# Patient Record
Sex: FEMALE | Race: WHITE | Employment: FULL TIME | URBAN - METROPOLITAN AREA
[De-identification: names, ages, dates, MRNs, and addresses within clinical notes are randomized per-mention and may not be internally consistent; named-entity substitution may affect disease eponyms.]

---

## 2018-03-29 PROBLEM — K21.9 GERD (GASTROESOPHAGEAL REFLUX DISEASE): Status: ACTIVE | Noted: 2018-03-29

## 2018-03-29 PROBLEM — E78.5 HYPERLIPIDEMIA: Status: ACTIVE | Noted: 2018-03-29

## 2018-03-29 PROBLEM — I10 ESSENTIAL HYPERTENSION: Status: ACTIVE | Noted: 2018-03-29

## 2018-03-29 PROBLEM — M25.552 LEFT HIP PAIN: Status: ACTIVE | Noted: 2018-03-29

## 2018-06-20 ENCOUNTER — HOSPITAL ENCOUNTER (OUTPATIENT)
Dept: MAMMOGRAPHY | Age: 58
Discharge: HOME | End: 2018-06-20
Attending: OBSTETRICS & GYNECOLOGY
Payer: COMMERCIAL

## 2018-06-20 DIAGNOSIS — M94.9 DISORDER OF BONE AND CARTILAGE: ICD-10-CM

## 2018-06-20 DIAGNOSIS — M89.9 DISORDER OF BONE AND CARTILAGE: ICD-10-CM

## 2018-06-20 DIAGNOSIS — Z12.31 ENCOUNTER FOR SCREENING MAMMOGRAM FOR BREAST CANCER: ICD-10-CM

## 2018-06-20 PROCEDURE — 77080 DXA BONE DENSITY AXIAL: CPT

## 2018-06-20 PROCEDURE — 77067 SCR MAMMO BI INCL CAD: CPT

## 2019-08-28 ENCOUNTER — HOSPITAL ENCOUNTER (OUTPATIENT)
Dept: MAMMOGRAPHY | Age: 59
Discharge: HOME | End: 2019-08-28
Attending: OBSTETRICS & GYNECOLOGY
Payer: COMMERCIAL

## 2019-08-28 DIAGNOSIS — N64.9 BREAST LESION: ICD-10-CM

## 2019-08-28 PROCEDURE — 76642 ULTRASOUND BREAST LIMITED: CPT

## 2019-08-28 PROCEDURE — 77066 DX MAMMO INCL CAD BI: CPT

## 2020-01-28 ENCOUNTER — HOSPITAL ENCOUNTER (OUTPATIENT)
Dept: LAB | Age: 60
Discharge: HOME | End: 2020-01-28

## 2020-01-28 PROCEDURE — 88312 SPECIAL STAINS GROUP 1: CPT

## 2020-01-28 PROCEDURE — 88305 TISSUE EXAM BY PATHOLOGIST: CPT

## 2020-10-06 ENCOUNTER — HOSPITAL ENCOUNTER (OUTPATIENT)
Dept: LAB | Age: 60
Discharge: HOME | End: 2020-10-06
Payer: COMMERCIAL

## 2020-10-06 ENCOUNTER — PATIENT OUTREACH (OUTPATIENT)
Dept: CASE MANAGEMENT | Age: 60
End: 2020-10-06

## 2020-10-06 DIAGNOSIS — I82.401 DEEP VEIN THROMBOSIS (DVT) OF RIGHT LOWER EXTREMITY, UNSPECIFIED CHRONICITY, UNSPECIFIED VEIN (HCC): ICD-10-CM

## 2020-10-06 DIAGNOSIS — C90.00 MULTIPLE MYELOMA NOT HAVING ACHIEVED REMISSION (HCC): ICD-10-CM

## 2020-10-06 LAB
25(OH)D3 SERPL-MCNC: 25 NG/ML (ref 30–100)
ALBUMIN SERPL-MCNC: 3.5 G/DL (ref 3.2–4.6)
ALBUMIN/GLOB SERPL: 1.1 {RATIO} (ref 1.2–3.5)
ALP SERPL-CCNC: 63 U/L (ref 50–136)
ALT SERPL-CCNC: 48 U/L (ref 12–65)
ANION GAP SERPL CALC-SCNC: 4 MMOL/L (ref 7–16)
AST SERPL-CCNC: 21 U/L (ref 15–37)
BASOPHILS # BLD: 0 K/UL (ref 0–0.2)
BASOPHILS NFR BLD: 0 % (ref 0–2)
BILIRUB SERPL-MCNC: 0.2 MG/DL (ref 0.2–1.1)
BUN SERPL-MCNC: 15 MG/DL (ref 8–23)
CALCIUM SERPL-MCNC: 8.8 MG/DL (ref 8.3–10.4)
CHLORIDE SERPL-SCNC: 108 MMOL/L (ref 98–107)
CO2 SERPL-SCNC: 29 MMOL/L (ref 21–32)
CREAT SERPL-MCNC: 0.9 MG/DL (ref 0.6–1)
DIFFERENTIAL METHOD BLD: ABNORMAL
EOSINOPHIL # BLD: 0.1 K/UL (ref 0–0.8)
EOSINOPHIL NFR BLD: 1 % (ref 0.5–7.8)
ERYTHROCYTE [DISTWIDTH] IN BLOOD BY AUTOMATED COUNT: 15.5 % (ref 11.9–14.6)
GLOBULIN SER CALC-MCNC: 3.2 G/DL (ref 2.3–3.5)
GLUCOSE SERPL-MCNC: 98 MG/DL (ref 65–100)
HCT VFR BLD AUTO: 39.4 % (ref 35.8–46.3)
HGB BLD-MCNC: 12.5 G/DL (ref 11.7–15.4)
IMM GRANULOCYTES # BLD AUTO: 0.2 K/UL (ref 0–0.5)
IMM GRANULOCYTES NFR BLD AUTO: 3 % (ref 0–5)
LYMPHOCYTES # BLD: 1.8 K/UL (ref 0.5–4.6)
LYMPHOCYTES NFR BLD: 22 % (ref 13–44)
MCH RBC QN AUTO: 30 PG (ref 26.1–32.9)
MCHC RBC AUTO-ENTMCNC: 31.7 G/DL (ref 31.4–35)
MCV RBC AUTO: 94.7 FL (ref 79.6–97.8)
MONOCYTES # BLD: 0.8 K/UL (ref 0.1–1.3)
MONOCYTES NFR BLD: 10 % (ref 4–12)
NEUTS SEG # BLD: 5.2 K/UL (ref 1.7–8.2)
NEUTS SEG NFR BLD: 64 % (ref 43–78)
NRBC # BLD: 0 K/UL (ref 0–0.2)
PLATELET # BLD AUTO: 217 K/UL (ref 150–450)
PMV BLD AUTO: 11.4 FL (ref 9.4–12.3)
POTASSIUM SERPL-SCNC: 3.7 MMOL/L (ref 3.5–5.1)
PROT SERPL-MCNC: 6.7 G/DL (ref 6.3–8.2)
RBC # BLD AUTO: 4.16 M/UL (ref 4.05–5.25)
SODIUM SERPL-SCNC: 141 MMOL/L (ref 136–145)
VIT B12 SERPL-MCNC: 453 PG/ML (ref 193–986)
WBC # BLD AUTO: 8.2 K/UL (ref 4.3–11.1)

## 2020-10-06 PROCEDURE — 86334 IMMUNOFIX E-PHORESIS SERUM: CPT

## 2020-10-06 PROCEDURE — 86147 CARDIOLIPIN ANTIBODY EA IG: CPT

## 2020-10-06 PROCEDURE — 85025 COMPLETE CBC W/AUTO DIFF WBC: CPT

## 2020-10-06 PROCEDURE — 82306 VITAMIN D 25 HYDROXY: CPT

## 2020-10-06 PROCEDURE — 82607 VITAMIN B-12: CPT

## 2020-10-06 PROCEDURE — 80053 COMPREHEN METABOLIC PANEL: CPT

## 2020-10-06 PROCEDURE — 36415 COLL VENOUS BLD VENIPUNCTURE: CPT

## 2020-10-06 PROCEDURE — 81241 F5 GENE: CPT

## 2020-10-06 PROCEDURE — 82232 ASSAY OF BETA-2 PROTEIN: CPT

## 2020-10-06 PROCEDURE — 85300 ANTITHROMBIN III ACTIVITY: CPT

## 2020-10-06 PROCEDURE — 81240 F2 GENE: CPT

## 2020-10-06 PROCEDURE — 81291 MTHFR GENE: CPT

## 2020-10-06 PROCEDURE — 84165 PROTEIN E-PHORESIS SERUM: CPT

## 2020-10-06 PROCEDURE — 83090 ASSAY OF HOMOCYSTEINE: CPT

## 2020-10-06 PROCEDURE — 85306 CLOT INHIBIT PROT S FREE: CPT

## 2020-10-06 PROCEDURE — 85303 CLOT INHIBIT PROT C ACTIVITY: CPT

## 2020-10-06 PROCEDURE — 83883 ASSAY NEPHELOMETRY NOT SPEC: CPT

## 2020-10-06 NOTE — PROGRESS NOTES
10/6/2020- Patient seen in the office today. Transplant process reviewed with patient. Patient will return to the office in November with  to discuss further. Scheduling pre-studies and planning for December transplant r/t insurance deductible has been met. Patient down to lab for blood work, we will review in November. Navigation provided educational booklet on transplants and contact information. Patient instructed to contact navigator with any questions or concerns. From: Tommy Michel [[email or web address]] <[email or web address]>  Sent: Tuesday, April 7, 2020 11:58 AM  To: Tanna Freedman [email or web address]>  Subject: [EXTERNAL] Re: Dr. Severo Perry     Ok got it. Thanks Tanna!!!!    Sent from my iPad

## 2020-10-07 LAB
AT III PPP CHRO-ACNC: 169 % (ref 75–135)
HCYS SERPL-SCNC: 10.3 UMOL/L (ref 0–17.2)
PROT S ACT/NOR PPP: 127 % (ref 63–140)

## 2020-10-08 LAB
B2 MICROGLOB SERPL-MCNC: 1.7 MG/L (ref 0.8–2.34)
CARDIOLIPIN IGA SER IA-ACNC: <9 APL U/ML (ref 0–11)
CARDIOLIPIN IGG SER IA-ACNC: <9 GPL U/ML (ref 0–14)
CARDIOLIPIN IGM SER IA-ACNC: <9 MPL U/ML (ref 0–12)
KAPPA LC FREE SER-MCNC: 7.05 MG/L (ref 3.3–19.4)
KAPPA LC FREE/LAMBDA FREE SER: 0.22 {RATIO} (ref 0.26–1.65)
LAMBDA LC FREE SERPL-MCNC: 31.52 MG/L (ref 5.71–26.3)
PROT C ACT/NOR PPP CHRO: 160 %

## 2020-10-12 LAB
ALBUMIN SERPL ELPH-MCNC: 3.76 G/DL (ref 3.2–5.6)
ALBUMIN/GLOB SERPL: 1.5 {RATIO} (ref 1.2–3.5)
ALPHA1 GLOB SERPL ELPH-MCNC: 0.26 G/DL (ref 0.1–0.4)
ALPHA2 GLOB SERPL ELPH-MCNC: 0.73 G/DL (ref 0.4–1.2)
B-GLOBULIN SERPL QL ELPH: 1.12 G/DL (ref 0.6–1.3)
F2 GENE MUT ANL BLD/T: NORMAL
F5 GENE MUT ANL BLD/T: NORMAL
GAMMA GLOB MFR SERPL ELPH: 0.44 G/DL (ref 0.5–1.6)
IGA SERPL-MCNC: 48 MG/DL (ref 85–499)
IGG SERPL-MCNC: 495 MG/DL (ref 610–1616)
IGM SERPL-MCNC: 23 MG/DL (ref 35–242)
M PROTEIN SERPL ELPH-MCNC: ABNORMAL G/DL
PROT PATTERN SERPL ELPH-IMP: ABNORMAL
PROT PATTERN SPEC IFE-IMP: ABNORMAL
PROT SERPL-MCNC: 6.3 G/DL (ref 6.3–8.2)

## 2020-10-14 LAB — MTHFR GENE MUT ANL BLD/T: NORMAL

## 2020-11-03 ENCOUNTER — PATIENT OUTREACH (OUTPATIENT)
Dept: CASE MANAGEMENT | Age: 60
End: 2020-11-03

## 2020-11-03 ENCOUNTER — HOSPITAL ENCOUNTER (OUTPATIENT)
Dept: LAB | Age: 60
Discharge: HOME | End: 2020-11-03
Payer: COMMERCIAL

## 2020-11-03 DIAGNOSIS — C90.00 MULTIPLE MYELOMA NOT HAVING ACHIEVED REMISSION (HCC): ICD-10-CM

## 2020-11-03 LAB
ALBUMIN SERPL-MCNC: 3.4 G/DL (ref 3.2–4.6)
ALBUMIN/GLOB SERPL: 1.1 {RATIO} (ref 1.2–3.5)
ALP SERPL-CCNC: 65 U/L (ref 50–136)
ALT SERPL-CCNC: 48 U/L (ref 12–65)
ANION GAP SERPL CALC-SCNC: 3 MMOL/L (ref 7–16)
AST SERPL-CCNC: 14 U/L (ref 15–37)
BASOPHILS # BLD: 0 K/UL (ref 0–0.2)
BASOPHILS NFR BLD: 1 % (ref 0–2)
BILIRUB SERPL-MCNC: 0.4 MG/DL (ref 0.2–1.1)
BUN SERPL-MCNC: 13 MG/DL (ref 8–23)
CALCIUM SERPL-MCNC: 8.6 MG/DL (ref 8.3–10.4)
CHLORIDE SERPL-SCNC: 109 MMOL/L (ref 98–107)
CO2 SERPL-SCNC: 29 MMOL/L (ref 21–32)
CREAT SERPL-MCNC: 0.8 MG/DL (ref 0.6–1)
DIFFERENTIAL METHOD BLD: ABNORMAL
EOSINOPHIL # BLD: 0.1 K/UL (ref 0–0.8)
EOSINOPHIL NFR BLD: 2 % (ref 0.5–7.8)
ERYTHROCYTE [DISTWIDTH] IN BLOOD BY AUTOMATED COUNT: 15.8 % (ref 11.9–14.6)
GLOBULIN SER CALC-MCNC: 3.1 G/DL (ref 2.3–3.5)
GLUCOSE SERPL-MCNC: 86 MG/DL (ref 65–100)
HCT VFR BLD AUTO: 40.1 % (ref 35.8–46.3)
HGB BLD-MCNC: 12.7 G/DL (ref 11.7–15.4)
IMM GRANULOCYTES # BLD AUTO: 0 K/UL (ref 0–0.5)
IMM GRANULOCYTES NFR BLD AUTO: 1 % (ref 0–5)
LYMPHOCYTES # BLD: 1.4 K/UL (ref 0.5–4.6)
LYMPHOCYTES NFR BLD: 27 % (ref 13–44)
MAGNESIUM SERPL-MCNC: 2.2 MG/DL (ref 1.8–2.4)
MCH RBC QN AUTO: 30 PG (ref 26.1–32.9)
MCHC RBC AUTO-ENTMCNC: 31.7 G/DL (ref 31.4–35)
MCV RBC AUTO: 94.6 FL (ref 79.6–97.8)
MONOCYTES # BLD: 0.7 K/UL (ref 0.1–1.3)
MONOCYTES NFR BLD: 14 % (ref 4–12)
NEUTS SEG # BLD: 3 K/UL (ref 1.7–8.2)
NEUTS SEG NFR BLD: 56 % (ref 43–78)
NRBC # BLD: 0 K/UL (ref 0–0.2)
PLATELET # BLD AUTO: 247 K/UL (ref 150–450)
PMV BLD AUTO: 11.1 FL (ref 9.4–12.3)
POTASSIUM SERPL-SCNC: 4.2 MMOL/L (ref 3.5–5.1)
PROT SERPL-MCNC: 6.5 G/DL (ref 6.3–8.2)
RBC # BLD AUTO: 4.24 M/UL (ref 4.05–5.25)
SODIUM SERPL-SCNC: 141 MMOL/L (ref 136–145)
WBC # BLD AUTO: 5.3 K/UL (ref 4.3–11.1)

## 2020-11-03 PROCEDURE — 36415 COLL VENOUS BLD VENIPUNCTURE: CPT

## 2020-11-03 PROCEDURE — 85025 COMPLETE CBC W/AUTO DIFF WBC: CPT

## 2020-11-03 PROCEDURE — 80053 COMPREHEN METABOLIC PANEL: CPT

## 2020-11-03 PROCEDURE — 86334 IMMUNOFIX E-PHORESIS SERUM: CPT

## 2020-11-03 PROCEDURE — 83735 ASSAY OF MAGNESIUM: CPT

## 2020-11-03 PROCEDURE — 84165 PROTEIN E-PHORESIS SERUM: CPT

## 2020-11-03 PROCEDURE — 83883 ASSAY NEPHELOMETRY NOT SPEC: CPT

## 2020-11-03 NOTE — PROGRESS NOTES
11/13/2020- Patient seen in the office today. She is feeling well. Will finish treatment in November of R/V/D 4 cycles at Dr. Tamela Lopez, plans to transplant in December. Procedure reviewed with patient and . All questions answered at this time. Pt will call with any other questions. We will be monitoring results of prestudies. No other needs at this time.

## 2020-11-04 LAB
KAPPA LC FREE SER-MCNC: 6.7 MG/L (ref 3.3–19.4)
KAPPA LC FREE/LAMBDA FREE SER: 0.14 {RATIO} (ref 0.26–1.65)
LAMBDA LC FREE SERPL-MCNC: 49.18 MG/L (ref 5.71–26.3)

## 2020-11-05 LAB
ALBUMIN SERPL ELPH-MCNC: 3.55 G/DL (ref 3.2–5.6)
ALBUMIN/GLOB SERPL: 1.3 {RATIO}
ALPHA1 GLOB SERPL ELPH-MCNC: 0.21 G/DL (ref 0.1–0.4)
ALPHA2 GLOB SERPL ELPH-MCNC: 0.77 G/DL (ref 0.4–1.2)
B-GLOBULIN SERPL QL ELPH: 1.14 G/DL (ref 0.6–1.3)
GAMMA GLOB MFR SERPL ELPH: 0.54 G/DL (ref 0.5–1.6)
IGA SERPL-MCNC: 30 MG/DL (ref 85–499)
IGG SERPL-MCNC: 501 MG/DL (ref 610–1616)
IGM SERPL-MCNC: 16 MG/DL (ref 35–242)
M PROTEIN SERPL ELPH-MCNC: ABNORMAL G/DL
PROT PATTERN SERPL ELPH-IMP: ABNORMAL
PROT PATTERN SPEC IFE-IMP: ABNORMAL
PROT SERPL-MCNC: 6.2 G/DL (ref 6.3–8.2)

## 2020-11-12 NOTE — PROGRESS NOTES
Left 2 messages regarding a Xarelto hold with ordering provider Dr. Jere Campuzano in [place]. Awaiting clearance to hold Xarelto for chest port placement on Tuesday 11/17/20. Patient is aware.

## 2020-11-17 ENCOUNTER — HOSPITAL ENCOUNTER (OUTPATIENT)
Dept: INTERVENTIONAL RADIOLOGY/VASCULAR | Age: 60
Discharge: HOME | End: 2020-11-17
Attending: INTERNAL MEDICINE
Payer: COMMERCIAL

## 2020-11-17 VITALS
BODY MASS INDEX: 25.52 KG/M2 | WEIGHT: 130 LBS | DIASTOLIC BLOOD PRESSURE: 60 MMHG | HEIGHT: 60 IN | OXYGEN SATURATION: 96 % | HEART RATE: 80 BPM | SYSTOLIC BLOOD PRESSURE: 126 MMHG | TEMPERATURE: 97.7 F | RESPIRATION RATE: 16 BRPM

## 2020-11-17 DIAGNOSIS — C90.00 MULTIPLE MYELOMA NOT HAVING ACHIEVED REMISSION (HCC): ICD-10-CM

## 2020-11-17 PROCEDURE — C1788 PORT, INDWELLING, IMP: HCPCS

## 2020-11-17 PROCEDURE — 74011000250 HC RX REV CODE- 250: Performed by: RADIOLOGY

## 2020-11-17 PROCEDURE — 74011250636 HC RX REV CODE- 250/636: Performed by: RADIOLOGY

## 2020-11-17 PROCEDURE — C1894 INTRO/SHEATH, NON-LASER: HCPCS

## 2020-11-17 PROCEDURE — 77030003028 HC SUT VCRL J&J -A

## 2020-11-17 PROCEDURE — 77030010507 HC ADH SKN DERMBND J&J -B

## 2020-11-17 PROCEDURE — 36561 INSERT TUNNELED CV CATH: CPT

## 2020-11-17 RX ORDER — MIDAZOLAM HYDROCHLORIDE 1 MG/ML
.5-2 INJECTION, SOLUTION INTRAMUSCULAR; INTRAVENOUS
Status: DISCONTINUED | OUTPATIENT
Start: 2020-11-17 | End: 2020-11-21 | Stop reason: HOSPADM

## 2020-11-17 RX ORDER — DIPHENHYDRAMINE HYDROCHLORIDE 50 MG/ML
50 INJECTION, SOLUTION INTRAMUSCULAR; INTRAVENOUS
Status: DISCONTINUED | OUTPATIENT
Start: 2020-11-17 | End: 2020-11-21 | Stop reason: HOSPADM

## 2020-11-17 RX ORDER — LIDOCAINE HYDROCHLORIDE 20 MG/ML
2-10 INJECTION, SOLUTION INFILTRATION; PERINEURAL ONCE
Status: COMPLETED | OUTPATIENT
Start: 2020-11-17 | End: 2020-11-17

## 2020-11-17 RX ORDER — HEPARIN SODIUM (PORCINE) LOCK FLUSH IV SOLN 100 UNIT/ML 100 UNIT/ML
500 SOLUTION INTRAVENOUS AS NEEDED
Status: DISCONTINUED | OUTPATIENT
Start: 2020-11-17 | End: 2020-11-21 | Stop reason: HOSPADM

## 2020-11-17 RX ORDER — CLINDAMYCIN PHOSPHATE 900 MG/50ML
900 INJECTION INTRAVENOUS ONCE
Status: COMPLETED | OUTPATIENT
Start: 2020-11-17 | End: 2020-11-17

## 2020-11-17 RX ORDER — CEFAZOLIN SODIUM/WATER 2 G/20 ML
2 SYRINGE (ML) INTRAVENOUS ONCE
Status: DISCONTINUED | OUTPATIENT
Start: 2020-11-17 | End: 2020-11-17

## 2020-11-17 RX ORDER — FENTANYL CITRATE 50 UG/ML
25-50 INJECTION, SOLUTION INTRAMUSCULAR; INTRAVENOUS
Status: DISCONTINUED | OUTPATIENT
Start: 2020-11-17 | End: 2020-11-21 | Stop reason: HOSPADM

## 2020-11-17 RX ORDER — LIDOCAINE HYDROCHLORIDE AND EPINEPHRINE 10; 10 MG/ML; UG/ML
2-100 INJECTION, SOLUTION INFILTRATION; PERINEURAL ONCE
Status: COMPLETED | OUTPATIENT
Start: 2020-11-17 | End: 2020-11-17

## 2020-11-17 RX ADMIN — MIDAZOLAM 0.5 MG: 1 INJECTION INTRAMUSCULAR; INTRAVENOUS at 08:53

## 2020-11-17 RX ADMIN — LIDOCAINE HYDROCHLORIDE,EPINEPHRINE BITARTRATE 200 MG: 10; .01 INJECTION, SOLUTION INFILTRATION; PERINEURAL at 09:00

## 2020-11-17 RX ADMIN — MIDAZOLAM 1 MG: 1 INJECTION INTRAMUSCULAR; INTRAVENOUS at 08:34

## 2020-11-17 RX ADMIN — MIDAZOLAM 0.5 MG: 1 INJECTION INTRAMUSCULAR; INTRAVENOUS at 08:48

## 2020-11-17 RX ADMIN — FENTANYL CITRATE 50 MCG: 50 INJECTION, SOLUTION INTRAMUSCULAR; INTRAVENOUS at 08:34

## 2020-11-17 RX ADMIN — FENTANYL CITRATE 50 MCG: 50 INJECTION, SOLUTION INTRAMUSCULAR; INTRAVENOUS at 08:38

## 2020-11-17 RX ADMIN — DIPHENHYDRAMINE HYDROCHLORIDE 50 MG: 50 INJECTION, SOLUTION INTRAMUSCULAR; INTRAVENOUS at 08:32

## 2020-11-17 RX ADMIN — LIDOCAINE HYDROCHLORIDE 200 MG: 20 INJECTION, SOLUTION INFILTRATION; PERINEURAL at 08:40

## 2020-11-17 RX ADMIN — HEPARIN SODIUM (PORCINE) LOCK FLUSH IV SOLN 100 UNIT/ML 500 UNITS: 100 SOLUTION at 08:49

## 2020-11-17 RX ADMIN — MIDAZOLAM 1 MG: 1 INJECTION INTRAMUSCULAR; INTRAVENOUS at 08:38

## 2020-11-17 RX ADMIN — FENTANYL CITRATE 25 MCG: 50 INJECTION, SOLUTION INTRAMUSCULAR; INTRAVENOUS at 08:53

## 2020-11-17 RX ADMIN — FENTANYL CITRATE 25 MCG: 50 INJECTION, SOLUTION INTRAMUSCULAR; INTRAVENOUS at 08:48

## 2020-11-17 RX ADMIN — CLINDAMYCIN PHOSPHATE 900 MG: 900 INJECTION, SOLUTION INTRAVENOUS at 08:32

## 2020-11-17 NOTE — PROGRESS NOTES
Recovery period without difficulty. Pt alert and oriented and denies pain. Dressing is clean, dry, and intact. Reviewed discharge instructions with patient and spouse, both verbalized understanding. Pt escorted to lobby discharge area via wheelchair. Vital signs and Birdie score completed.

## 2020-11-17 NOTE — DISCHARGE INSTRUCTIONS
[address]  Department of Interventional Radiology  [facility]  [phone] Office  [phone] Fax  Implanted Port Discharge Instructions      General Instructions:   A port is like an implanted IV. They are usually ordered for patients who will be getting chemotherapy, but can also be used as an IV for long term antibiotics, large amounts of fluids, and/or blood products. Your blood can be drawn from your port for labs also. Those patients who do not have good veins find the ports convenient as they can get the IV they need with one stick. The port can be used long term, and the care is easy. The device is under the skin, and once the skin heals, care is minimal. All that is required is the nurse who accesses the port will need to flush it with heparinized saline after each use. Ports are usually placed in the chest wall, usually on the right side. But they can be place in the arms and in the abdomen. Home Care Instructions: If your port is in your arm, do not allow blood pressure or other IVs to be place in that arm. Do not allow bra straps or any clothing to rub the skin over the port. Do not bathe or swim until the skin has healed and if the port is accessed. Once it is healed, and when the port is not accessed, it is okay to bathe and swim. Restrict yourself to light activity for the first 5 days after getting the port put in, after that, resume normal activity slowly. You may resume your normal diet and medications. Follow-Up Instructions: Please see your oncologist, or whatever physician ordered the port as he/she has requested of you. Call If: You should call your Physician and/or the Radiology Nurse if you notice redness, pus, swelling, or pain from the area of your incision. Call if you should develop a fever. The nurses who access your port will know to call your doctor if the port does not seem to be working properly. You need to tell the nurses who use the port if you should have any pain or swelling at the site during an infusion. To Reach Us: If you have any questions about your procedure, please call the Interventional Radiology department at [phone]. After business hours (5pm) and weekends, call the answering service at [phone] and ask for the Radiologist on call to be paged. Si tiene Preguntas acerca del procedimiento, por favor llame al departamento de Radiología Intervencional al [phone]. Después de horas de oficina (5 pm) y los fines de [place], llamar al Sandra Herrera al [phone] y pregunte por el Radiologo de [facility]. Interventional Radiology General Nurse Discharge    After general anesthesia or intravenous sedation, for 24 hours or while taking prescription Narcotics:  · Limit your activities  · Do not drive and operate hazardous machinery  · Do not make important personal or business decisions  · Do  not drink alcoholic beverages  · If you have not urinated within 8 hours after discharge, please contact your surgeon on call. * Please give a list of your current medications to your Primary Care Provider. * Please update this list whenever your medications are discontinued, doses are     changed, or new medications (including over-the-counter products) are added. * Please carry medication information at all times in case of emergency situations. These are general instructions for a healthy lifestyle:    No smoking/ No tobacco products/ Avoid exposure to second hand smoke  Surgeon General's Warning:  Quitting smoking now greatly reduces serious risk to your health.     Obesity, smoking, and sedentary lifestyle greatly increases your risk for illness  A healthy diet, regular physical exercise & weight monitoring are important for maintaining a healthy lifestyle    You may be retaining fluid if you have a history of heart failure or if you experience any of the following symptoms:  Weight gain of 3 pounds or more overnight or 5 pounds in a week, increased swelling in our hands or feet or shortness of breath while lying flat in bed. Please call your doctor as soon as you notice any of these symptoms; do not wait until your next office visit. Recognize signs and symptoms of STROKE:  F-face looks uneven    A-arms unable to move or move unevenly    S-speech slurred or non-existent    T-time-call 911 as soon as signs and symptoms begin-DO NOT go       Back to bed or wait to see if you get better-TIME IS BRAIN.       Patient Signature:  Date: 11/17/2020  Discharging Nurse: Silva Lanza RN

## 2020-11-17 NOTE — PROCEDURES
Department of Interventional Radiology  [phone]        Interventional Radiology Brief Procedure Note    Patient: Patrick Leon MRN: [id number]  SSN: [id number]    YOB: 1960  Age: 56 y.o.   Sex: female      Date of Procedure: 11/17/2020    Pre-Procedure Diagnosis: MM    Post-Procedure Diagnosis: SAME    Procedure(s): Venous Chest Port Placement    Brief Description of Procedure: as above    Performed By: Jose Quiñones MD     Assistants: None    Anesthesia:Moderate Sedation    Estimated Blood Loss: Less than 10ml    Specimens:  None    Implants:  Chest Port Placement    Findings: patent right IJ    Complications: None    Recommendations: ok to use     Follow Up: as needed    Signed By: Jose Quiñones MD     November 17, 2020

## 2020-11-17 NOTE — H&P
Department of Interventional Radiology  [phone]    History and Physical    Patient:  Letitia Olsen MRN:  [id number]  SSN:  [id number]    YOB: 1960  Age:  56 y.o. Sex:  female      Primary Care Provider:  Daria Leiva MD  Referring Physician:  Bishop Moscoso MD    Subjective:     Chief Complaint: port    History of the Present Illness: The patient is a 56 y.o. female with multiple myeloma who presents for port placement. NPO. Past Medical History:   Diagnosis Date    Essential hypertension 3/29/2018    GERD (gastroesophageal reflux disease) 3/29/2018    Hyperlipemia     Hyperlipidemia 3/29/2018    Hypertension     Left hip pain 3/29/2018     Past Surgical History:   Procedure Laterality Date    HX OTHER SURGICAL      open chest surgey    HX TUBAL LIGATION          Review of Systems:    Pertinent items are noted in the History of Present Illness. Prior to Admission medications    Medication Sig Start Date End Date Taking? Authorizing Provider   ergocalciferol (ERGOCALCIFEROL) 1,250 mcg (50,000 unit) capsule Take 1 Cap by mouth every seven (7) days. 10/14/20  Yes Bishop Moscoso MD   acyclovir (ZOVIRAX) 800 mg tablet Take 800 mg by mouth daily. 7/30/20  Yes Provider, Historical   lenalidomide 15 mg cap Take 1 daily for 14days for 14 day cycle 9/22/20  Yes Provider, Historical   simvastatin (ZOCOR) 20 mg tablet Take 1 Tab by mouth nightly. Indications: high cholesterol and high triglycerides 9/30/20  Yes Vincent Fishman NP   lisinopriL (PRINIVIL, ZESTRIL) 10 mg tablet Take 1 Tab by mouth daily. Indications: high blood pressure 9/30/20  Yes Lisa Aponte Asa F, NP   diclofenac (VOLTAREN XR) 100 mg ER tablet TAKE 1 TABLET BY MOUTH ONCE DAILY 7/11/19  Yes Provider, Historical   esomeprazole (NEXIUM) 20 mg capsule Take 1 Cap by mouth daily.  7/19/19  Yes Daria Leiva MD   dexAMETHasone (DECADRON) 4 mg tablet Take 5 tablets on days of tx 7/21/20   Provider, Historical gabapentin (NEURONTIN) 300 mg capsule TAKE 1 CAPSULE BY MOUTH THREE TIMES DAILY 8/18/20   Provider, Historical   rivaroxaban (XARELTO) 20 mg tab tablet Take 20 mg by mouth daily. 8/24/20   Provider, Historical   traMADoL (ULTRAM) 50 mg tablet TAKE 1 TABLET BY MOUTH TWICE DAILY AS NEEDED FOR PAIN 9/26/20   Provider, Historical   conjugated estrogens (PREMARIN) 0.625 mg/gram vaginal cream Insert 0.5 g into vagina five (5) days a week. MAY NEED TO USE 2MONTHS BEFORE GOOD RESULTS 8/22/19   Nelsy Sanchez MD   azithromycin [facility]) 250 mg tablet Take two tablets today then one tablet daily 8/8/19 June MD Luz Elena   fluticasone propionate (FLONASE) 50 mcg/actuation nasal spray 2 Sprays by Both Nostrils route daily.  8/8/19 June MD Luz Elena        Allergies   Allergen Reactions    Erythromycin Nausea Only    Penicillins Rash       Family History   Problem Relation Age of Onset    Breast Cancer Neg Hx     Colon Cancer Neg Hx     Ovarian Cancer Neg Hx      Social History     Tobacco Use    Smoking status: Never Smoker    Smokeless tobacco: Never Used   Substance Use Topics    Alcohol use: No        Objective:       Physical Examination:    Vitals:    11/17/20 0800   BP: (!) 162/77   Pulse: 68   Resp: 18   Temp: 97.7 °F (36.5 °C)   SpO2: 99%       Pain Assessment  Pain Intensity 1: 0 (11/17/20 0800)               HEART: regular rate and rhythm  LUNG: clear to auscultation bilaterally  ABDOMEN: normal findings: soft, non-tender  EXTREMITIES: normal strength, tone, and muscle mass    Laboratory:     Lab Results   Component Value Date/Time    Sodium 141 11/03/2020 12:34 PM    Sodium 141 10/06/2020 04:08 PM    Potassium 4.2 11/03/2020 12:34 PM    Potassium 3.7 10/06/2020 04:08 PM    Chloride 109 (H) 11/03/2020 12:34 PM    Chloride 108 (H) 10/06/2020 04:08 PM    CO2 29 11/03/2020 12:34 PM    CO2 29 10/06/2020 04:08 PM    Anion gap 3 (L) 11/03/2020 12:34 PM    Anion gap 4 (L) 10/06/2020 04:08 PM Glucose 86 11/03/2020 12:34 PM    Glucose 98 10/06/2020 04:08 PM    BUN 13 11/03/2020 12:34 PM    BUN 15 10/06/2020 04:08 PM    Creatinine 0.80 11/03/2020 12:34 PM    Creatinine 0.90 10/06/2020 04:08 PM    GFR est AA >60 11/03/2020 12:34 PM    GFR est AA >60 10/06/2020 04:08 PM    GFR est non-AA >60 11/03/2020 12:34 PM    GFR est non-AA >60 10/06/2020 04:08 PM    Calcium 8.6 11/03/2020 12:34 PM    Calcium 8.8 10/06/2020 04:08 PM    Magnesium 2.2 11/03/2020 12:34 PM    Magnesium 1.9 03/28/2017 08:33 AM    Albumin 3.4 11/03/2020 12:34 PM    Albumin 3.5 10/06/2020 04:08 PM    Protein, total 6.5 11/03/2020 12:34 PM    Protein, total 6.2 (L) 11/03/2020 12:34 PM    Globulin 3.1 11/03/2020 12:34 PM    Globulin 3.2 10/06/2020 04:08 PM    A-G Ratio 1.1 (L) 11/03/2020 12:34 PM    A-G Ratio 1.3 11/03/2020 12:34 PM    ALT (SGPT) 48 11/03/2020 12:34 PM    ALT (SGPT) 48 10/06/2020 04:08 PM     Lab Results   Component Value Date/Time    WBC 5.3 11/03/2020 12:34 PM    WBC 8.2 10/06/2020 04:08 PM    HGB 12.7 11/03/2020 12:34 PM    HGB 12.5 10/06/2020 04:08 PM    HCT 40.1 11/03/2020 12:34 PM    HCT 39.4 10/06/2020 04:08 PM    PLATELET 895 91/72/1472 12:34 PM    PLATELET 673 50/07/9059 04:08 PM     No results found for: APTT, PTP, INR, INREXT    Assessment:     Multiple myeloma    Hospital Problems  Date Reviewed: 8/8/2019    None          Plan:     Planned Procedure:  Port placement    Risks, benefits, and alternatives reviewed with patient and she agrees to proceed with the procedure.       Signed By: Shannon Louise PA-C     November 17, 2020

## 2020-11-17 NOTE — PROGRESS NOTES
IR Nurse Pre-Procedure Checklist Part 2          Consent signed: Yes    H&P complete:  Yes    Antibiotics: Yes    Airway/Mallampati Done: Yes    Shaved:  No    Pregnancy Form:No    Patient Position: Yes    MD Side: Yes     Biopsy Worksheet: No    Specimen Medium: No

## 2020-11-17 NOTE — PROGRESS NOTES
The patient was counseled at length about the risks of carlin Covid-19 during their perioperative period and any recovery window from their procedure. The patient was made aware that carlin Covid-19  may worsen their prognosis for recovering from their procedure and lend to a higher morbidity and/or mortality risk. All material risks, benefits, and reasonable alternatives including postponing the procedure were discussed. The patient does  wish to proceed with the procedure at this time.

## 2020-11-20 ENCOUNTER — PATIENT OUTREACH (OUTPATIENT)
Dept: CASE MANAGEMENT | Age: 60
End: 2020-11-20

## 2020-11-20 ENCOUNTER — HOSPITAL ENCOUNTER (OUTPATIENT)
Dept: LAB | Age: 60
Discharge: HOME | End: 2020-11-20
Payer: COMMERCIAL

## 2020-11-20 ENCOUNTER — HOSPITAL ENCOUNTER (OUTPATIENT)
Dept: INFUSION THERAPY | Age: 60
Discharge: HOME | End: 2020-11-20
Payer: COMMERCIAL

## 2020-11-20 ENCOUNTER — HOSPITAL ENCOUNTER (OUTPATIENT)
Dept: CT IMAGING | Age: 60
Discharge: HOME | End: 2020-11-20
Attending: INTERNAL MEDICINE
Payer: COMMERCIAL

## 2020-11-20 VITALS
HEIGHT: 60 IN | WEIGHT: 130 LBS | DIASTOLIC BLOOD PRESSURE: 67 MMHG | HEART RATE: 68 BPM | SYSTOLIC BLOOD PRESSURE: 118 MMHG | OXYGEN SATURATION: 92 % | BODY MASS INDEX: 25.52 KG/M2 | TEMPERATURE: 98.6 F | RESPIRATION RATE: 16 BRPM

## 2020-11-20 VITALS
HEART RATE: 78 BPM | DIASTOLIC BLOOD PRESSURE: 67 MMHG | RESPIRATION RATE: 18 BRPM | SYSTOLIC BLOOD PRESSURE: 124 MMHG | OXYGEN SATURATION: 98 % | TEMPERATURE: 97.9 F | BODY MASS INDEX: 25.74 KG/M2 | WEIGHT: 131.8 LBS

## 2020-11-20 DIAGNOSIS — C90.00 MULTIPLE MYELOMA NOT HAVING ACHIEVED REMISSION (HCC): ICD-10-CM

## 2020-11-20 LAB
BASOPHILS # BLD: 0 K/UL (ref 0–0.2)
BASOPHILS NFR BLD: 0 % (ref 0–2)
BONE MARROW PREP & W,BMA: NORMAL
DIFFERENTIAL METHOD BLD: ABNORMAL
EOSINOPHIL # BLD: 0.1 K/UL (ref 0–0.8)
EOSINOPHIL NFR BLD: 2 % (ref 0.5–7.8)
ERYTHROCYTE [DISTWIDTH] IN BLOOD BY AUTOMATED COUNT: 15.4 % (ref 11.9–14.6)
HBV SURFACE AB SERPL IA-ACNC: <3.1 MIU/ML
HCT VFR BLD AUTO: 37.5 % (ref 35.8–46.3)
HGB BLD-MCNC: 12 G/DL (ref 11.7–15.4)
IMM GRANULOCYTES # BLD AUTO: 0 K/UL (ref 0–0.5)
IMM GRANULOCYTES NFR BLD AUTO: 0 % (ref 0–5)
LYMPHOCYTES # BLD: 1 K/UL (ref 0.5–4.6)
LYMPHOCYTES NFR BLD: 24 % (ref 13–44)
MCH RBC QN AUTO: 30.5 PG (ref 26.1–32.9)
MCHC RBC AUTO-ENTMCNC: 32 G/DL (ref 31.4–35)
MCV RBC AUTO: 95.4 FL (ref 79.6–97.8)
MONOCYTES # BLD: 0.4 K/UL (ref 0.1–1.3)
MONOCYTES NFR BLD: 10 % (ref 4–12)
NEUTS SEG # BLD: 2.7 K/UL (ref 1.7–8.2)
NEUTS SEG NFR BLD: 63 % (ref 43–78)
NRBC # BLD: 0 K/UL (ref 0–0.2)
PLATELET # BLD AUTO: 185 K/UL (ref 150–450)
PMV BLD AUTO: 10.3 FL (ref 9.4–12.3)
RBC # BLD AUTO: 3.93 M/UL (ref 4.05–5.2)
WBC # BLD AUTO: 4.2 K/UL (ref 4.3–11.1)

## 2020-11-20 PROCEDURE — 99152 MOD SED SAME PHYS/QHP 5/>YRS: CPT

## 2020-11-20 PROCEDURE — 74011250636 HC RX REV CODE- 250/636: Performed by: RADIOLOGY

## 2020-11-20 PROCEDURE — 86694 HERPES SIMPLEX NES ANTBDY: CPT

## 2020-11-20 PROCEDURE — 88184 FLOWCYTOMETRY/ TC 1 MARKER: CPT

## 2020-11-20 PROCEDURE — 86664 EPSTEIN-BARR NUCLEAR ANTIGEN: CPT

## 2020-11-20 PROCEDURE — 85025 COMPLETE CBC W/AUTO DIFF WBC: CPT

## 2020-11-20 PROCEDURE — 88311 DECALCIFY TISSUE: CPT

## 2020-11-20 PROCEDURE — 36415 COLL VENOUS BLD VENIPUNCTURE: CPT

## 2020-11-20 PROCEDURE — 77012 CT SCAN FOR NEEDLE BIOPSY: CPT

## 2020-11-20 PROCEDURE — 99212 OFFICE O/P EST SF 10 MIN: CPT

## 2020-11-20 PROCEDURE — 86787 VARICELLA-ZOSTER ANTIBODY: CPT

## 2020-11-20 PROCEDURE — 88305 TISSUE EXAM BY PATHOLOGIST: CPT

## 2020-11-20 PROCEDURE — 86790 VIRUS ANTIBODY NOS: CPT

## 2020-11-20 PROCEDURE — 88313 SPECIAL STAINS GROUP 2: CPT

## 2020-11-20 PROCEDURE — 86706 HEP B SURFACE ANTIBODY: CPT

## 2020-11-20 PROCEDURE — 74011000250 HC RX REV CODE- 250: Performed by: RADIOLOGY

## 2020-11-20 PROCEDURE — 86592 SYPHILIS TEST NON-TREP QUAL: CPT

## 2020-11-20 PROCEDURE — 88185 FLOWCYTOMETRY/TC ADD-ON: CPT

## 2020-11-20 RX ORDER — FENTANYL CITRATE 50 UG/ML
25-100 INJECTION, SOLUTION INTRAMUSCULAR; INTRAVENOUS
Status: DISCONTINUED | OUTPATIENT
Start: 2020-11-20 | End: 2020-11-20 | Stop reason: ALTCHOICE

## 2020-11-20 RX ORDER — SODIUM CHLORIDE 9 MG/ML
25 INJECTION, SOLUTION INTRAVENOUS ONCE
Status: COMPLETED | OUTPATIENT
Start: 2020-11-20 | End: 2020-11-20

## 2020-11-20 RX ORDER — MIDAZOLAM HYDROCHLORIDE 1 MG/ML
.25-2 INJECTION, SOLUTION INTRAMUSCULAR; INTRAVENOUS
Status: DISCONTINUED | OUTPATIENT
Start: 2020-11-20 | End: 2020-11-20 | Stop reason: ALTCHOICE

## 2020-11-20 RX ORDER — LIDOCAINE HYDROCHLORIDE 20 MG/ML
20-200 INJECTION, SOLUTION INFILTRATION; PERINEURAL
Status: DISCONTINUED | OUTPATIENT
Start: 2020-11-20 | End: 2020-11-20 | Stop reason: ALTCHOICE

## 2020-11-20 RX ADMIN — FENTANYL CITRATE 25 MCG: 50 INJECTION, SOLUTION INTRAMUSCULAR; INTRAVENOUS at 10:58

## 2020-11-20 RX ADMIN — LIDOCAINE HYDROCHLORIDE 200 MG: 20 INJECTION, SOLUTION INFILTRATION; PERINEURAL at 10:57

## 2020-11-20 RX ADMIN — FENTANYL CITRATE 25 MCG: 50 INJECTION, SOLUTION INTRAMUSCULAR; INTRAVENOUS at 10:53

## 2020-11-20 RX ADMIN — MIDAZOLAM 1 MG: 1 INJECTION INTRAMUSCULAR; INTRAVENOUS at 10:45

## 2020-11-20 RX ADMIN — SODIUM CHLORIDE 25 ML/HR: 900 INJECTION, SOLUTION INTRAVENOUS at 10:45

## 2020-11-20 RX ADMIN — FENTANYL CITRATE 50 MCG: 50 INJECTION, SOLUTION INTRAMUSCULAR; INTRAVENOUS at 10:45

## 2020-11-20 RX ADMIN — MIDAZOLAM 0.5 MG: 1 INJECTION INTRAMUSCULAR; INTRAVENOUS at 10:53

## 2020-11-20 RX ADMIN — MIDAZOLAM 0.5 MG: 1 INJECTION INTRAMUSCULAR; INTRAVENOUS at 10:58

## 2020-11-20 NOTE — PROGRESS NOTES
Pt arrived ambulating accompanied by . Apheresis consult completed. 20 minutes spent on education. Verbal and written information was given on process of stem cell collection, including potential side effects from process as well as medicines such as Mozobil and Granix. Instructed pt to take po Claritin for pain prevention. Handouts given on Claritin for pain, [facility], and hand hygiene. Donor questionnaire was completed Any questions answered yes beyond reading material:no (no/yes). Physician was notified of any yes responses n/a. All questions were answered, pt verbalized understanding.

## 2020-11-20 NOTE — PROCEDURES
Department of Interventional Radiology  [phone]        Interventional Radiology Brief Procedure Note    Patient: Humera Renner MRN: [id number]  SSN: [id number]    YOB: 1960  Age: 56 y.o.   Sex: female      Date of Procedure: 11/20/2020    Pre-Procedure Diagnosis: MM    Post-Procedure Diagnosis: SAME    Procedure(s): Image Guided Biopsy    Brief Description of Procedure: CT guided bone marrow bx    Performed By: Shady Christian MD     Assistants: None    Anesthesia:Moderate Sedation    Estimated Blood Loss: Less than 10ml    Specimens:  Sent to lab    Implants:  None    Findings: Right iliac bone amenable to bx    Complications: None    Signed By: Shady Christian MD     November 20, 2020

## 2020-11-20 NOTE — PROGRESS NOTES
TRANSFER - OUT REPORT:    Verbal report given to Keerthi Be RN (name) on Baltazar Coup  being transferred to IR recovery (unit) for routine progression of care       Report consisted of patients Situation, Background, Assessment and   Recommendations(SBAR). Information from the following report(s) Procedure Summary and MAR was reviewed with the receiving nurse. Opportunity for questions and clarification was provided. Conscious Sedation:   100 Mcg of Fentanyl administered  2 Mg of Versed administered    Pt tolerated procedure well.      Visit Vitals  /64 (BP 1 Location: Right arm, BP Patient Position: At rest)   Pulse 72   Temp 98.6 °F (37 °C)   Resp 14   Ht 5' (1.524 m)   Wt 59 kg (130 lb)   SpO2 96%   Breastfeeding No   BMI 25.39 kg/m²     Past Medical History:   Diagnosis Date    DVT (deep venous thrombosis) (HCC)     Essential hypertension 3/29/2018    GERD (gastroesophageal reflux disease) 3/29/2018    Hyperlipemia     Hyperlipidemia 3/29/2018    Hypertension     Left hip pain 3/29/2018    Multiple myeloma ([facility] [address] 06/2020     Peripheral IV 11/20/20 Left Hand (Active)   Site Assessment Clean, dry, & intact 11/20/20 1005   Phlebitis Assessment 0 11/20/20 1005   Infiltration Assessment 0 11/20/20 1005   Dressing Status Clean, dry, & intact 11/20/20 1005   Hub Color/Line Status Blue 11/20/20 1005              Venous Access Device power port 11/17/20 (Active)

## 2020-11-20 NOTE — DISCHARGE INSTRUCTIONS
[address]  Department of Interventional Radiology  [facility]  [phone] Office  [phone] Fax    BIOPSY DISCHARGE INSTRUCTIONS    General Instructions:     A biopsy is the removal of a small piece of tissue for microscopic examination or testing. Healthy tissue can be obtained for the purpose of tissue-type matching for transplants. Unhealthy tissues are more commonly biopsied to diagnose disease. Lung Biopsy:     A needle lung biopsy is performed when there is a mass discovered in the lung area. The most serious risk is infection, bleeding, and pneumothorax (a collapsed lung). Signs of pneumothorax include shortness of breath, rapid heart rate, and blueness of the skin. If any of these occur, call 911. Liver Biopsy: This test helps detect cancer, infections, and the cause of an enlargement of the liver or elevated liver enzymes. It also helps to diagnose a number of liver diseases. The pain associated with the procedure may be felt in the shoulder. The risks include internal bleeding, pneumothorax, and injury to the surrounding organs. Renal Biopsy: This procedure is sometimes done to evaluate a transplanted kidney. It is also used to evaluate unexplained decrease in kidney function, blood, or protein in the urine. You may see bright red blood in the urine the first 24 hours after the test. If the bleeding lasts longer, you need to call your doctor. There is a risk of infection and bleeding into the muscle, which may cause soreness. Spinal Biopsy: This test is sometimes done in conjunction with other procedures. Your back will be sore, as we are taking a small sample of bone, which is slightly more difficult to sample than tissue. General Biopsy:     A mass can grow in any area of the body, and we are taking a specimen as ordered by your doctor. The risks are the same.  They include bleeding, pain, and infection. Home Care Instructions: You may resume your regular diet and medication regimen. Do not drink alcohol, drive, or make any important legal decisions in the next 24 hours. Do not lift anything heavier than a gallon of milk until the soreness goes away. You may use over the counter acetaminophen or ibuprofen for the soreness. You may apply an ice pack to the affected area for 20-30 minutes at time for the first 24 hours. After that, you may apply a heat pack. Call If: You should call your Physician and/or the Radiology Nurse if you have any questions or concerns about the biopsy site. Call if you should have increased pain, fever, redness, drainage, or bleeding more than a small spot on the bandage. Follow-Up Instructions: Please see your ordering doctor as he/she has requested. To Reach Us: If you have any questions about your procedure, please call the Interventional Radiology department at [phone]. After business hours (5pm) and weekends, call the answering service at [phone] and ask for the Radiologist on call to be paged. Si tiene Preguntas acerca del procedimiento, por favor llame al departamento de Radiología Intervencional al [phone]. Después de horas de oficina (5 pm) y los fines de [place], llamar al Mika Herrera al [phone] y pregunte por el Radiologo de [facility]. Interventional Radiology General Nurse Discharge    After general anesthesia or intravenous sedation, for 24 hours or while taking prescription Narcotics:  · Limit your activities  · Do not drive and operate hazardous machinery  · Do not make important personal or business decisions  · Do  not drink alcoholic beverages  · If you have not urinated within 8 hours after discharge, please contact your surgeon on call. * Please give a list of your current medications to your Primary Care Provider.   * Please update this list whenever your medications are discontinued, doses are changed, or new medications (including over-the-counter products) are added. * Please carry medication information at all times in case of emergency situations. These are general instructions for a healthy lifestyle:    No smoking/ No tobacco products/ Avoid exposure to second hand smoke  Surgeon General's Warning:  Quitting smoking now greatly reduces serious risk to your health. Obesity, smoking, and sedentary lifestyle greatly increases your risk for illness  A healthy diet, regular physical exercise & weight monitoring are important for maintaining a healthy lifestyle    You may be retaining fluid if you have a history of heart failure or if you experience any of the following symptoms:  Weight gain of 3 pounds or more overnight or 5 pounds in a week, increased swelling in our hands or feet or shortness of breath while lying flat in bed. Please call your doctor as soon as you notice any of these symptoms; do not wait until your next office visit. Recognize signs and symptoms of STROKE:  F-face looks uneven    A-arms unable to move or move unevenly    S-speech slurred or non-existent    T-time-call 911 as soon as signs and symptoms begin-DO NOT go       Back to bed or wait to see if you get better-TIME IS BRAIN.       Patient Signature:  Date: 11/20/2020  Discharging Nurse: Micah Lee RN

## 2020-11-20 NOTE — H&P
Department of Interventional Radiology  [phone]    History and Physical    Patient:  Cliff Hughes MRN:  [id number]  SSN:  [id number]    YOB: 1960  Age:  56 y.o. Sex:  female      Primary Care Provider:  Audra Vásquez MD  Referring Physician:  Chelsy Morataya MD    Subjective:     Chief Complaint: Request for bone marrow biopsy    History of the Present Illness: The patient is a 56 y.o. female with hx of MM who presents for bone marrow biopsy. Recently had port placed with IR and has no complaints. She is NPO. Past Medical History:   Diagnosis Date    DVT (deep venous thrombosis) (HCC)     Essential hypertension 3/29/2018    GERD (gastroesophageal reflux disease) 3/29/2018    Hyperlipemia     Hyperlipidemia 3/29/2018    Hypertension     Left hip pain 3/29/2018    Multiple myeloma ([facility] [address] 06/2020     Past Surgical History:   Procedure Laterality Date    HX OTHER SURGICAL      open chest surgey    HX TUBAL LIGATION      IR INSERT TUNL CVC W PORT OVER 5 YEARS  11/17/2020        Review of Systems:    Pertinent items are noted in the History of Present Illness. Prior to Admission medications    Medication Sig Start Date End Date Taking? Authorizing Provider   ergocalciferol (ERGOCALCIFEROL) 1,250 mcg (50,000 unit) capsule Take 1 Cap by mouth every seven (7) days. 10/14/20  Yes Chelsy Morataya MD   acyclovir (ZOVIRAX) 800 mg tablet Take 800 mg by mouth daily. 7/30/20  Yes Provider, Historical   gabapentin (NEURONTIN) 300 mg capsule TAKE 1 CAPSULE BY MOUTH THREE TIMES DAILY 8/18/20  Yes Provider, Historical   lenalidomide 15 mg cap Take 1 daily for 14days for 14 day cycle 9/22/20  Yes Provider, Historical   traMADoL (ULTRAM) 50 mg tablet TAKE 1 TABLET BY MOUTH TWICE DAILY AS NEEDED FOR PAIN 9/26/20  Yes Provider, Historical   simvastatin (ZOCOR) 20 mg tablet Take 1 Tab by mouth nightly.  Indications: high cholesterol and high triglycerides 9/30/20  Yes Erin Dickson NP   lisinopriL (PRINIVIL, ZESTRIL) 10 mg tablet Take 1 Tab by mouth daily. Indications: high blood pressure 9/30/20  Yes Taylor San NP   conjugated estrogens (PREMARIN) 0.625 mg/gram vaginal cream Insert 0.5 g into vagina five (5) days a week. MAY NEED TO USE 2MONTHS BEFORE GOOD RESULTS 8/22/19  Yes Yon Lam MD   azithromycin (ZITHROMAX) 250 mg tablet Take two tablets today then one tablet daily 8/8/19  Yes Ginger Rutledge MD   fluticasone propionate (FLONASE) 50 mcg/actuation nasal spray 2 Sprays by Both Nostrils route daily. 8/8/19  Yes Ginger Rutledge MD   diclofenac (VOLTAREN XR) 100 mg ER tablet TAKE 1 TABLET BY MOUTH ONCE DAILY 7/11/19  Yes Provider, Historical   esomeprazole (NEXIUM) 20 mg capsule Take 1 Cap by mouth daily. 7/19/19  Yes Ginger Rutledge MD   dexAMETHasone (DECADRON) 4 mg tablet Take 5 tablets on days of tx 7/21/20   Provider, Historical   rivaroxaban (XARELTO) 20 mg tab tablet Take 20 mg by mouth daily. 8/24/20   Provider, Historical        Allergies   Allergen Reactions    Erythromycin Nausea Only    Penicillins Rash       Family History   Problem Relation Age of Onset    Breast Cancer Neg Hx     Colon Cancer Neg Hx     Ovarian Cancer Neg Hx      Social History     Tobacco Use    Smoking status: Never Smoker    Smokeless tobacco: Never Used   Substance Use Topics    Alcohol use: No        Objective:       Physical Examination:    Vitals:    11/20/20 1002   BP: (!) 147/68   Pulse: 72   Resp: 16   Temp: 98.6 °F (37 °C)   SpO2: 98%   Weight: 59 kg (130 lb)   Height: 5' (1.524 m)       Pain Assessment  Pain Intensity 1: 0 (11/20/20 1002)               General: WDWN in NAD. AAOx4. Respirations non-labored.     Laboratory:     Lab Results   Component Value Date/Time    Sodium 141 11/03/2020 12:34 PM    Sodium 141 10/06/2020 04:08 PM    Potassium 4.2 11/03/2020 12:34 PM    Potassium 3.7 10/06/2020 04:08 PM    Chloride 109 (H) 11/03/2020 12:34 PM    Chloride 108 (H) 10/06/2020 04:08 PM    CO2 29 11/03/2020 12:34 PM    CO2 29 10/06/2020 04:08 PM    Anion gap 3 (L) 11/03/2020 12:34 PM    Anion gap 4 (L) 10/06/2020 04:08 PM    Glucose 86 11/03/2020 12:34 PM    Glucose 98 10/06/2020 04:08 PM    BUN 13 11/03/2020 12:34 PM    BUN 15 10/06/2020 04:08 PM    Creatinine 0.80 11/03/2020 12:34 PM    Creatinine 0.90 10/06/2020 04:08 PM    GFR est AA >60 11/03/2020 12:34 PM    GFR est AA >60 10/06/2020 04:08 PM    GFR est non-AA >60 11/03/2020 12:34 PM    GFR est non-AA >60 10/06/2020 04:08 PM    Calcium 8.6 11/03/2020 12:34 PM    Calcium 8.8 10/06/2020 04:08 PM    Magnesium 2.2 11/03/2020 12:34 PM    Magnesium 1.9 03/28/2017 08:33 AM    Albumin 3.4 11/03/2020 12:34 PM    Albumin 3.5 10/06/2020 04:08 PM    Protein, total 6.5 11/03/2020 12:34 PM    Protein, total 6.2 (L) 11/03/2020 12:34 PM    Globulin 3.1 11/03/2020 12:34 PM    Globulin 3.2 10/06/2020 04:08 PM    A-G Ratio 1.1 (L) 11/03/2020 12:34 PM    A-G Ratio 1.3 11/03/2020 12:34 PM    ALT (SGPT) 48 11/03/2020 12:34 PM    ALT (SGPT) 48 10/06/2020 04:08 PM     Lab Results   Component Value Date/Time    WBC 5.3 11/03/2020 12:34 PM    WBC 8.2 10/06/2020 04:08 PM    HGB 12.7 11/03/2020 12:34 PM    HGB 12.5 10/06/2020 04:08 PM    HCT 40.1 11/03/2020 12:34 PM    HCT 39.4 10/06/2020 04:08 PM    PLATELET 219 02/24/2851 12:34 PM    PLATELET 376 29/12/3152 04:08 PM     No results found for: APTT, PTP, INR, INREXT    Assessment:     55 yo female with hx of multiple myeloma who presents for bone marrow bx. Hospital Problems  Date Reviewed: 8/8/2019    None          Plan:     Planned Procedure:  Bone marrow bx with moderate sedation. Risks, benefits, and alternatives reviewed with patient and she agrees to proceed with the procedure.       Signed By: Carmen Sanders MD     November 20, 2020

## 2020-11-20 NOTE — PROGRESS NOTES
11/20/20- BMT Education: Navigator met with Genesis Grigsby for BMT education. Patient given BMT education notebook. All sections discussed. Patient encouraged to write down any questions or concerns. Patient is aware of opportunity to meet again for any final questions. Content gone over verbally and given in writing including drug education sheets. Granix side effects, and administration guidelines discussed. Patient aware to report any left side pain during mobilization for the possibility of spleen enlargement or rupture from Granix. Patient aware splenic rupture is extremely rare. Patient stated understanding. The possible use of Mozobil injections was also discussed. Patient aware that mozobil may  started on Monday evening before collection begins. Patient educated on administration of mozobil and the rationale for use during mobilization process. Patient is aware of being NPO stating midnight on 12/6/20 Sunday for  apheresis CVC placement on Monday 12/7/20 and stated understanding. Apheresis consult completed 11/20/20 by Brad James. Patient aware of the possibility of collection being Tuesday through Friday. Mobilzation schedule given and gone over. Patient aware CVC will  be removed after collection. Patient stated understanding. High dose education gone over and written information given on Melphalan. Patient aware of possible side effects from chemo including but not limited oral mucositis, diarrhea, n/v, and hair loss. Patient aware of stem cell reinfusion will occur at least 24 hours after the chemo was completed (the next day). Patient aware to prepare for daily visits at least 3 weeks during high dose and the possibility of a 4th week. Patient aware of the need for a 24 hr caregiver during high dose through recovery. A copy of treatment consents given to patient for review. Patient is aware to review consent and sign prior to high dose therapy being initiated.  Patient aware of the risks involved with BMT including but not limited to infection, bleeding, engraftment failure, and death. Questions were answered and again encouraged patient to read material and write down any questions.  Patient aware of next appointment with Dr. Rosalie Caraballo  for pre-study results and mobilization eval.

## 2020-11-21 LAB
EBV NA IGG SER-ACNC: 57.2 U/ML (ref 0–17.9)
EBV VCA IGG SER-ACNC: 83.6 U/ML (ref 0–17.9)
EBV VCA IGM SER-ACNC: <36 U/ML (ref 0–35.9)
INTERPRETATION, 169995: ABNORMAL
VZV IGG SER IA-ACNC: 1053 INDEX

## 2020-11-23 ENCOUNTER — HOSPITAL ENCOUNTER (OUTPATIENT)
Dept: RESPIRATORY THERAPY | Age: 60
Discharge: HOME | End: 2020-11-23
Attending: INTERNAL MEDICINE
Payer: COMMERCIAL

## 2020-11-23 ENCOUNTER — APPOINTMENT (OUTPATIENT)
Dept: NON INVASIVE DIAGNOSTICS | Age: 60
End: 2020-11-23
Attending: INTERNAL MEDICINE
Payer: COMMERCIAL

## 2020-11-23 ENCOUNTER — HOSPITAL ENCOUNTER (OUTPATIENT)
Dept: NON INVASIVE DIAGNOSTICS | Age: 60
Discharge: HOME | End: 2020-11-23
Attending: INTERNAL MEDICINE | Admitting: INTERNAL MEDICINE
Payer: COMMERCIAL

## 2020-11-23 DIAGNOSIS — C90.00 MULTIPLE MYELOMA NOT HAVING ACHIEVED REMISSION (HCC): ICD-10-CM

## 2020-11-23 LAB
ATRIAL RATE: 59 BPM
CALCULATED P AXIS, ECG09: 54 DEGREES
CALCULATED R AXIS, ECG10: 53 DEGREES
CALCULATED T AXIS, ECG11: 46 DEGREES
DIAGNOSIS, 93000: NORMAL
P-R INTERVAL, ECG05: 166 MS
Q-T INTERVAL, ECG07: 464 MS
QRS DURATION, ECG06: 98 MS
QTC CALCULATION (BEZET), ECG08: 459 MS
VENTRICULAR RATE, ECG03: 59 BPM
VZV IGM SER IA-ACNC: <0.91 INDEX (ref 0–0.9)

## 2020-11-23 PROCEDURE — 93005 ELECTROCARDIOGRAM TRACING: CPT | Performed by: INTERNAL MEDICINE

## 2020-11-23 PROCEDURE — 94729 DIFFUSING CAPACITY: CPT

## 2020-11-23 PROCEDURE — 93010 ELECTROCARDIOGRAM REPORT: CPT | Performed by: INTERNAL MEDICINE

## 2020-11-23 PROCEDURE — 94726 PLETHYSMOGRAPHY LUNG VOLUMES: CPT

## 2020-11-23 PROCEDURE — 94010 BREATHING CAPACITY TEST: CPT

## 2020-11-23 PROCEDURE — 93306 TTE W/DOPPLER COMPLETE: CPT

## 2020-11-24 ENCOUNTER — HOSPITAL ENCOUNTER (OUTPATIENT)
Dept: PET IMAGING | Age: 60
Discharge: HOME | End: 2020-11-24
Payer: COMMERCIAL

## 2020-11-24 ENCOUNTER — APPOINTMENT (OUTPATIENT)
Dept: GENERAL RADIOLOGY | Age: 60
End: 2020-11-24
Attending: INTERNAL MEDICINE
Payer: COMMERCIAL

## 2020-11-24 DIAGNOSIS — C90.00 MULTIPLE MYELOMA NOT HAVING ACHIEVED REMISSION (HCC): ICD-10-CM

## 2020-11-24 LAB — HSV1+2 IGM SER IA-ACNC: <0.91 RATIO (ref 0–0.9)

## 2020-11-24 PROCEDURE — 71046 X-RAY EXAM CHEST 2 VIEWS: CPT

## 2020-11-24 PROCEDURE — A9552 F18 FDG: HCPCS

## 2020-11-24 PROCEDURE — 74011000636 HC RX REV CODE- 636: Performed by: INTERNAL MEDICINE

## 2020-11-24 RX ORDER — SODIUM CHLORIDE 0.9 % (FLUSH) 0.9 %
10 SYRINGE (ML) INJECTION
Status: COMPLETED | OUTPATIENT
Start: 2020-11-24 | End: 2020-11-24

## 2020-11-24 RX ADMIN — DIATRIZOATE MEGLUMINE AND DIATRIZOATE SODIUM 10 ML: 660; 100 LIQUID ORAL; RECTAL at 08:35

## 2020-11-24 RX ADMIN — Medication 10 ML: at 08:35

## 2020-11-24 NOTE — PROCEDURES
[facility]  PROCEDURE NOTE    Name:  Delgado Coppola  MR#:  [id number]  :  1960  ACCOUNT #:  [de-identified]  DATE OF SERVICE:  2020    COMPLETE PULMONARY FUNCTION TESTS    INTERPRETATION:  The flow volume loop is consistent with obstruction. Spirometry reveals mild obstruction. Lung volumes are normal.    The diffusion capacity adjusted for the patient's hemoglobin is mildly reduced.         Abdulkadir Salmeron MD      [id number]  D:  2020 17:17  T:  2020 0:07  JOB #:  [id number]

## 2020-11-25 ENCOUNTER — HOSPITAL ENCOUNTER (OUTPATIENT)
Dept: GENERAL RADIOLOGY | Age: 60
Discharge: HOME | End: 2020-11-25
Attending: INTERNAL MEDICINE
Payer: COMMERCIAL

## 2020-11-25 DIAGNOSIS — C90.00 MULTIPLE MYELOMA NOT HAVING ACHIEVED REMISSION (HCC): ICD-10-CM

## 2020-11-25 PROCEDURE — 77075 RADEX OSSEOUS SURVEY COMPL: CPT

## 2020-11-26 LAB — HSV1 IGG SER QL: NORMAL

## 2020-11-30 LAB
FLOW CYTOMETRY, FBTC1: NORMAL
SPECIMEN SOURCE: NORMAL
STEM CELL PANEL, XSTEMT: NORMAL
TEST ORDERED:: NORMAL

## 2020-12-03 ENCOUNTER — HOSPITAL ENCOUNTER (OUTPATIENT)
Dept: LAB | Age: 60
Discharge: HOME | End: 2020-12-03
Payer: COMMERCIAL

## 2020-12-03 DIAGNOSIS — C90.00 MULTIPLE MYELOMA NOT HAVING ACHIEVED REMISSION (HCC): ICD-10-CM

## 2020-12-03 LAB
ALBUMIN SERPL-MCNC: 3.4 G/DL (ref 3.2–4.6)
ALBUMIN/GLOB SERPL: 1.1 {RATIO} (ref 1.2–3.5)
ALP SERPL-CCNC: 56 U/L (ref 50–136)
ALT SERPL-CCNC: 29 U/L (ref 12–65)
ANION GAP SERPL CALC-SCNC: 7 MMOL/L (ref 7–16)
AST SERPL-CCNC: 19 U/L (ref 15–37)
BASOPHILS # BLD: 0 K/UL (ref 0–0.2)
BASOPHILS NFR BLD: 0 % (ref 0–2)
BILIRUB SERPL-MCNC: 0.3 MG/DL (ref 0.2–1.1)
BUN SERPL-MCNC: 15 MG/DL (ref 8–23)
CALCIUM SERPL-MCNC: 8.9 MG/DL (ref 8.3–10.4)
CHLORIDE SERPL-SCNC: 109 MMOL/L (ref 98–107)
CO2 SERPL-SCNC: 27 MMOL/L (ref 21–32)
CREAT SERPL-MCNC: 0.9 MG/DL (ref 0.6–1)
DIFFERENTIAL METHOD BLD: ABNORMAL
EOSINOPHIL # BLD: 0.1 K/UL (ref 0–0.8)
EOSINOPHIL NFR BLD: 1 % (ref 0.5–7.8)
ERYTHROCYTE [DISTWIDTH] IN BLOOD BY AUTOMATED COUNT: 15.4 % (ref 11.9–14.6)
GLOBULIN SER CALC-MCNC: 3 G/DL (ref 2.3–3.5)
GLUCOSE SERPL-MCNC: 88 MG/DL (ref 65–100)
HCT VFR BLD AUTO: 38.5 % (ref 35.8–46.3)
HGB BLD-MCNC: 12.3 G/DL (ref 11.7–15.4)
IMM GRANULOCYTES # BLD AUTO: 0 K/UL (ref 0–0.5)
IMM GRANULOCYTES NFR BLD AUTO: 0 % (ref 0–5)
LYMPHOCYTES # BLD: 1.1 K/UL (ref 0.5–4.6)
LYMPHOCYTES NFR BLD: 26 % (ref 13–44)
MAGNESIUM SERPL-MCNC: 2.2 MG/DL (ref 1.8–2.4)
MCH RBC QN AUTO: 30.8 PG (ref 26.1–32.9)
MCHC RBC AUTO-ENTMCNC: 31.9 G/DL (ref 31.4–35)
MCV RBC AUTO: 96.5 FL (ref 79.6–97.8)
MONOCYTES # BLD: 0.4 K/UL (ref 0.1–1.3)
MONOCYTES NFR BLD: 8 % (ref 4–12)
NEUTS SEG # BLD: 2.8 K/UL (ref 1.7–8.2)
NEUTS SEG NFR BLD: 65 % (ref 43–78)
NRBC # BLD: 0 K/UL (ref 0–0.2)
PLATELET # BLD AUTO: 273 K/UL (ref 150–450)
PMV BLD AUTO: 9.3 FL (ref 9.4–12.3)
POTASSIUM SERPL-SCNC: 3.9 MMOL/L (ref 3.5–5.1)
PROT SERPL-MCNC: 6.4 G/DL (ref 6.3–8.2)
RBC # BLD AUTO: 3.99 M/UL (ref 4.05–5.25)
SODIUM SERPL-SCNC: 143 MMOL/L (ref 136–145)
WBC # BLD AUTO: 4.4 K/UL (ref 4.3–11.1)

## 2020-12-03 PROCEDURE — 86334 IMMUNOFIX E-PHORESIS SERUM: CPT

## 2020-12-03 PROCEDURE — 80053 COMPREHEN METABOLIC PANEL: CPT

## 2020-12-03 PROCEDURE — 83021 HEMOGLOBIN CHROMOTOGRAPHY: CPT

## 2020-12-03 PROCEDURE — 82232 ASSAY OF BETA-2 PROTEIN: CPT

## 2020-12-03 PROCEDURE — 36415 COLL VENOUS BLD VENIPUNCTURE: CPT

## 2020-12-03 PROCEDURE — 83735 ASSAY OF MAGNESIUM: CPT

## 2020-12-03 PROCEDURE — 85025 COMPLETE CBC W/AUTO DIFF WBC: CPT

## 2020-12-03 PROCEDURE — 83883 ASSAY NEPHELOMETRY NOT SPEC: CPT

## 2020-12-03 PROCEDURE — 82784 ASSAY IGA/IGD/IGG/IGM EACH: CPT

## 2020-12-04 ENCOUNTER — HOSPITAL ENCOUNTER (OUTPATIENT)
Dept: INFUSION THERAPY | Age: 60
Discharge: HOME | End: 2020-12-04
Payer: COMMERCIAL

## 2020-12-04 ENCOUNTER — TELEPHONE (OUTPATIENT)
Dept: ONCOLOGY | Age: 60
End: 2020-12-04

## 2020-12-04 VITALS
OXYGEN SATURATION: 98 % | BODY MASS INDEX: 26.21 KG/M2 | SYSTOLIC BLOOD PRESSURE: 141 MMHG | WEIGHT: 134.2 LBS | TEMPERATURE: 96.8 F | RESPIRATION RATE: 18 BRPM | HEART RATE: 74 BPM | DIASTOLIC BLOOD PRESSURE: 83 MMHG

## 2020-12-04 DIAGNOSIS — C90.00 MULTIPLE MYELOMA NOT HAVING ACHIEVED REMISSION (HCC): Primary | ICD-10-CM

## 2020-12-04 LAB
ALBUMIN SERPL ELPH-MCNC: 3.68 G/DL (ref 3.2–5.6)
ALBUMIN/GLOB SERPL: 1.6 {RATIO} (ref 1.2–3.5)
ALPHA1 GLOB SERPL ELPH-MCNC: 0.23 G/DL (ref 0.1–0.4)
ALPHA2 GLOB SERPL ELPH-MCNC: 0.67 G/DL (ref 0.4–1.2)
B-GLOBULIN SERPL QL ELPH: 0.96 G/DL (ref 0.6–1.3)
B2 MICROGLOB SERPL-MCNC: 1.9 MG/L (ref 0.8–2.34)
DEPRECATED HGB OTHER BLD-IMP: 0 %
GAMMA GLOB MFR SERPL ELPH: 0.45 G/DL (ref 0.5–1.6)
HGB A MFR BLD: 97.8 % (ref 96.4–98.8)
HGB A2 MFR BLD COLUMN CHROM: 2.2 % (ref 1.8–3.2)
HGB C MFR BLD: 0 %
HGB F MFR BLD: 0 % (ref 0–2)
HGB FRACT BLD-IMP: NORMAL
HGB S BLD QL SOLY: NEGATIVE
HGB S MFR BLD: 0 %
IGA SERPL-MCNC: 22 MG/DL (ref 85–499)
IGG SERPL-MCNC: 500 MG/DL (ref 610–1616)
IGM SERPL-MCNC: 16 MG/DL (ref 35–242)
KAPPA LC FREE SER-MCNC: 6.22 MG/L (ref 3.3–19.4)
KAPPA LC FREE/LAMBDA FREE SER: 0.13 {RATIO} (ref 0.26–1.65)
LAMBDA LC FREE SERPL-MCNC: 46.62 MG/L (ref 5.71–26.3)
M PROTEIN SERPL ELPH-MCNC: ABNORMAL G/DL
PROT PATTERN SERPL ELPH-IMP: ABNORMAL
PROT PATTERN SPEC IFE-IMP: ABNORMAL
PROT SERPL-MCNC: 6 G/DL (ref 6.3–8.2)

## 2020-12-04 PROCEDURE — 82306 VITAMIN D 25 HYDROXY: CPT

## 2020-12-04 PROCEDURE — 96372 THER/PROPH/DIAG INJ SC/IM: CPT

## 2020-12-04 PROCEDURE — 74011250636 HC RX REV CODE- 250/636: Performed by: INTERNAL MEDICINE

## 2020-12-04 RX ADMIN — TBO-FILGRASTIM 600 MCG: 300 INJECTION, SOLUTION SUBCUTANEOUS at 08:20

## 2020-12-04 NOTE — PROGRESS NOTES
Mobilization Day 1. Labs needed at next visit : in Support Plan. Next Appointment scheduled 12/5 at 8:00.   WBC/ANC= 4.4/2.8  Apheresis catheter placement scheduled for Monday,12/7/20   New orders received: Vitamin D Drawn  Issues: none

## 2020-12-04 NOTE — TELEPHONE ENCOUNTER
SW attempted to follow up with pt and assess. SW left voicemail offering services PRN with SW contact information. SW received call back from pt and pt did not report any needs at this time. SW encouraged her to call should any needs arise and pt verbalized understanding and appreciation. SW intends to remain available to assist PRN.

## 2020-12-05 ENCOUNTER — HOSPITAL ENCOUNTER (OUTPATIENT)
Dept: INFUSION THERAPY | Age: 60
Discharge: HOME | End: 2020-12-05
Payer: COMMERCIAL

## 2020-12-05 VITALS
SYSTOLIC BLOOD PRESSURE: 121 MMHG | TEMPERATURE: 96.4 F | RESPIRATION RATE: 18 BRPM | WEIGHT: 134 LBS | OXYGEN SATURATION: 98 % | DIASTOLIC BLOOD PRESSURE: 73 MMHG | HEART RATE: 95 BPM | BODY MASS INDEX: 26.17 KG/M2

## 2020-12-05 DIAGNOSIS — C90.00 MULTIPLE MYELOMA NOT HAVING ACHIEVED REMISSION (HCC): Primary | ICD-10-CM

## 2020-12-05 LAB
25(OH)D3+25(OH)D2 SERPL-MCNC: 42.7 NG/ML (ref 30–100)
ANION GAP SERPL CALC-SCNC: 5 MMOL/L (ref 7–16)
BASOPHILS # BLD: 0 K/UL (ref 0–0.2)
BASOPHILS NFR BLD: 0 % (ref 0–2)
BUN SERPL-MCNC: 14 MG/DL (ref 8–23)
CALCIUM SERPL-MCNC: 8.8 MG/DL (ref 8.3–10.4)
CHLORIDE SERPL-SCNC: 111 MMOL/L (ref 98–107)
CO2 SERPL-SCNC: 27 MMOL/L (ref 21–32)
CREAT SERPL-MCNC: 0.8 MG/DL (ref 0.6–1)
DIFFERENTIAL METHOD BLD: ABNORMAL
EOSINOPHIL # BLD: 0.2 K/UL (ref 0–0.8)
EOSINOPHIL NFR BLD: 1 % (ref 0.5–7.8)
ERYTHROCYTE [DISTWIDTH] IN BLOOD BY AUTOMATED COUNT: 15.5 % (ref 11.9–14.6)
GLUCOSE SERPL-MCNC: 70 MG/DL (ref 65–100)
HCT VFR BLD AUTO: 35.8 % (ref 35.8–46.3)
HGB BLD-MCNC: 11.3 G/DL (ref 11.7–15.4)
IMM GRANULOCYTES # BLD AUTO: 0.5 K/UL (ref 0–0.5)
IMM GRANULOCYTES NFR BLD AUTO: 3 % (ref 0–5)
LYMPHOCYTES # BLD: 1.3 K/UL (ref 0.5–4.6)
LYMPHOCYTES NFR BLD: 7 % (ref 13–44)
MAGNESIUM SERPL-MCNC: 2.2 MG/DL (ref 1.8–2.4)
MCH RBC QN AUTO: 30.9 PG (ref 26.1–32.9)
MCHC RBC AUTO-ENTMCNC: 31.6 G/DL (ref 31.4–35)
MCV RBC AUTO: 97.8 FL (ref 79.6–97.8)
MONOCYTES # BLD: 1.3 K/UL (ref 0.1–1.3)
MONOCYTES NFR BLD: 7 % (ref 4–12)
NEUTS SEG # BLD: 15.4 K/UL (ref 1.7–8.2)
NEUTS SEG NFR BLD: 82 % (ref 43–78)
NRBC # BLD: 0 K/UL (ref 0–0.2)
PLATELET # BLD AUTO: 247 K/UL (ref 150–450)
PMV BLD AUTO: 10 FL (ref 9.4–12.3)
POTASSIUM SERPL-SCNC: 3.8 MMOL/L (ref 3.5–5.1)
RBC # BLD AUTO: 3.66 M/UL (ref 4.05–5.25)
SODIUM SERPL-SCNC: 143 MMOL/L (ref 136–145)
WBC # BLD AUTO: 18.7 K/UL (ref 4.3–11.1)

## 2020-12-05 PROCEDURE — 84166 PROTEIN E-PHORESIS/URINE/CSF: CPT

## 2020-12-05 PROCEDURE — 74011250636 HC RX REV CODE- 250/636: Performed by: INTERNAL MEDICINE

## 2020-12-05 PROCEDURE — 96372 THER/PROPH/DIAG INJ SC/IM: CPT

## 2020-12-05 PROCEDURE — 83735 ASSAY OF MAGNESIUM: CPT

## 2020-12-05 PROCEDURE — 85025 COMPLETE CBC W/AUTO DIFF WBC: CPT

## 2020-12-05 PROCEDURE — 80048 BASIC METABOLIC PNL TOTAL CA: CPT

## 2020-12-05 RX ORDER — SODIUM CHLORIDE 0.9 % (FLUSH) 0.9 %
10-40 SYRINGE (ML) INJECTION AS NEEDED
Status: ACTIVE | OUTPATIENT
Start: 2020-12-05 | End: 2020-12-05

## 2020-12-05 RX ADMIN — TBO-FILGRASTIM 600 MCG: 300 INJECTION, SOLUTION SUBCUTANEOUS at 09:00

## 2020-12-05 NOTE — PROGRESS NOTES
Mobilization Day 2. Labs needed at next visit CBC,CMP, MAG, PT/INR, PTT. Next Appointment scheduled 12/6/2020. WBC/ANC= 18.7/15.4. Apheresis catheter placement scheduled for Monday 12/7/2020. New orders received None. Issues: None  Pt discharged ambulatory with spouse.

## 2020-12-06 ENCOUNTER — HOSPITAL ENCOUNTER (OUTPATIENT)
Dept: INFUSION THERAPY | Age: 60
Discharge: HOME | End: 2020-12-06
Payer: COMMERCIAL

## 2020-12-06 VITALS
HEART RATE: 78 BPM | DIASTOLIC BLOOD PRESSURE: 80 MMHG | BODY MASS INDEX: 26.44 KG/M2 | OXYGEN SATURATION: 100 % | WEIGHT: 135.4 LBS | TEMPERATURE: 98.2 F | RESPIRATION RATE: 18 BRPM | SYSTOLIC BLOOD PRESSURE: 124 MMHG

## 2020-12-06 DIAGNOSIS — C90.00 MULTIPLE MYELOMA NOT HAVING ACHIEVED REMISSION (HCC): Primary | ICD-10-CM

## 2020-12-06 LAB
ANION GAP SERPL CALC-SCNC: 5 MMOL/L (ref 7–16)
APTT PPP: 26.8 SEC (ref 24.1–35.1)
BASOPHILS # BLD: 0 K/UL (ref 0–0.2)
BASOPHILS NFR BLD: 0 % (ref 0–2)
BUN SERPL-MCNC: 11 MG/DL (ref 8–23)
CALCIUM SERPL-MCNC: 8.6 MG/DL (ref 8.3–10.4)
CHLORIDE SERPL-SCNC: 112 MMOL/L (ref 98–107)
CO2 SERPL-SCNC: 25 MMOL/L (ref 21–32)
CREAT SERPL-MCNC: 0.7 MG/DL (ref 0.6–1)
DIFFERENTIAL METHOD BLD: ABNORMAL
EOSINOPHIL # BLD: 0.2 K/UL (ref 0–0.8)
EOSINOPHIL NFR BLD: 1 % (ref 0.5–7.8)
ERYTHROCYTE [DISTWIDTH] IN BLOOD BY AUTOMATED COUNT: 15.9 % (ref 11.9–14.6)
GLUCOSE SERPL-MCNC: 135 MG/DL (ref 65–100)
HCT VFR BLD AUTO: 34.3 % (ref 35.8–46.3)
HGB BLD-MCNC: 10.9 G/DL (ref 11.7–15.4)
IMM GRANULOCYTES # BLD AUTO: 3.4 K/UL (ref 0–0.5)
IMM GRANULOCYTES NFR BLD AUTO: 15 % (ref 0–5)
INR PPP: 1
LYMPHOCYTES # BLD: 1.8 K/UL (ref 0.5–4.6)
LYMPHOCYTES NFR BLD: 8 % (ref 13–44)
MAGNESIUM SERPL-MCNC: 2 MG/DL (ref 1.8–2.4)
MCH RBC QN AUTO: 31.2 PG (ref 26.1–32.9)
MCHC RBC AUTO-ENTMCNC: 31.8 G/DL (ref 31.4–35)
MCV RBC AUTO: 98.3 FL (ref 79.6–97.8)
MONOCYTES # BLD: 1.6 K/UL (ref 0.1–1.3)
MONOCYTES NFR BLD: 7 % (ref 4–12)
NEUTS SEG # BLD: 15.6 K/UL (ref 1.7–8.2)
NEUTS SEG NFR BLD: 69 % (ref 43–78)
NRBC # BLD: 0 K/UL (ref 0–0.2)
PLATELET # BLD AUTO: 225 K/UL (ref 150–450)
PLATELET COMMENTS,PCOM: ADEQUATE
PMV BLD AUTO: 9.7 FL (ref 9.4–12.3)
POTASSIUM SERPL-SCNC: 4 MMOL/L (ref 3.5–5.1)
PROTHROMBIN TIME: 13 SEC (ref 12.5–14.7)
RBC # BLD AUTO: 3.49 M/UL (ref 4.05–5.25)
RBC MORPH BLD: ABNORMAL
SODIUM SERPL-SCNC: 142 MMOL/L (ref 136–145)
WBC # BLD AUTO: 22.6 K/UL (ref 4.3–11.1)
WBC MORPH BLD: ABNORMAL

## 2020-12-06 PROCEDURE — 85730 THROMBOPLASTIN TIME PARTIAL: CPT

## 2020-12-06 PROCEDURE — 80048 BASIC METABOLIC PNL TOTAL CA: CPT

## 2020-12-06 PROCEDURE — 85610 PROTHROMBIN TIME: CPT

## 2020-12-06 PROCEDURE — 74011250636 HC RX REV CODE- 250/636: Performed by: INTERNAL MEDICINE

## 2020-12-06 PROCEDURE — 85025 COMPLETE CBC W/AUTO DIFF WBC: CPT

## 2020-12-06 PROCEDURE — 83735 ASSAY OF MAGNESIUM: CPT

## 2020-12-06 PROCEDURE — 96372 THER/PROPH/DIAG INJ SC/IM: CPT

## 2020-12-06 RX ORDER — SODIUM CHLORIDE 0.9 % (FLUSH) 0.9 %
10-40 SYRINGE (ML) INJECTION AS NEEDED
Status: ACTIVE | OUTPATIENT
Start: 2020-12-06 | End: 2020-12-06

## 2020-12-06 RX ADMIN — TBO-FILGRASTIM 600 MCG: 300 INJECTION, SOLUTION SUBCUTANEOUS at 10:18

## 2020-12-06 RX ADMIN — Medication 10 ML: at 09:44

## 2020-12-06 NOTE — PROGRESS NOTES
Mobilization Day 3. Labs needed at next visit CBC, CMP, Mg, Phos, CD34. Next Appointment scheduled tomorrow at 8 am  WBC/ANC= 22.6/15. 6. Apheresis catheter placement scheduled for tomorrow  New orders received- none. Issues-   Pt c/o lower back pain and sternum pain (likely related to Granix injections). Pt instructed to continue daily Claritin and also she may take home PTA pain medication as previously prescribed as needed. Pt aware of tomorrow's appt at 8 am, and verbalized understanding of NPO past midnight for line placement. Discharged ambulatory. No distress noted.

## 2020-12-07 ENCOUNTER — HOSPITAL ENCOUNTER (OUTPATIENT)
Dept: INFUSION THERAPY | Age: 60
Discharge: HOME | End: 2020-12-07
Payer: COMMERCIAL

## 2020-12-07 ENCOUNTER — HOSPITAL ENCOUNTER (OUTPATIENT)
Dept: INTERVENTIONAL RADIOLOGY/VASCULAR | Age: 60
Discharge: HOME | End: 2020-12-07
Attending: INTERNAL MEDICINE
Payer: COMMERCIAL

## 2020-12-07 VITALS
BODY MASS INDEX: 26.33 KG/M2 | RESPIRATION RATE: 18 BRPM | DIASTOLIC BLOOD PRESSURE: 73 MMHG | WEIGHT: 134.8 LBS | HEART RATE: 78 BPM | TEMPERATURE: 96.6 F | SYSTOLIC BLOOD PRESSURE: 113 MMHG | OXYGEN SATURATION: 97 %

## 2020-12-07 VITALS
RESPIRATION RATE: 18 BRPM | SYSTOLIC BLOOD PRESSURE: 159 MMHG | HEART RATE: 99 BPM | TEMPERATURE: 98.1 F | DIASTOLIC BLOOD PRESSURE: 93 MMHG | OXYGEN SATURATION: 99 %

## 2020-12-07 VITALS
HEART RATE: 77 BPM | BODY MASS INDEX: 26.11 KG/M2 | DIASTOLIC BLOOD PRESSURE: 62 MMHG | OXYGEN SATURATION: 98 % | WEIGHT: 133 LBS | TEMPERATURE: 98.4 F | RESPIRATION RATE: 16 BRPM | SYSTOLIC BLOOD PRESSURE: 124 MMHG | HEIGHT: 60 IN

## 2020-12-07 DIAGNOSIS — C90.00 MULTIPLE MYELOMA NOT HAVING ACHIEVED REMISSION (HCC): Primary | ICD-10-CM

## 2020-12-07 DIAGNOSIS — C90.00 MULTIPLE MYELOMA NOT HAVING ACHIEVED REMISSION (HCC): ICD-10-CM

## 2020-12-07 LAB
ALBUMIN SERPL-MCNC: 3.3 G/DL (ref 3.2–4.6)
ALBUMIN/GLOB SERPL: 1.3 {RATIO} (ref 1.2–3.5)
ALP SERPL-CCNC: 83 U/L (ref 50–136)
ALT SERPL-CCNC: 20 U/L (ref 12–65)
ANION GAP SERPL CALC-SCNC: 6 MMOL/L (ref 7–16)
AST SERPL-CCNC: 11 U/L (ref 15–37)
BASOPHILS # BLD: 0.2 K/UL (ref 0–0.2)
BASOPHILS NFR BLD: 1 % (ref 0–2)
BILIRUB SERPL-MCNC: 0.3 MG/DL (ref 0.2–1.1)
BUN SERPL-MCNC: 12 MG/DL (ref 8–23)
CALCIUM SERPL-MCNC: 8.5 MG/DL (ref 8.3–10.4)
CD34,XCD34T: NORMAL
CHLORIDE SERPL-SCNC: 111 MMOL/L (ref 98–107)
CO2 SERPL-SCNC: 26 MMOL/L (ref 21–32)
CREAT SERPL-MCNC: 0.8 MG/DL (ref 0.6–1)
DIFFERENTIAL METHOD BLD: ABNORMAL
EOSINOPHIL # BLD: 0.2 K/UL (ref 0–0.8)
EOSINOPHIL NFR BLD: 1 % (ref 0.5–7.8)
ERYTHROCYTE [DISTWIDTH] IN BLOOD BY AUTOMATED COUNT: 15.9 % (ref 11.9–14.6)
GLOBULIN SER CALC-MCNC: 2.5 G/DL (ref 2.3–3.5)
GLUCOSE SERPL-MCNC: 88 MG/DL (ref 65–100)
HCT VFR BLD AUTO: 34.1 % (ref 35.8–46.3)
HGB BLD-MCNC: 10.8 G/DL (ref 11.7–15.4)
IMM GRANULOCYTES # BLD AUTO: 1.1 K/UL (ref 0–0.5)
IMM GRANULOCYTES NFR BLD AUTO: 5 % (ref 0–5)
LYMPHOCYTES # BLD: 1.7 K/UL (ref 0.5–4.6)
LYMPHOCYTES NFR BLD: 8 % (ref 13–44)
MAGNESIUM SERPL-MCNC: 2.2 MG/DL (ref 1.8–2.4)
MCH RBC QN AUTO: 31.2 PG (ref 26.1–32.9)
MCHC RBC AUTO-ENTMCNC: 31.7 G/DL (ref 31.4–35)
MCV RBC AUTO: 98.6 FL (ref 79.6–97.8)
MONOCYTES # BLD: 2.2 K/UL (ref 0.1–1.3)
MONOCYTES NFR BLD: 10 % (ref 4–12)
NEUTS SEG # BLD: 16.4 K/UL (ref 1.7–8.2)
NEUTS SEG NFR BLD: 75 % (ref 43–78)
NRBC # BLD: 0.03 K/UL (ref 0–0.2)
PHOSPHATE SERPL-MCNC: 2.3 MG/DL (ref 2.3–3.7)
PLATELET # BLD AUTO: 205 K/UL (ref 150–450)
PLATELET COMMENTS,PCOM: ADEQUATE
PMV BLD AUTO: 9.8 FL (ref 9.4–12.3)
POTASSIUM SERPL-SCNC: 3.7 MMOL/L (ref 3.5–5.1)
PROT SERPL-MCNC: 5.8 G/DL (ref 6.3–8.2)
RBC # BLD AUTO: 3.46 M/UL (ref 4.05–5.25)
RBC MORPH BLD: ABNORMAL
SODIUM SERPL-SCNC: 143 MMOL/L (ref 136–145)
WBC # BLD AUTO: 21.8 K/UL (ref 4.3–11.1)
WBC MORPH BLD: ABNORMAL

## 2020-12-07 PROCEDURE — 74011000250 HC RX REV CODE- 250: Performed by: PHYSICIAN ASSISTANT

## 2020-12-07 PROCEDURE — 84100 ASSAY OF PHOSPHORUS: CPT

## 2020-12-07 PROCEDURE — C1752 CATH,HEMODIALYSIS,SHORT-TERM: HCPCS

## 2020-12-07 PROCEDURE — 74011250636 HC RX REV CODE- 250/636: Performed by: INTERNAL MEDICINE

## 2020-12-07 PROCEDURE — 83735 ASSAY OF MAGNESIUM: CPT

## 2020-12-07 PROCEDURE — 77030002916 HC SUT ETHLN J&J -A

## 2020-12-07 PROCEDURE — C1894 INTRO/SHEATH, NON-LASER: HCPCS

## 2020-12-07 PROCEDURE — 77030018719 HC DRSG PTCH ANTIMIC J&J -A

## 2020-12-07 PROCEDURE — 74011250636 HC RX REV CODE- 250/636: Performed by: PHYSICIAN ASSISTANT

## 2020-12-07 PROCEDURE — 36556 INSERT NON-TUNNEL CV CATH: CPT

## 2020-12-07 PROCEDURE — 86367 STEM CELLS TOTAL COUNT: CPT

## 2020-12-07 PROCEDURE — 85025 COMPLETE CBC W/AUTO DIFF WBC: CPT

## 2020-12-07 PROCEDURE — 80053 COMPREHEN METABOLIC PANEL: CPT

## 2020-12-07 PROCEDURE — 96372 THER/PROPH/DIAG INJ SC/IM: CPT

## 2020-12-07 RX ORDER — SODIUM CHLORIDE 0.9 % (FLUSH) 0.9 %
10 SYRINGE (ML) INJECTION AS NEEDED
Status: DISCONTINUED | OUTPATIENT
Start: 2020-12-07 | End: 2020-12-09 | Stop reason: HOSPADM

## 2020-12-07 RX ORDER — LIDOCAINE HYDROCHLORIDE 20 MG/ML
2-20 INJECTION, SOLUTION INFILTRATION; PERINEURAL ONCE
Status: COMPLETED | OUTPATIENT
Start: 2020-12-07 | End: 2020-12-07

## 2020-12-07 RX ORDER — HEPARIN SODIUM 1000 [USP'U]/ML
2000-5000 INJECTION, SOLUTION INTRAVENOUS; SUBCUTANEOUS ONCE
Status: COMPLETED | OUTPATIENT
Start: 2020-12-07 | End: 2020-12-07

## 2020-12-07 RX ADMIN — HEPARIN SODIUM 2600 UNITS: 1000 INJECTION INTRAVENOUS; SUBCUTANEOUS at 10:13

## 2020-12-07 RX ADMIN — PLERIXAFOR 14 MG: 24 SOLUTION SUBCUTANEOUS at 17:58

## 2020-12-07 RX ADMIN — Medication 10 ML: at 08:40

## 2020-12-07 RX ADMIN — LIDOCAINE HYDROCHLORIDE 150 MG: 20 INJECTION, SOLUTION INFILTRATION; PERINEURAL at 10:08

## 2020-12-07 RX ADMIN — TBO-FILGRASTIM 600 MCG: 300 INJECTION, SOLUTION SUBCUTANEOUS at 08:33

## 2020-12-07 RX ADMIN — Medication 10 ML: at 08:15

## 2020-12-07 NOTE — PROGRESS NOTES
Pt arrived ambulatory, mozobil given, monitored for 30 min with no reaction, discharged home, pt aware of appointment at 12 tomorrow

## 2020-12-07 NOTE — PROCEDURES
Department of Interventional Radiology  [phone]        Interventional Radiology Brief Procedure Note    Patient: Letitia Olsen MRN: [id number]  SSN: [id number]    YOB: 1960  Age: 56 y.o. Sex: female      Date of Procedure: 12/7/2020    Pre-Procedure Diagnosis: multiple myeloma    Post-Procedure Diagnosis: SAME    Procedure(s): Temporary Central Venous Catheter    Brief Description of Procedure: US, fluoro guided left IJ temporary apheresis catheter placed.      Performed By: Lauryn Graves PA-C     Assistants: None    Anesthesia:Lidocaine    Estimated Blood Loss: Less than 10ml    Specimens:  None    Implants:  Temporary Apheresis Catheter    Findings: catheter tip in right atrium     Complications: None    Recommendations: ok to use catheter     Follow Up: prn    Signed By: Lauryn Graves PA-C     December 7, 2020

## 2020-12-07 NOTE — DISCHARGE INSTRUCTIONS
[address]  Department of Interventional Radiology  [facility]  [phone] Office  [phone] Fax  Implanted Port Discharge Instructions      General Instructions:   A port is like an implanted IV. They are usually ordered for patients who will be getting chemotherapy, but can also be used as an IV for long term antibiotics, large amounts of fluids, and/or blood products. Your blood can be drawn from your port for labs also. Those patients who do not have good veins find the ports convenient as they can get the IV they need with one stick. The port can be used long term, and the care is easy. The device is under the skin, and once the skin heals, care is minimal. All that is required is the nurse who accesses the port will need to flush it with heparinized saline after each use. Ports are usually placed in the chest wall, usually on the right side. But they can be place in the arms and in the abdomen. Home Care Instructions: If your port is in your arm, do not allow blood pressure or other IVs to be place in that arm. Do not allow bra straps or any clothing to rub the skin over the port. Do not bathe or swim until the skin has healed and if the port is accessed. Once it is healed, and when the port is not accessed, it is okay to bathe and swim. Restrict yourself to light activity for the first 5 days after getting the port put in, after that, resume normal activity slowly. You may resume your normal diet and medications. Follow-Up Instructions: Please see your oncologist, or whatever physician ordered the port as he/she has requested of you. Call If: You should call your Physician and/or the Radiology Nurse if you notice redness, pus, swelling, or pain from the area of your incision. Call if you should develop a fever. The nurses who access your port will know to call your doctor if the port does not seem to be working properly. You need to tell the nurses who use the port if you should have any pain or swelling at the site during an infusion. To Reach Us: If you have any questions about your procedure, please call the Interventional Radiology department at [phone]. After business hours (5pm) and weekends, call the answering service at [phone] and ask for the Radiologist on call to be paged. Interventional Radiology General Nurse Discharge    After general anesthesia or intravenous sedation, for 24 hours or while taking prescription Narcotics:  · Limit your activities  · Do not drive and operate hazardous machinery  · Do not make important personal or business decisions  · Do  not drink alcoholic beverages  · If you have not urinated within 8 hours after discharge, please contact your surgeon on call. * Please give a list of your current medications to your Primary Care Provider. * Please update this list whenever your medications are discontinued, doses are     changed, or new medications (including over-the-counter products) are added. * Please carry medication information at all times in case of emergency situations. These are general instructions for a healthy lifestyle:    No smoking/ No tobacco products/ Avoid exposure to second hand smoke  Surgeon General's Warning:  Quitting smoking now greatly reduces serious risk to your health. Obesity, smoking, and sedentary lifestyle greatly increases your risk for illness  A healthy diet, regular physical exercise & weight monitoring are important for maintaining a healthy lifestyle    You may be retaining fluid if you have a history of heart failure or if you experience any of the following symptoms:  Weight gain of 3 pounds or more overnight or 5 pounds in a week, increased swelling in our hands or feet or shortness of breath while lying flat in bed.   Please call your doctor as soon as you notice any of these symptoms; do not wait until your next office visit. Recognize signs and symptoms of STROKE:  F-face looks uneven    A-arms unable to move or move unevenly    S-speech slurred or non-existent    T-time-call 911 as soon as signs and symptoms begin-DO NOT go       Back to bed or wait to see if you get better-TIME IS BRAIN.       Patient Signature:  Date: 12/7/2020  Discharging Nurse: Fernando Wilkes

## 2020-12-08 ENCOUNTER — HOSPITAL ENCOUNTER (OUTPATIENT)
Dept: INFUSION THERAPY | Age: 60
Discharge: HOME | End: 2020-12-08
Payer: COMMERCIAL

## 2020-12-08 VITALS
HEART RATE: 95 BPM | OXYGEN SATURATION: 99 % | TEMPERATURE: 98.4 F | WEIGHT: 134.6 LBS | SYSTOLIC BLOOD PRESSURE: 121 MMHG | BODY MASS INDEX: 26.29 KG/M2 | RESPIRATION RATE: 16 BRPM | DIASTOLIC BLOOD PRESSURE: 74 MMHG

## 2020-12-08 DIAGNOSIS — C90.00 MULTIPLE MYELOMA NOT HAVING ACHIEVED REMISSION (HCC): Primary | ICD-10-CM

## 2020-12-08 LAB
ABO + RH BLD: NORMAL
ALBUMIN UR ELPH-MCNC: 1.6 MG/DL
ALBUMIN/GLOB SERPL: 0.4 {RATIO}
ALPHA1 GLOB 24H UR ELPH-MCNC: 0.5 MG/DL
ALPHA2 GLOB SERPL ELPH-MCNC: 1 MG/DL
ANION GAP SERPL CALC-SCNC: 8 MMOL/L (ref 7–16)
B-GLOBULIN UR QL ELPH: 1.9 MG/DL
BASOPHILS # BLD: 0 K/UL (ref 0–0.2)
BASOPHILS # BLD: 0 K/UL (ref 0–0.2)
BASOPHILS NFR BLD: 0 % (ref 0–2)
BASOPHILS NFR BLD: 0 % (ref 0–2)
BUN SERPL-MCNC: 7 MG/DL (ref 8–23)
CALCIUM SERPL-MCNC: 8.9 MG/DL (ref 8.3–10.4)
CD34,XCD34T: NORMAL
CHLORIDE SERPL-SCNC: 113 MMOL/L (ref 98–107)
CO2 SERPL-SCNC: 23 MMOL/L (ref 21–32)
COLLECT DURATION TIME UR: 24 HR
CREAT SERPL-MCNC: 0.8 MG/DL (ref 0.6–1)
DIFFERENTIAL METHOD BLD: ABNORMAL
DIFFERENTIAL METHOD BLD: ABNORMAL
EOSINOPHIL # BLD: 0.6 K/UL (ref 0–0.8)
EOSINOPHIL # BLD: 0.9 K/UL (ref 0–0.8)
EOSINOPHIL NFR BLD: 2 % (ref 0.5–7.8)
EOSINOPHIL NFR BLD: 2 % (ref 0.5–7.8)
ERYTHROCYTE [DISTWIDTH] IN BLOOD BY AUTOMATED COUNT: 15.8 % (ref 11.9–14.6)
ERYTHROCYTE [DISTWIDTH] IN BLOOD BY AUTOMATED COUNT: 15.9 % (ref 11.9–14.6)
GAMMA GLOB MFR UR ELPH: 1 MG/DL
GLUCOSE SERPL-MCNC: 144 MG/DL (ref 65–100)
HCT VFR BLD AUTO: 33.4 % (ref 35.8–46.3)
HCT VFR BLD AUTO: 34.9 % (ref 35.8–46.3)
HGB BLD-MCNC: 10.6 G/DL (ref 11.7–15.4)
HGB BLD-MCNC: 11 G/DL (ref 11.7–15.4)
IMM GRANULOCYTES # BLD AUTO: 11.1 K/UL (ref 0–0.5)
IMM GRANULOCYTES # BLD AUTO: 2.6 K/UL (ref 0–0.5)
IMM GRANULOCYTES NFR BLD AUTO: 25 % (ref 0–5)
IMM GRANULOCYTES NFR BLD AUTO: 8 % (ref 0–5)
LYMPHOCYTES # BLD: 1.6 K/UL (ref 0.5–4.6)
LYMPHOCYTES # BLD: 4.4 K/UL (ref 0.5–4.6)
LYMPHOCYTES NFR BLD: 10 % (ref 13–44)
LYMPHOCYTES NFR BLD: 5 % (ref 13–44)
M PROTEIN UR-MCNC: NORMAL MG/DL
MAGNESIUM SERPL-MCNC: 1.9 MG/DL (ref 1.8–2.4)
MCH RBC QN AUTO: 30.9 PG (ref 26.1–32.9)
MCH RBC QN AUTO: 31.1 PG (ref 26.1–32.9)
MCHC RBC AUTO-ENTMCNC: 31.5 G/DL (ref 31.4–35)
MCHC RBC AUTO-ENTMCNC: 31.7 G/DL (ref 31.4–35)
MCV RBC AUTO: 97.9 FL (ref 79.6–97.8)
MCV RBC AUTO: 98 FL (ref 79.6–97.8)
MONOCYTES # BLD: 2.2 K/UL (ref 0.1–1.3)
MONOCYTES # BLD: 4.9 K/UL (ref 0.1–1.3)
MONOCYTES NFR BLD: 11 % (ref 4–12)
MONOCYTES NFR BLD: 7 % (ref 4–12)
NEUTS SEG # BLD: 22.9 K/UL (ref 1.7–8.2)
NEUTS SEG # BLD: 25.1 K/UL (ref 1.7–8.2)
NEUTS SEG NFR BLD: 52 % (ref 43–78)
NEUTS SEG NFR BLD: 78 % (ref 43–78)
NRBC # BLD: 0.03 K/UL (ref 0–0.2)
NRBC # BLD: 0.04 K/UL (ref 0–0.2)
PLATELET # BLD AUTO: 192 K/UL (ref 150–450)
PLATELET # BLD AUTO: 77 K/UL (ref 150–450)
PLATELET COMMENTS,PCOM: ABNORMAL
PLATELET COMMENTS,PCOM: ADEQUATE
PMV BLD AUTO: 10 FL (ref 9.4–12.3)
PMV BLD AUTO: 8.9 FL (ref 9.4–12.3)
POTASSIUM SERPL-SCNC: 3.4 MMOL/L (ref 3.5–5.1)
PROT 24H UR-MRATE: 51 MG/24HR
PROT PATTERN UR ELPH-IMP: NORMAL
PROT UR-MCNC: 6 MG/DL
RBC # BLD AUTO: 3.41 M/UL (ref 4.05–5.25)
RBC # BLD AUTO: 3.56 M/UL (ref 4.05–5.25)
RBC MORPH BLD: ABNORMAL
SODIUM SERPL-SCNC: 144 MMOL/L (ref 136–145)
SPECIMEN VOL ?TM UR: 850 ML
WBC # BLD AUTO: 32.1 K/UL (ref 4.3–11.1)
WBC # BLD AUTO: 44.2 K/UL (ref 4.3–11.1)
WBC MORPH BLD: ABNORMAL
WBC MORPH BLD: ABNORMAL
WEAK D AG RBC QL: NORMAL

## 2020-12-08 PROCEDURE — 85025 COMPLETE CBC W/AUTO DIFF WBC: CPT

## 2020-12-08 PROCEDURE — 80048 BASIC METABOLIC PNL TOTAL CA: CPT

## 2020-12-08 PROCEDURE — 38206 HARVEST AUTO STEM CELLS: CPT

## 2020-12-08 PROCEDURE — 74011250637 HC RX REV CODE- 250/637: Performed by: INTERNAL MEDICINE

## 2020-12-08 PROCEDURE — 86900 BLOOD TYPING SEROLOGIC ABO: CPT

## 2020-12-08 PROCEDURE — 86367 STEM CELLS TOTAL COUNT: CPT

## 2020-12-08 PROCEDURE — 83735 ASSAY OF MAGNESIUM: CPT

## 2020-12-08 PROCEDURE — 96366 THER/PROPH/DIAG IV INF ADDON: CPT

## 2020-12-08 PROCEDURE — 74011250636 HC RX REV CODE- 250/636: Performed by: INTERNAL MEDICINE

## 2020-12-08 PROCEDURE — 74011250637 HC RX REV CODE- 250/637: Performed by: NURSE PRACTITIONER

## 2020-12-08 PROCEDURE — 96365 THER/PROPH/DIAG IV INF INIT: CPT

## 2020-12-08 RX ORDER — POTASSIUM CHLORIDE 29.8 MG/ML
40 INJECTION INTRAVENOUS ONCE
Status: COMPLETED | OUTPATIENT
Start: 2020-12-08 | End: 2020-12-08

## 2020-12-08 RX ORDER — HYDROCODONE BITARTRATE AND ACETAMINOPHEN 5; 325 MG/1; MG/1
1 TABLET ORAL ONCE
Status: COMPLETED | OUTPATIENT
Start: 2020-12-08 | End: 2020-12-08

## 2020-12-08 RX ORDER — SODIUM CHLORIDE 0.9 % (FLUSH) 0.9 %
10-40 SYRINGE (ML) INJECTION AS NEEDED
Status: DISCONTINUED | OUTPATIENT
Start: 2020-12-08 | End: 2020-12-09 | Stop reason: HOSPADM

## 2020-12-08 RX ORDER — SODIUM CHLORIDE 9 MG/ML
1000 INJECTION, SOLUTION INTRAVENOUS CONTINUOUS
Status: ACTIVE | OUTPATIENT
Start: 2020-12-08 | End: 2020-12-08

## 2020-12-08 RX ORDER — CALCIUM CARBONATE 200(500)MG
200 TABLET,CHEWABLE ORAL ONCE
Status: COMPLETED | OUTPATIENT
Start: 2020-12-08 | End: 2020-12-08

## 2020-12-08 RX ADMIN — HYDROCODONE BITARTRATE AND ACETAMINOPHEN 1 TABLET: 5; 325 TABLET ORAL at 09:15

## 2020-12-08 RX ADMIN — POTASSIUM CHLORIDE 40 MEQ: 400 INJECTION, SOLUTION INTRAVENOUS at 09:15

## 2020-12-08 RX ADMIN — SODIUM CHLORIDE 1000 ML: 900 INJECTION, SOLUTION INTRAVENOUS at 08:58

## 2020-12-08 RX ADMIN — TBO-FILGRASTIM 600 MCG: 300 INJECTION, SOLUTION SUBCUTANEOUS at 08:02

## 2020-12-08 RX ADMIN — CALCIUM CARBONATE 200 MG: 500 TABLET, CHEWABLE ORAL at 16:12

## 2020-12-08 RX ADMIN — CALCIUM GLUCONATE 6 G: 98 INJECTION, SOLUTION INTRAVENOUS at 08:58

## 2020-12-08 NOTE — PROGRESS NOTES
Apheresis Day: 1  Dx: Multiple Myeloma  Pre-collection/mobilization form was reviewed prior to initiating collection. Granix was given per policy. Mozobil used: yes (yes/no)  Type of catheter (temp or tunneled): temp  Calcium 6 g used during procedure. CD34: 73  Predictive for 20 liters: 7.2 x 106/kg    30 liters: 10.8 x 106/kg  Pre WBC 44.2  (critical value does not fall outside of expected limits as documented in the medical protocol)  Pre-hgb: 11.0  /  Plt: 192  Post WBC 32.1  Post-hgb: 10.6  /  Plt: 77  Blood/electrolyte replacements: Potassium 40meq  Whole blood processed: 32535  Collection results received at 1740. Patient collected 7.15 x 106/kg for cumulative 7.15 x 106/kg.   Collection goal: 6x 106/kg   Hematocrit in collection bag 2.2  Pt seen by: Lj Terrell NP  Issues: pt c/o hands tingling towards the end of procedure, tums given  Next procedure date:  N/a      Pt requested temp apheresis catheter be removed tonight due to her living so far away and  having to come back tomorrow, cath removed per protocol, pressure dressing in place, no bleeding noted on discharge, instructions given to patient, pt verbalized understanding, monitored pt, VSS discharged home ambulatory      Target CD34+/Kg 6           Kg recipient 61   CD34+ pre-count 73   WB liters to process 15.2

## 2020-12-09 ENCOUNTER — APPOINTMENT (OUTPATIENT)
Dept: INFUSION THERAPY | Age: 60
End: 2020-12-09
Payer: COMMERCIAL

## 2020-12-09 ENCOUNTER — APPOINTMENT (OUTPATIENT)
Dept: INFUSION THERAPY | Age: 60
End: 2020-12-09

## 2020-12-10 ENCOUNTER — HOSPITAL ENCOUNTER (OUTPATIENT)
Dept: INFUSION THERAPY | Age: 60
End: 2020-12-10

## 2020-12-11 ENCOUNTER — APPOINTMENT (OUTPATIENT)
Dept: INFUSION THERAPY | Age: 60
End: 2020-12-11
Payer: COMMERCIAL

## 2020-12-23 ENCOUNTER — HOSPITAL ENCOUNTER (OUTPATIENT)
Dept: ULTRASOUND IMAGING | Age: 60
Discharge: HOME | End: 2020-12-23
Attending: INTERNAL MEDICINE
Payer: COMMERCIAL

## 2020-12-23 DIAGNOSIS — I82.401 DEEP VEIN THROMBOSIS (DVT) OF RIGHT LOWER EXTREMITY, UNSPECIFIED CHRONICITY, UNSPECIFIED VEIN (HCC): ICD-10-CM

## 2020-12-23 PROCEDURE — 93971 EXTREMITY STUDY: CPT

## 2020-12-28 ENCOUNTER — PATIENT OUTREACH (OUTPATIENT)
Dept: CASE MANAGEMENT | Age: 60
End: 2020-12-28

## 2020-12-28 ENCOUNTER — HOSPITAL ENCOUNTER (OUTPATIENT)
Dept: LAB | Age: 60
Discharge: HOME | End: 2020-12-28
Payer: COMMERCIAL

## 2020-12-28 DIAGNOSIS — C90.00 MULTIPLE MYELOMA NOT HAVING ACHIEVED REMISSION (HCC): ICD-10-CM

## 2020-12-28 LAB
ALBUMIN SERPL-MCNC: 3.6 G/DL (ref 3.2–4.6)
ALBUMIN/GLOB SERPL: 1.3 {RATIO} (ref 1.2–3.5)
ALP SERPL-CCNC: 66 U/L (ref 50–136)
ALT SERPL-CCNC: 36 U/L (ref 12–65)
AMORPH CRY URNS QL MICRO: ABNORMAL
ANION GAP SERPL CALC-SCNC: 4 MMOL/L (ref 7–16)
APPEARANCE UR: CLEAR
AST SERPL-CCNC: 21 U/L (ref 15–37)
BACTERIA URNS QL MICRO: 0 /HPF
BASOPHILS # BLD: 0 K/UL (ref 0–0.2)
BASOPHILS NFR BLD: 1 % (ref 0–2)
BILIRUB SERPL-MCNC: 0.2 MG/DL (ref 0.2–1.1)
BILIRUB UR QL: NEGATIVE
BUN SERPL-MCNC: 17 MG/DL (ref 8–23)
CALCIUM SERPL-MCNC: 8.6 MG/DL (ref 8.3–10.4)
CASTS URNS QL MICRO: 0 /LPF
CHLORIDE SERPL-SCNC: 111 MMOL/L (ref 98–107)
CO2 SERPL-SCNC: 28 MMOL/L (ref 21–32)
COLOR UR: YELLOW
CREAT SERPL-MCNC: 0.9 MG/DL (ref 0.6–1)
CRYSTALS URNS QL MICRO: 0 /LPF
DIFFERENTIAL METHOD BLD: ABNORMAL
EOSINOPHIL # BLD: 0.1 K/UL (ref 0–0.8)
EOSINOPHIL NFR BLD: 4 % (ref 0.5–7.8)
EPI CELLS #/AREA URNS HPF: ABNORMAL /HPF
ERYTHROCYTE [DISTWIDTH] IN BLOOD BY AUTOMATED COUNT: 14.8 % (ref 11.9–14.6)
FLUAV AG NPH QL IA: NEGATIVE
FLUBV AG NPH QL IA: NEGATIVE
GLOBULIN SER CALC-MCNC: 2.7 G/DL (ref 2.3–3.5)
GLUCOSE SERPL-MCNC: 81 MG/DL (ref 65–100)
GLUCOSE UR STRIP.AUTO-MCNC: NEGATIVE MG/DL
HCG SERPL QL: NEGATIVE
HCT VFR BLD AUTO: 35.6 % (ref 35.8–46.3)
HGB BLD-MCNC: 11.5 G/DL (ref 11.7–15.4)
HGB UR QL STRIP: ABNORMAL
IMM GRANULOCYTES # BLD AUTO: 0 K/UL (ref 0–0.5)
IMM GRANULOCYTES NFR BLD AUTO: 0 % (ref 0–5)
KETONES UR QL STRIP.AUTO: NEGATIVE MG/DL
LEUKOCYTE ESTERASE UR QL STRIP.AUTO: NEGATIVE
LYMPHOCYTES # BLD: 0.6 K/UL (ref 0.5–4.6)
LYMPHOCYTES NFR BLD: 23 % (ref 13–44)
MAGNESIUM SERPL-MCNC: 2.2 MG/DL (ref 1.8–2.4)
MCH RBC QN AUTO: 31.4 PG (ref 26.1–32.9)
MCHC RBC AUTO-ENTMCNC: 32.3 G/DL (ref 31.4–35)
MCV RBC AUTO: 97.3 FL (ref 79.6–97.8)
MONOCYTES # BLD: 0.4 K/UL (ref 0.1–1.3)
MONOCYTES NFR BLD: 14 % (ref 4–12)
MUCOUS THREADS URNS QL MICRO: ABNORMAL /LPF
NEUTS SEG # BLD: 1.5 K/UL (ref 1.7–8.2)
NEUTS SEG NFR BLD: 58 % (ref 43–78)
NITRITE UR QL STRIP.AUTO: NEGATIVE
NRBC # BLD: 0 K/UL (ref 0–0.2)
PH UR STRIP: 5 [PH] (ref 5–9)
PLATELET # BLD AUTO: 210 K/UL (ref 150–450)
PMV BLD AUTO: 9.6 FL (ref 9.4–12.3)
POTASSIUM SERPL-SCNC: 3.9 MMOL/L (ref 3.5–5.1)
PROT SERPL-MCNC: 6.3 G/DL (ref 6.3–8.2)
PROT UR STRIP-MCNC: NEGATIVE MG/DL
RBC # BLD AUTO: 3.66 M/UL (ref 4.05–5.25)
RBC #/AREA URNS HPF: ABNORMAL /HPF
SODIUM SERPL-SCNC: 143 MMOL/L (ref 136–145)
SP GR UR REFRACTOMETRY: 1.02 (ref 1–1.02)
SPECIMEN SOURCE: NORMAL
UA: UC IF INDICATED,UAUC: ABNORMAL
UROBILINOGEN UR QL STRIP.AUTO: 0.2 EU/DL (ref 0.2–1)
WBC # BLD AUTO: 2.5 K/UL (ref 4.3–11.1)
WBC URNS QL MICRO: ABNORMAL /HPF

## 2020-12-28 PROCEDURE — 36415 COLL VENOUS BLD VENIPUNCTURE: CPT

## 2020-12-28 PROCEDURE — 86334 IMMUNOFIX E-PHORESIS SERUM: CPT

## 2020-12-28 PROCEDURE — 85025 COMPLETE CBC W/AUTO DIFF WBC: CPT

## 2020-12-28 PROCEDURE — 81001 URINALYSIS AUTO W/SCOPE: CPT

## 2020-12-28 PROCEDURE — 80053 COMPREHEN METABOLIC PANEL: CPT

## 2020-12-28 PROCEDURE — 87804 INFLUENZA ASSAY W/OPTIC: CPT

## 2020-12-28 PROCEDURE — 87635 SARS-COV-2 COVID-19 AMP PRB: CPT

## 2020-12-28 PROCEDURE — 82784 ASSAY IGA/IGD/IGG/IGM EACH: CPT

## 2020-12-28 PROCEDURE — 83883 ASSAY NEPHELOMETRY NOT SPEC: CPT

## 2020-12-28 PROCEDURE — 84703 CHORIONIC GONADOTROPIN ASSAY: CPT

## 2020-12-28 PROCEDURE — 83735 ASSAY OF MAGNESIUM: CPT

## 2020-12-28 NOTE — PROGRESS NOTES
12/28/2020- Patient seen in the office today. She is feeling well. Labs are all ok to proceed with Melphalan tomorrow. Patient instructed on importance of cryotherapy and oral hygien post infusion. Patient stated understanding. Consent for CIMBTR and Chemotherapy signed in room with RN as witness. No other needs at this time.

## 2020-12-29 ENCOUNTER — PATIENT OUTREACH (OUTPATIENT)
Dept: CASE MANAGEMENT | Age: 60
End: 2020-12-29

## 2020-12-29 ENCOUNTER — HOSPITAL ENCOUNTER (OUTPATIENT)
Dept: INFUSION THERAPY | Age: 60
Discharge: HOME | End: 2020-12-29
Payer: COMMERCIAL

## 2020-12-29 VITALS
HEART RATE: 74 BPM | TEMPERATURE: 98.7 F | RESPIRATION RATE: 18 BRPM | BODY MASS INDEX: 26.37 KG/M2 | SYSTOLIC BLOOD PRESSURE: 116 MMHG | WEIGHT: 135 LBS | DIASTOLIC BLOOD PRESSURE: 75 MMHG | OXYGEN SATURATION: 99 %

## 2020-12-29 DIAGNOSIS — C90.00 MULTIPLE MYELOMA NOT HAVING ACHIEVED REMISSION (HCC): ICD-10-CM

## 2020-12-29 DIAGNOSIS — C90.00 MULTIPLE MYELOMA NOT HAVING ACHIEVED REMISSION (HCC): Primary | ICD-10-CM

## 2020-12-29 LAB
ALBUMIN SERPL ELPH-MCNC: 3.57 G/DL (ref 3.2–5.6)
ALBUMIN/GLOB SERPL: 1.4 {RATIO}
ALPHA1 GLOB SERPL ELPH-MCNC: 0.29 G/DL (ref 0.1–0.4)
ALPHA2 GLOB SERPL ELPH-MCNC: 0.78 G/DL (ref 0.4–1.2)
B-GLOBULIN SERPL QL ELPH: 1.09 G/DL (ref 0.6–1.3)
COVID-19 RAPID TEST, COVR: NOT DETECTED
GAMMA GLOB MFR SERPL ELPH: 0.47 G/DL (ref 0.5–1.6)
IGA SERPL-MCNC: 28 MG/DL (ref 85–499)
IGG SERPL-MCNC: 456 MG/DL (ref 610–1616)
IGM SERPL-MCNC: 17 MG/DL (ref 35–242)
KAPPA LC FREE SER-MCNC: 6.75 MG/L (ref 3.3–19.4)
KAPPA LC FREE/LAMBDA FREE SER: 0.11 {RATIO} (ref 0.26–1.65)
LAMBDA LC FREE SERPL-MCNC: 63.35 MG/L (ref 5.71–26.3)
M PROTEIN SERPL ELPH-MCNC: ABNORMAL G/DL
PROT PATTERN SERPL ELPH-IMP: ABNORMAL
PROT PATTERN SPEC IFE-IMP: ABNORMAL
PROT SERPL-MCNC: 6.2 G/DL (ref 6.3–8.2)
SARS COV-2, XPGCVT: NEGATIVE
SOURCE, COVRS: NORMAL

## 2020-12-29 PROCEDURE — 74011000258 HC RX REV CODE- 258: Performed by: INTERNAL MEDICINE

## 2020-12-29 PROCEDURE — 96367 TX/PROPH/DG ADDL SEQ IV INF: CPT

## 2020-12-29 PROCEDURE — 96375 TX/PRO/DX INJ NEW DRUG ADDON: CPT

## 2020-12-29 PROCEDURE — 74011250636 HC RX REV CODE- 250/636: Performed by: INTERNAL MEDICINE

## 2020-12-29 PROCEDURE — 96413 CHEMO IV INFUSION 1 HR: CPT

## 2020-12-29 RX ORDER — DIPHENHYDRAMINE HYDROCHLORIDE 50 MG/ML
25 INJECTION, SOLUTION INTRAMUSCULAR; INTRAVENOUS ONCE
Status: CANCELLED | OUTPATIENT
Start: 2020-12-30 | End: 2020-12-30

## 2020-12-29 RX ORDER — SODIUM CHLORIDE 0.9 % (FLUSH) 0.9 %
10 SYRINGE (ML) INJECTION AS NEEDED
Status: CANCELLED | OUTPATIENT
Start: 2020-12-30

## 2020-12-29 RX ORDER — DEXAMETHASONE SODIUM PHOSPHATE 100 MG/10ML
10 INJECTION INTRAMUSCULAR; INTRAVENOUS ONCE
Status: COMPLETED | OUTPATIENT
Start: 2020-12-29 | End: 2020-12-29

## 2020-12-29 RX ORDER — LORAZEPAM 2 MG/ML
0.5 INJECTION INTRAMUSCULAR ONCE
Status: CANCELLED | OUTPATIENT
Start: 2020-12-29 | End: 2020-12-29

## 2020-12-29 RX ORDER — SODIUM CHLORIDE 0.9 % (FLUSH) 0.9 %
10 SYRINGE (ML) INJECTION AS NEEDED
Status: ACTIVE | OUTPATIENT
Start: 2020-12-29 | End: 2020-12-29

## 2020-12-29 RX ORDER — ONDANSETRON 2 MG/ML
8 INJECTION INTRAMUSCULAR; INTRAVENOUS ONCE
Status: COMPLETED | OUTPATIENT
Start: 2020-12-29 | End: 2020-12-29

## 2020-12-29 RX ORDER — ACETAMINOPHEN 325 MG/1
650 TABLET ORAL ONCE
Status: CANCELLED | OUTPATIENT
Start: 2020-12-30 | End: 2020-12-30

## 2020-12-29 RX ORDER — SODIUM CHLORIDE 9 MG/ML
25 INJECTION, SOLUTION INTRAVENOUS CONTINUOUS
Status: ACTIVE | OUTPATIENT
Start: 2020-12-29 | End: 2020-12-29

## 2020-12-29 RX ADMIN — SODIUM CHLORIDE 150 MG: 900 INJECTION, SOLUTION INTRAVENOUS at 08:38

## 2020-12-29 RX ADMIN — DEXAMETHASONE SODIUM PHOSPHATE 10 MG: 10 INJECTION INTRAMUSCULAR; INTRAVENOUS at 08:19

## 2020-12-29 RX ADMIN — Medication 288 MG: at 09:23

## 2020-12-29 RX ADMIN — SODIUM CHLORIDE 25 ML/HR: 9 INJECTION, SOLUTION INTRAVENOUS at 08:10

## 2020-12-29 RX ADMIN — Medication 10 ML: at 08:10

## 2020-12-29 RX ADMIN — ONDANSETRON 8 MG: 2 INJECTION INTRAMUSCULAR; INTRAVENOUS at 08:17

## 2020-12-29 NOTE — PROGRESS NOTES
12/29/20- Pharmacy called pt allergic to penicillin, unable to take Erythromycin. MD ok with omitting and will just do Levaquin only.

## 2020-12-29 NOTE — PROGRESS NOTES
Diagnosis: multiple myeloma  Transplant Date: 12/30/20  Day: (+/-) -1  Chemo regimen used: mephalan  Labs needed at next visit: see orders and in sign and held  Labs not resulted that need follow-up: none  Next Appointment scheduled: 12/30/20 at 0900. BMT assessments and toxicities completed. WBC/ANC= 2.5/1.5  Prophylactic medications started day +1  Granix started on Day +6 and last dose given on when ANC 1500. Toxicities greater than 2 :none  Bactrim or equivalent initiated on Day +30. Rationale for delayed initiation none    Patient seen by MD/NP Dr. Mayra Humphries until engraftment. New orders received: none  Issues:none    Pt arrived ambulatory to [facility]. Port accessed with good blood return. NS infusing. Pre meds given as ordered. Cryo therapy initiated. Melphalan infusing. Pt monitored. Aware of next appt tomorrow at 0900. Discharged ambulatory.

## 2020-12-30 ENCOUNTER — HOSPITAL ENCOUNTER (OUTPATIENT)
Dept: INFUSION THERAPY | Age: 60
Discharge: HOME | End: 2020-12-30
Payer: COMMERCIAL

## 2020-12-30 VITALS
DIASTOLIC BLOOD PRESSURE: 68 MMHG | BODY MASS INDEX: 26.6 KG/M2 | TEMPERATURE: 97.7 F | SYSTOLIC BLOOD PRESSURE: 122 MMHG | WEIGHT: 136.2 LBS | RESPIRATION RATE: 18 BRPM | HEART RATE: 71 BPM | OXYGEN SATURATION: 100 %

## 2020-12-30 LAB
ALBUMIN SERPL-MCNC: 3.3 G/DL (ref 3.2–4.6)
ALBUMIN/GLOB SERPL: 1.2 {RATIO} (ref 1.2–3.5)
ALP SERPL-CCNC: 56 U/L (ref 50–136)
ALT SERPL-CCNC: 33 U/L (ref 12–65)
ANION GAP SERPL CALC-SCNC: 7 MMOL/L (ref 7–16)
AST SERPL-CCNC: 15 U/L (ref 15–37)
BASOPHILS # BLD: 0 K/UL (ref 0–0.2)
BASOPHILS NFR BLD: 0 % (ref 0–2)
BILIRUB SERPL-MCNC: 0.3 MG/DL (ref 0.2–1.1)
BUN SERPL-MCNC: 16 MG/DL (ref 8–23)
CALCIUM SERPL-MCNC: 8.5 MG/DL (ref 8.3–10.4)
CHLORIDE SERPL-SCNC: 111 MMOL/L (ref 98–107)
CO2 SERPL-SCNC: 23 MMOL/L (ref 21–32)
CREAT SERPL-MCNC: 0.8 MG/DL (ref 0.6–1)
DIFFERENTIAL METHOD BLD: ABNORMAL
EOSINOPHIL # BLD: 0.2 K/UL (ref 0–0.8)
EOSINOPHIL NFR BLD: 3 % (ref 0.5–7.8)
ERYTHROCYTE [DISTWIDTH] IN BLOOD BY AUTOMATED COUNT: 14.7 % (ref 11.9–14.6)
GGT SERPL-CCNC: 29 U/L (ref 5–55)
GLOBULIN SER CALC-MCNC: 2.7 G/DL (ref 2.3–3.5)
GLUCOSE SERPL-MCNC: 133 MG/DL (ref 65–100)
HCT VFR BLD AUTO: 33.9 % (ref 35.8–46.3)
HGB BLD-MCNC: 11.1 G/DL (ref 11.7–15.4)
IMM GRANULOCYTES # BLD AUTO: 0 K/UL (ref 0–0.5)
IMM GRANULOCYTES NFR BLD AUTO: 0 % (ref 0–5)
LDH SERPL L TO P-CCNC: 141 U/L (ref 110–210)
LYMPHOCYTES # BLD: 0.2 K/UL (ref 0.5–4.6)
LYMPHOCYTES NFR BLD: 4 % (ref 13–44)
MAGNESIUM SERPL-MCNC: 2.2 MG/DL (ref 1.8–2.4)
MCH RBC QN AUTO: 31.4 PG (ref 26.1–32.9)
MCHC RBC AUTO-ENTMCNC: 32.7 G/DL (ref 31.4–35)
MCV RBC AUTO: 96 FL (ref 79.6–97.8)
MONOCYTES # BLD: 0.4 K/UL (ref 0.1–1.3)
MONOCYTES NFR BLD: 6 % (ref 4–12)
NEUTS SEG # BLD: 5 K/UL (ref 1.7–8.2)
NEUTS SEG NFR BLD: 87 % (ref 43–78)
NRBC # BLD: 0 K/UL (ref 0–0.2)
PHOSPHATE SERPL-MCNC: 3.2 MG/DL (ref 2.3–3.7)
PLATELET # BLD AUTO: 210 K/UL (ref 150–450)
PMV BLD AUTO: 9.5 FL (ref 9.4–12.3)
POTASSIUM SERPL-SCNC: 3.9 MMOL/L (ref 3.5–5.1)
PROT SERPL-MCNC: 6 G/DL (ref 6.3–8.2)
RBC # BLD AUTO: 3.53 M/UL (ref 4.05–5.25)
SODIUM SERPL-SCNC: 141 MMOL/L (ref 136–145)
WBC # BLD AUTO: 5.8 K/UL (ref 4.3–11.1)

## 2020-12-30 PROCEDURE — 84100 ASSAY OF PHOSPHORUS: CPT

## 2020-12-30 PROCEDURE — 96375 TX/PRO/DX INJ NEW DRUG ADDON: CPT

## 2020-12-30 PROCEDURE — 83615 LACTATE (LD) (LDH) ENZYME: CPT

## 2020-12-30 PROCEDURE — 96361 HYDRATE IV INFUSION ADD-ON: CPT

## 2020-12-30 PROCEDURE — 74011250636 HC RX REV CODE- 250/636: Performed by: INTERNAL MEDICINE

## 2020-12-30 PROCEDURE — 83735 ASSAY OF MAGNESIUM: CPT

## 2020-12-30 PROCEDURE — 38241 TRANSPLT AUTOL HCT/DONOR: CPT

## 2020-12-30 PROCEDURE — 80053 COMPREHEN METABOLIC PANEL: CPT

## 2020-12-30 PROCEDURE — 38208 THAW PRESERVED STEM CELLS: CPT

## 2020-12-30 PROCEDURE — 74011250637 HC RX REV CODE- 250/637: Performed by: INTERNAL MEDICINE

## 2020-12-30 PROCEDURE — 82977 ASSAY OF GGT: CPT

## 2020-12-30 PROCEDURE — 96374 THER/PROPH/DIAG INJ IV PUSH: CPT

## 2020-12-30 PROCEDURE — 85025 COMPLETE CBC W/AUTO DIFF WBC: CPT

## 2020-12-30 RX ORDER — DIPHENHYDRAMINE HYDROCHLORIDE 50 MG/ML
25 INJECTION, SOLUTION INTRAMUSCULAR; INTRAVENOUS ONCE
Status: COMPLETED | OUTPATIENT
Start: 2020-12-30 | End: 2020-12-30

## 2020-12-30 RX ORDER — LORAZEPAM 2 MG/ML
0.5 INJECTION INTRAMUSCULAR ONCE
Status: COMPLETED | OUTPATIENT
Start: 2020-12-30 | End: 2020-12-30

## 2020-12-30 RX ORDER — SODIUM CHLORIDE 0.9 % (FLUSH) 0.9 %
10 SYRINGE (ML) INJECTION AS NEEDED
Status: DISCONTINUED | OUTPATIENT
Start: 2020-12-30 | End: 2021-01-01 | Stop reason: HOSPADM

## 2020-12-30 RX ORDER — ACETAMINOPHEN 325 MG/1
650 TABLET ORAL ONCE
Status: COMPLETED | OUTPATIENT
Start: 2020-12-30 | End: 2020-12-30

## 2020-12-30 RX ORDER — SODIUM CHLORIDE 0.9 % (FLUSH) 0.9 %
10-40 SYRINGE (ML) INJECTION AS NEEDED
Status: ACTIVE | OUTPATIENT
Start: 2020-12-30 | End: 2020-12-30

## 2020-12-30 RX ADMIN — DIPHENHYDRAMINE HYDROCHLORIDE 25 MG: 50 INJECTION, SOLUTION INTRAMUSCULAR; INTRAVENOUS at 09:48

## 2020-12-30 RX ADMIN — SODIUM CHLORIDE 1000 ML: 900 INJECTION, SOLUTION INTRAVENOUS at 09:30

## 2020-12-30 RX ADMIN — Medication 10 ML: at 11:50

## 2020-12-30 RX ADMIN — LORAZEPAM 0.5 MG: 2 INJECTION INTRAMUSCULAR; INTRAVENOUS at 09:50

## 2020-12-30 RX ADMIN — ACETAMINOPHEN 650 MG: 325 TABLET, FILM COATED ORAL at 09:47

## 2020-12-30 NOTE — PROGRESS NOTES
Patient is day 0 for autologous stem cell transplant. Prior to reinfusion of stem cells, patient identifiers including name,  verified with patient. Medical record number reviewed with both nurses. Arm band with this information was placed on patient. Education regarding reinfusion done with patient and family member. Education included potential side effects from reinfusion, symptoms to report during reinfusion as well as process for reinfusion. Patient/family allowed time for questions, all questions answered. Patient was premedication with Tylenol, Benadryl 25 mg IV & Ativan 0.5mg IV. Stem cells reinfused per policy and as ordered. The following reactions/side effects were noted and reported: None. Vital signs throughout reinfusion are documented in the VS flowsheet. Pt was monitored 1 hour post reinfusion and was d/cd with home IVF as per order. Arrived to the [facility]. Transplant completed. Patient tolerated well. Any issues or concerns during appointment: None. Patient aware of next infusion appointment on  (date) at 0800 (time). Discharged via wheelchair in stable condition.

## 2020-12-31 ENCOUNTER — PATIENT OUTREACH (OUTPATIENT)
Dept: CASE MANAGEMENT | Age: 60
End: 2020-12-31

## 2020-12-31 ENCOUNTER — HOSPITAL ENCOUNTER (OUTPATIENT)
Dept: INFUSION THERAPY | Age: 60
Discharge: HOME | End: 2020-12-31
Payer: COMMERCIAL

## 2020-12-31 VITALS
BODY MASS INDEX: 27.46 KG/M2 | DIASTOLIC BLOOD PRESSURE: 71 MMHG | TEMPERATURE: 98.6 F | WEIGHT: 140.6 LBS | SYSTOLIC BLOOD PRESSURE: 140 MMHG | HEART RATE: 78 BPM | RESPIRATION RATE: 18 BRPM

## 2020-12-31 DIAGNOSIS — C90.00 MULTIPLE MYELOMA NOT HAVING ACHIEVED REMISSION (HCC): Primary | ICD-10-CM

## 2020-12-31 LAB
ANION GAP SERPL CALC-SCNC: 9 MMOL/L (ref 7–16)
BASOPHILS # BLD: 0 K/UL (ref 0–0.2)
BASOPHILS NFR BLD: 0 % (ref 0–2)
BUN SERPL-MCNC: 13 MG/DL (ref 8–23)
CALCIUM SERPL-MCNC: 8.3 MG/DL (ref 8.3–10.4)
CHLORIDE SERPL-SCNC: 112 MMOL/L (ref 98–107)
CO2 SERPL-SCNC: 23 MMOL/L (ref 21–32)
CREAT SERPL-MCNC: 0.7 MG/DL (ref 0.6–1)
DIFFERENTIAL METHOD BLD: ABNORMAL
EOSINOPHIL # BLD: 0 K/UL (ref 0–0.8)
EOSINOPHIL NFR BLD: 0 % (ref 0.5–7.8)
ERYTHROCYTE [DISTWIDTH] IN BLOOD BY AUTOMATED COUNT: 15.1 % (ref 11.9–14.6)
GLUCOSE SERPL-MCNC: 90 MG/DL (ref 65–100)
HCT VFR BLD AUTO: 31.8 % (ref 35.8–46.3)
HGB BLD-MCNC: 10.1 G/DL (ref 11.7–15.4)
IMM GRANULOCYTES # BLD AUTO: 0 K/UL (ref 0–0.5)
IMM GRANULOCYTES NFR BLD AUTO: 0 % (ref 0–5)
LYMPHOCYTES # BLD: 0.4 K/UL (ref 0.5–4.6)
LYMPHOCYTES NFR BLD: 7 % (ref 13–44)
MCH RBC QN AUTO: 31 PG (ref 26.1–32.9)
MCHC RBC AUTO-ENTMCNC: 31.8 G/DL (ref 31.4–35)
MCV RBC AUTO: 97.5 FL (ref 79.6–97.8)
MONOCYTES # BLD: 0.2 K/UL (ref 0.1–1.3)
MONOCYTES NFR BLD: 4 % (ref 4–12)
NEUTS SEG # BLD: 4.6 K/UL (ref 1.7–8.2)
NEUTS SEG NFR BLD: 89 % (ref 43–78)
NRBC # BLD: 0 K/UL (ref 0–0.2)
PLATELET # BLD AUTO: 181 K/UL (ref 150–450)
PMV BLD AUTO: 10.1 FL (ref 9.4–12.3)
POTASSIUM SERPL-SCNC: 3.7 MMOL/L (ref 3.5–5.1)
RBC # BLD AUTO: 3.26 M/UL (ref 4.05–5.25)
SODIUM SERPL-SCNC: 144 MMOL/L (ref 136–145)
WBC # BLD AUTO: 5.2 K/UL (ref 4.3–11.1)

## 2020-12-31 PROCEDURE — 80048 BASIC METABOLIC PNL TOTAL CA: CPT

## 2020-12-31 PROCEDURE — 74011250636 HC RX REV CODE- 250/636: Performed by: INTERNAL MEDICINE

## 2020-12-31 PROCEDURE — 96361 HYDRATE IV INFUSION ADD-ON: CPT

## 2020-12-31 PROCEDURE — 96374 THER/PROPH/DIAG INJ IV PUSH: CPT

## 2020-12-31 PROCEDURE — 74011000258 HC RX REV CODE- 258: Performed by: INTERNAL MEDICINE

## 2020-12-31 PROCEDURE — 96375 TX/PRO/DX INJ NEW DRUG ADDON: CPT

## 2020-12-31 PROCEDURE — 85025 COMPLETE CBC W/AUTO DIFF WBC: CPT

## 2020-12-31 RX ORDER — SODIUM CHLORIDE 0.9 % (FLUSH) 0.9 %
10-40 SYRINGE (ML) INJECTION AS NEEDED
Status: ACTIVE | OUTPATIENT
Start: 2020-12-31 | End: 2020-12-31

## 2020-12-31 RX ORDER — FAMOTIDINE 10 MG/ML
40 INJECTION INTRAVENOUS DAILY
Status: CANCELLED
Start: 2020-12-31

## 2020-12-31 RX ORDER — DEXTROSE, SODIUM CHLORIDE, AND POTASSIUM CHLORIDE 5; .45; .15 G/100ML; G/100ML; G/100ML
1000 INJECTION INTRAVENOUS
Status: COMPLETED | OUTPATIENT
Start: 2020-12-31 | End: 2020-12-31

## 2020-12-31 RX ORDER — FAMOTIDINE 10 MG/ML
40 INJECTION INTRAVENOUS DAILY
Status: CANCELLED
Start: 2021-01-06

## 2020-12-31 RX ORDER — FAMOTIDINE 10 MG/ML
40 INJECTION INTRAVENOUS DAILY
Status: CANCELLED
Start: 2021-01-01

## 2020-12-31 RX ORDER — FAMOTIDINE 10 MG/ML
40 INJECTION INTRAVENOUS DAILY
Status: CANCELLED
Start: 2021-01-05

## 2020-12-31 RX ORDER — FAMOTIDINE 10 MG/ML
40 INJECTION INTRAVENOUS DAILY
Status: DISCONTINUED | OUTPATIENT
Start: 2020-12-31 | End: 2020-12-31

## 2020-12-31 RX ORDER — ONDANSETRON 2 MG/ML
8 INJECTION INTRAMUSCULAR; INTRAVENOUS
Status: COMPLETED | OUTPATIENT
Start: 2020-12-31 | End: 2020-12-31

## 2020-12-31 RX ORDER — FAMOTIDINE 10 MG/ML
40 INJECTION INTRAVENOUS DAILY
Status: CANCELLED
Start: 2021-01-03

## 2020-12-31 RX ORDER — FAMOTIDINE 10 MG/ML
40 INJECTION INTRAVENOUS DAILY
Status: CANCELLED
Start: 2021-01-04

## 2020-12-31 RX ORDER — SODIUM CHLORIDE 0.9 % (FLUSH) 0.9 %
10 SYRINGE (ML) INJECTION EVERY 8 HOURS
Status: DISCONTINUED | OUTPATIENT
Start: 2020-12-31 | End: 2020-12-31

## 2020-12-31 RX ORDER — FAMOTIDINE 10 MG/ML
40 INJECTION INTRAVENOUS DAILY
Status: CANCELLED
Start: 2021-01-02

## 2020-12-31 RX ADMIN — ONDANSETRON 8 MG: 2 INJECTION INTRAMUSCULAR; INTRAVENOUS at 08:18

## 2020-12-31 RX ADMIN — FAMOTIDINE: 10 INJECTION INTRAVENOUS at 10:44

## 2020-12-31 RX ADMIN — Medication 10 ML: at 11:00

## 2020-12-31 RX ADMIN — DEXTROSE, SODIUM CHLORIDE, AND POTASSIUM CHLORIDE 1000 ML: 5; .45; .15 INJECTION INTRAVENOUS at 08:20

## 2020-12-31 NOTE — PROGRESS NOTES
Diagnosis: Multiple Myeloma  Transplant Date: 12/30/2020  Day: (+/-) Day +1  Chemo regimen used: Melphalan   Labs needed at next visit: CBC with Differential, CMP, GGT, LDH, Magnesium and Phosphorus. Labs not resulted that need follow-up: none  Next Appointment scheduled: 1/1/2021 at 8am.  BMT assessments and toxicities completed. WBC/ANC= 5.2/4.6. Prophylactic medications started Day +1  Granix not started. Will start Monday, 1/4/2021. Toxicities greater than 2 :none  Bactrim or equivalent not initiated yet  Patient seen by MD: Dr. Katie Choudhury until engraftment. New orders received: Pepcid 40mg IV daily  Issues:Hiccups and Indigestion (Meds ordered).

## 2020-12-31 NOTE — PROGRESS NOTES
12/31: Patient seen in infusion with Dr. Lucy Diaz. Patient is Dy+1 s/p Auto PBHCT. She will begin her anti-microbials today. Patient is doing okay, c/o nausea, indigestion and hiccups. MD wants patient to receive Pepcid 40mg IV daily for 7 days. Patient aware if she has a fever, she needs to start the antibiotics and go to the ED. She will start granix on Day +6 which is 1/5/2021. Patient will return to infusion every day over the weekend to have labs checked and receive any replacements that might be needed.

## 2021-01-01 ENCOUNTER — HOSPITAL ENCOUNTER (OUTPATIENT)
Dept: INFUSION THERAPY | Age: 61
Discharge: HOME | End: 2021-01-01
Payer: COMMERCIAL

## 2021-01-01 VITALS
OXYGEN SATURATION: 98 % | SYSTOLIC BLOOD PRESSURE: 128 MMHG | TEMPERATURE: 97.6 F | RESPIRATION RATE: 16 BRPM | HEART RATE: 108 BPM | DIASTOLIC BLOOD PRESSURE: 77 MMHG

## 2021-01-01 DIAGNOSIS — C90.00 MULTIPLE MYELOMA NOT HAVING ACHIEVED REMISSION (HCC): Primary | ICD-10-CM

## 2021-01-01 LAB
ALBUMIN SERPL-MCNC: 3.3 G/DL (ref 3.2–4.6)
ALBUMIN/GLOB SERPL: 1.1 {RATIO} (ref 1.2–3.5)
ALP SERPL-CCNC: 59 U/L (ref 50–136)
ALT SERPL-CCNC: 29 U/L (ref 12–65)
ANION GAP SERPL CALC-SCNC: 6 MMOL/L (ref 7–16)
AST SERPL-CCNC: 11 U/L (ref 15–37)
BASOPHILS # BLD: 0 K/UL (ref 0–0.2)
BASOPHILS NFR BLD: 0 % (ref 0–2)
BILIRUB SERPL-MCNC: 0.4 MG/DL (ref 0.2–1.1)
BUN SERPL-MCNC: 13 MG/DL (ref 8–23)
CALCIUM SERPL-MCNC: 9.1 MG/DL (ref 8.3–10.4)
CHLORIDE SERPL-SCNC: 108 MMOL/L (ref 98–107)
CO2 SERPL-SCNC: 26 MMOL/L (ref 21–32)
CREAT SERPL-MCNC: 0.7 MG/DL (ref 0.6–1)
DIFFERENTIAL METHOD BLD: ABNORMAL
EOSINOPHIL # BLD: 0.1 K/UL (ref 0–0.8)
EOSINOPHIL NFR BLD: 1 % (ref 0.5–7.8)
ERYTHROCYTE [DISTWIDTH] IN BLOOD BY AUTOMATED COUNT: 15 % (ref 11.9–14.6)
GGT SERPL-CCNC: 32 U/L (ref 5–55)
GLOBULIN SER CALC-MCNC: 2.9 G/DL (ref 2.3–3.5)
GLUCOSE SERPL-MCNC: 110 MG/DL (ref 65–100)
HCT VFR BLD AUTO: 35.6 % (ref 35.8–46.3)
HGB BLD-MCNC: 11.4 G/DL (ref 11.7–15.4)
IMM GRANULOCYTES # BLD AUTO: 0 K/UL (ref 0–0.5)
IMM GRANULOCYTES NFR BLD AUTO: 1 % (ref 0–5)
LDH SERPL L TO P-CCNC: 218 U/L (ref 110–210)
LYMPHOCYTES # BLD: 0.2 K/UL (ref 0.5–4.6)
LYMPHOCYTES NFR BLD: 3 % (ref 13–44)
MAGNESIUM SERPL-MCNC: 2 MG/DL (ref 1.8–2.4)
MCH RBC QN AUTO: 31.1 PG (ref 26.1–32.9)
MCHC RBC AUTO-ENTMCNC: 32 G/DL (ref 31.4–35)
MCV RBC AUTO: 97.3 FL (ref 79.6–97.8)
MONOCYTES # BLD: 0 K/UL (ref 0.1–1.3)
MONOCYTES NFR BLD: 0 % (ref 4–12)
NEUTS SEG # BLD: 5.7 K/UL (ref 1.7–8.2)
NEUTS SEG NFR BLD: 95 % (ref 43–78)
NRBC # BLD: 0 K/UL (ref 0–0.2)
PHOSPHATE SERPL-MCNC: 3.9 MG/DL (ref 2.3–3.7)
PLATELET # BLD AUTO: 181 K/UL (ref 150–450)
PMV BLD AUTO: 9.7 FL (ref 9.4–12.3)
POTASSIUM SERPL-SCNC: 3.6 MMOL/L (ref 3.5–5.1)
PROT SERPL-MCNC: 6.2 G/DL (ref 6.3–8.2)
RBC # BLD AUTO: 3.66 M/UL (ref 4.05–5.25)
SODIUM SERPL-SCNC: 140 MMOL/L (ref 136–145)
WBC # BLD AUTO: 6 K/UL (ref 4.3–11.1)

## 2021-01-01 PROCEDURE — 80053 COMPREHEN METABOLIC PANEL: CPT

## 2021-01-01 PROCEDURE — 96375 TX/PRO/DX INJ NEW DRUG ADDON: CPT

## 2021-01-01 PROCEDURE — 96374 THER/PROPH/DIAG INJ IV PUSH: CPT

## 2021-01-01 PROCEDURE — 85025 COMPLETE CBC W/AUTO DIFF WBC: CPT

## 2021-01-01 PROCEDURE — 74011000258 HC RX REV CODE- 258: Performed by: INTERNAL MEDICINE

## 2021-01-01 PROCEDURE — 96361 HYDRATE IV INFUSION ADD-ON: CPT

## 2021-01-01 PROCEDURE — 82977 ASSAY OF GGT: CPT

## 2021-01-01 PROCEDURE — 83735 ASSAY OF MAGNESIUM: CPT

## 2021-01-01 PROCEDURE — 84100 ASSAY OF PHOSPHORUS: CPT

## 2021-01-01 PROCEDURE — 83615 LACTATE (LD) (LDH) ENZYME: CPT

## 2021-01-01 PROCEDURE — 99214 OFFICE O/P EST MOD 30 MIN: CPT | Performed by: INTERNAL MEDICINE

## 2021-01-01 PROCEDURE — 74011250636 HC RX REV CODE- 250/636: Performed by: INTERNAL MEDICINE

## 2021-01-01 RX ORDER — ONDANSETRON 2 MG/ML
8 INJECTION INTRAMUSCULAR; INTRAVENOUS
Status: COMPLETED | OUTPATIENT
Start: 2021-01-01 | End: 2021-01-01

## 2021-01-01 RX ORDER — DEXTROSE, SODIUM CHLORIDE, AND POTASSIUM CHLORIDE 5; .45; .15 G/100ML; G/100ML; G/100ML
1000 INJECTION INTRAVENOUS
Status: COMPLETED | OUTPATIENT
Start: 2021-01-01 | End: 2021-01-01

## 2021-01-01 RX ORDER — SODIUM CHLORIDE 0.9 % (FLUSH) 0.9 %
10 SYRINGE (ML) INJECTION EVERY 8 HOURS
Status: DISCONTINUED | OUTPATIENT
Start: 2021-01-01 | End: 2021-01-03 | Stop reason: HOSPADM

## 2021-01-01 RX ORDER — SODIUM CHLORIDE 0.9 % (FLUSH) 0.9 %
10-30 SYRINGE (ML) INJECTION AS NEEDED
Status: CANCELLED | OUTPATIENT
Start: 2021-01-01

## 2021-01-01 RX ORDER — FAMOTIDINE 10 MG/ML
40 INJECTION INTRAVENOUS DAILY
Status: DISCONTINUED | OUTPATIENT
Start: 2021-01-01 | End: 2021-01-01 | Stop reason: CLARIF

## 2021-01-01 RX ADMIN — DEXTROSE, SODIUM CHLORIDE, AND POTASSIUM CHLORIDE 1000 ML: 5; .45; .15 INJECTION INTRAVENOUS at 10:18

## 2021-01-01 RX ADMIN — Medication 10 ML: at 12:55

## 2021-01-01 RX ADMIN — Medication 10 ML: at 10:10

## 2021-01-01 RX ADMIN — FAMOTIDINE: 10 INJECTION INTRAVENOUS at 10:32

## 2021-01-01 RX ADMIN — ONDANSETRON 8 MG: 2 INJECTION INTRAMUSCULAR; INTRAVENOUS at 10:16

## 2021-01-01 NOTE — PROGRESS NOTES
Diagnosis: Multiple Myeloma  Transplant Date: 12/30/2020  Day: (+/-) Day +2  Chemo regimen used: Melphalan   Labs needed at next visit: CBC with Differential, BMP  Labs not resulted that need follow-up: none  Next Appointment scheduled: 1/2/2021 at 10am.  BMT assessments and toxicities completed. WBC/ANC=6.0/5.7 . Prophylactic medications started Day +1 (12/31/2020)  Granix not started. Day +6. Toxicities greater than 2 :none  Bactrim or equivalent not initiated yet  Patient seen by Dr. Medhat Rodriguez at chairside. New orders received: None  Issues: Pt still with hiccups, indigestion and nausea (prn Zofran administered during visit). Per Dr. Medhat Rodriguez, pt aware she is to take PRN antiemetics more frequently to help prevent nausea. . Pt also encouraged to eat small frequent meals. 40 mg of IV Pepcid administered as ordered. Pt discharged ambulatory with spouse.

## 2021-01-01 NOTE — PROGRESS NOTES
[facility] Hematology and Oncology: Office Visit Established Patient    Chief Complaint:    No chief complaint on file. History of Present Illness:  Ms. Lucinda Traylor is a 56 y.o. female who returns today for management of multiple myeloma and autologous stem cell transplant. In 6/2020 she was diagnosed with Multiple Myeloma, hypo-secretory Lambda light chain, 1q+, del. 13q and t( 11;14 ), ISS Stage 2; at diagnosis her Lambda level was 246. She has received 4 cycles of RVd and achieved a NC, her Lambda level decreased to 46, her extensive Hypercoagulable work-up revealed compound heterozygosity for the MTHFR mutation without Hyperhomocysteinemia. She underwent an Autologous PBHCT on 12/30/2020. Here for infusion visit, D+2 autoSCT. Doing OK. She is having more nausea, this is keeping her from eating very much. No diarrhea. No abdominal pain. Appetite is poor. Fatigued. No fevers. Review of Systems:  Constitutional: Positive for fatigue. HENT: Negative. Eyes: Negative. Respiratory: Negative. Cardiovascular: Negative. Gastrointestinal: Positive for nausea. Genitourinary: Negative. Musculoskeletal: Negative. Skin: Negative. Neurological: Negative. Endo/Heme/Allergies: Negative. Psychiatric/Behavioral: Negative. All other systems reviewed and are negative.      Allergies   Allergen Reactions    Erythromycin Nausea Only    Penicillins Rash     Past Medical History:   Diagnosis Date    DVT (deep venous thrombosis) (HCC)     Essential hypertension 3/29/2018    GERD (gastroesophageal reflux disease) 3/29/2018    Hyperlipemia     Hyperlipidemia 3/29/2018    Hypertension     Left hip pain 3/29/2018    Multiple myeloma ([address] 06/2020     Past Surgical History:   Procedure Laterality Date    HX OTHER SURGICAL      open chest surgey    HX TUBAL LIGATION      IR INSERT NON TUNL CVC OVER 5 YRS  12/7/2020    IR INSERT TUNL CVC W PORT OVER 5 YEARS  11/17/2020     Family History Problem Relation Age of Onset    Breast Cancer Neg Hx     Colon Cancer Neg Hx     Ovarian Cancer Neg Hx      Social History     Socioeconomic History    Marital status:      Spouse name: Not on file    Number of children: Not on file    Years of education: Not on file    Highest education level: Not on file   Occupational History    Not on file   Social Needs    Financial resource strain: Not on file    Food insecurity     Worry: Not on file     Inability: Not on file    Transportation needs     Medical: Not on file     Non-medical: Not on file   Tobacco Use    Smoking status: Never Smoker    Smokeless tobacco: Never Used   Substance and Sexual Activity    Alcohol use: No    Drug use: No    Sexual activity: Yes     Partners: Male     Birth control/protection: Surgical     Comment: tubal   Lifestyle    Physical activity     Days per week: Not on file     Minutes per session: Not on file    Stress: Not on file   Relationships    Social connections     Talks on phone: Not on file     Gets together: Not on file     Attends Confucianist service: Not on file     Active member of club or organization: Not on file     Attends meetings of clubs or organizations: Not on file     Relationship status: Not on file    Intimate partner violence     Fear of current or ex partner: Not on file     Emotionally abused: Not on file     Physically abused: Not on file     Forced sexual activity: Not on file   Other Topics Concern    Not on file   Social History Narrative    Not on file     Current Outpatient Medications   Medication Sig Dispense Refill    acyclovir (ZOVIRAX) 400 mg tablet Take 1 Tab by mouth two (2) times a day. 60 Tab 5    amoxicillin-clavulanate (AUGMENTIN) 875-125 mg per tablet Take 1 Tab by mouth two (2) times a day for 14 days. 28 Tab 0    levoFLOXacin (LEVAQUIN) 500 mg tablet Take 1 Tab by mouth daily for 14 days.  14 Tab 0    fluconazole (DIFLUCAN) 200 mg tablet Take 1 Tab by mouth daily for 14 days. FDA advises cautious prescribing of oral fluconazole in pregnancy. 14 Tab 0    ergocalciferol (ERGOCALCIFEROL) 1,250 mcg (50,000 unit) capsule Take 1 Cap by mouth every seven (7) days. 4 Cap 2    gabapentin (NEURONTIN) 300 mg capsule TAKE 1 CAPSULE BY MOUTH THREE TIMES DAILY      lenalidomide 15 mg cap Take 1 daily for 14days for 14 day cycle      rivaroxaban (XARELTO) 20 mg tab tablet Take 20 mg by mouth daily.  traMADoL (ULTRAM) 50 mg tablet TAKE 1 TABLET BY MOUTH TWICE DAILY AS NEEDED FOR PAIN      simvastatin (ZOCOR) 20 mg tablet Take 1 Tab by mouth nightly. Indications: high cholesterol and high triglycerides 90 Tab 4    lisinopriL (PRINIVIL, ZESTRIL) 10 mg tablet Take 1 Tab by mouth daily. Indications: high blood pressure 90 Tab 4    azithromycin (ZITHROMAX) 250 mg tablet Take two tablets today then one tablet daily 6 Tab 1    fluticasone propionate (FLONASE) 50 mcg/actuation nasal spray 2 Sprays by Both Nostrils route daily. 1 Bottle 2    diclofenac (VOLTAREN XR) 100 mg ER tablet TAKE 1 TABLET BY MOUTH ONCE DAILY  2    esomeprazole (NEXIUM) 20 mg capsule Take 1 Cap by mouth daily. 90 Cap 4     Current Facility-Administered Medications   Medication Dose Route Frequency Provider Last Rate Last Admin    central line flush (saline) syringe 10 mL  10 mL InterCATHeter Q8H Lashonda Mercado MD   10 mL at 01/01/21 1010       OBJECTIVE:  Visit Vitals  /77 (BP 1 Location: Left arm, BP Patient Position: Sitting)   Pulse (!) 108   Temp 97.6 °F (36.4 °C)   Resp 16   SpO2 98%       Physical Exam:  Constitutional: Well developed, well nourished female in no acute distress, sitting comfortably in the examination room chair. Appears fatigued. HEENT: Normocephalic and atraumatic. Sclerae anicteric. Neck supple without JVD. No thyromegaly present. Skin Warm and dry. No bruising and no rash noted. No erythema. No pallor.     Respiratory Lungs are clear to auscultation bilaterally without wheezes, rales or rhonchi, normal air exchange without accessory muscle use. CVS Normal rate, regular rhythm and normal S1 and S2. No murmurs, gallops, or rubs. Abdomen Soft, nontender and nondistended, normoactive bowel sounds. No palpable mass. No hepatosplenomegaly. Neuro Grossly nonfocal with no obvious sensory or motor deficits. MSK Normal range of motion in general.  No edema and no tenderness. Psych Appropriate mood and affect. Labs:  Recent Results (from the past 24 hour(s))   CBC WITH AUTOMATED DIFF    Collection Time: 01/01/21 10:10 AM   Result Value Ref Range    WBC 6.0 4.3 - 11.1 K/uL    RBC 3.66 (L) 4.05 - 5.25 M/uL    HGB 11.4 (L) 11.7 - 15.4 g/dL    HCT 35.6 (L) 35.8 - 46.3 %    MCV 97.3 79.6 - 97.8 FL    MCH 31.1 26.1 - 32.9 PG    MCHC 32.0 31.4 - 35.0 g/dL    RDW 15.0 (H) 11.9 - 14.6 %    PLATELET 682 812 - 143 K/uL    MPV 9.7 9.4 - 12.3 FL    ABSOLUTE NRBC 0.00 0.0 - 0.2 K/uL    DF AUTOMATED      NEUTROPHILS 95 (H) 43 - 78 %    LYMPHOCYTES 3 (L) 13 - 44 %    MONOCYTES 0 (L) 4.0 - 12.0 %    EOSINOPHILS 1 0.5 - 7.8 %    BASOPHILS 0 0.0 - 2.0 %    IMMATURE GRANULOCYTES 1 0.0 - 5.0 %    ABS. NEUTROPHILS 5.7 1.7 - 8.2 K/UL    ABS. LYMPHOCYTES 0.2 (L) 0.5 - 4.6 K/UL    ABS. MONOCYTES 0.0 (L) 0.1 - 1.3 K/UL    ABS. EOSINOPHILS 0.1 0.0 - 0.8 K/UL    ABS. BASOPHILS 0.0 0.0 - 0.2 K/UL    ABS. IMM. GRANS. 0.0 0.0 - 0.5 K/UL   METABOLIC PANEL, COMPREHENSIVE    Collection Time: 01/01/21 10:10 AM   Result Value Ref Range    Sodium 140 136 - 145 mmol/L    Potassium 3.6 3.5 - 5.1 mmol/L    Chloride 108 (H) 98 - 107 mmol/L    CO2 26 21 - 32 mmol/L    Anion gap 6 (L) 7 - 16 mmol/L    Glucose 110 (H) 65 - 100 mg/dL    BUN 13 8 - 23 MG/DL    Creatinine 0.70 0.6 - 1.0 MG/DL    GFR est AA >60 >60 ml/min/1.73m2    GFR est non-AA >60 >60 ml/min/1.73m2    Calcium 9.1 8.3 - 10.4 MG/DL    Bilirubin, total 0.4 0.2 - 1.1 MG/DL    ALT (SGPT) 29 12 - 65 U/L    AST (SGOT) 11 (L) 15 - 37 U/L    Alk. phosphatase 59 50 - 136 U/L    Protein, total 6.2 (L) 6.3 - 8.2 g/dL    Albumin 3.3 3.2 - 4.6 g/dL    Globulin 2.9 2.3 - 3.5 g/dL    A-G Ratio 1.1 (L) 1.2 - 3.5     GGT    Collection Time: 01/01/21 10:10 AM   Result Value Ref Range    GGT 32 5 - 55 U/L   LD    Collection Time: 01/01/21 10:10 AM   Result Value Ref Range     (H) 110 - 210 U/L   MAGNESIUM    Collection Time: 01/01/21 10:10 AM   Result Value Ref Range    Magnesium 2.0 1.8 - 2.4 mg/dL   PHOSPHORUS    Collection Time: 01/01/21 10:10 AM   Result Value Ref Range    Phosphorus 3.9 (H) 2.3 - 3.7 MG/DL        Imaging:  No procedure found. ASSESSMENT:  Patient Active Problem List   Diagnosis Code    Essential hypertension I10    Hyperlipidemia E78.5    Left hip pain M25.552    GERD (gastroesophageal reflux disease) K21.9    Multiple myeloma not having achieved remission ([facility] [address] C90.00          PLAN:  MM: June 2020, s/p 4 cycles RVd with MN, now undergoing autologous stem cell transplant with melphalan conditioning, D0 12/30/2020. Here for infusion visit, D+2 autoSCT. Doing OK. She is having more nausea, this is keeping her from eating very much. No diarrhea. No abdominal pain. Appetite is poor. Fatigued. No fevers. Labs reviewed and unremarkable. IV Zofran given today, encouraged to take Zofran/Compazine ATC staggered every 4 hours for symptom control. Otherwise she will continue to try and increase her PO intake, which may help with fatigue and nausea. She will call with any more concerning symptoms. Follow-up daily for transplant assessment.             Ludmila Tipton MD  [facility] Hematology and Oncology  [address] [facility], [address] [place] [address]  Office : [phone]  Fax : [phone]

## 2021-01-02 ENCOUNTER — HOSPITAL ENCOUNTER (OUTPATIENT)
Dept: INFUSION THERAPY | Age: 61
Discharge: HOME | End: 2021-01-02
Payer: COMMERCIAL

## 2021-01-02 VITALS
DIASTOLIC BLOOD PRESSURE: 89 MMHG | OXYGEN SATURATION: 99 % | HEART RATE: 127 BPM | RESPIRATION RATE: 16 BRPM | TEMPERATURE: 97.1 F | BODY MASS INDEX: 25.7 KG/M2 | WEIGHT: 131.6 LBS | SYSTOLIC BLOOD PRESSURE: 154 MMHG

## 2021-01-02 DIAGNOSIS — C90.00 MULTIPLE MYELOMA NOT HAVING ACHIEVED REMISSION (HCC): Primary | ICD-10-CM

## 2021-01-02 DIAGNOSIS — T45.1X5A CHEMOTHERAPY-INDUCED NAUSEA: ICD-10-CM

## 2021-01-02 DIAGNOSIS — R11.0 CHEMOTHERAPY-INDUCED NAUSEA: ICD-10-CM

## 2021-01-02 DIAGNOSIS — R53.83 FATIGUE DUE TO TREATMENT: ICD-10-CM

## 2021-01-02 LAB
ANION GAP SERPL CALC-SCNC: 6 MMOL/L (ref 7–16)
BASOPHILS # BLD: 0 K/UL (ref 0–0.2)
BASOPHILS NFR BLD: 0 % (ref 0–2)
BUN SERPL-MCNC: 12 MG/DL (ref 8–23)
CALCIUM SERPL-MCNC: 8.8 MG/DL (ref 8.3–10.4)
CHLORIDE SERPL-SCNC: 109 MMOL/L (ref 98–107)
CO2 SERPL-SCNC: 25 MMOL/L (ref 21–32)
CREAT SERPL-MCNC: 0.7 MG/DL (ref 0.6–1)
DIFFERENTIAL METHOD BLD: ABNORMAL
EOSINOPHIL # BLD: 0.1 K/UL (ref 0–0.8)
EOSINOPHIL NFR BLD: 4 % (ref 0.5–7.8)
ERYTHROCYTE [DISTWIDTH] IN BLOOD BY AUTOMATED COUNT: 14.6 % (ref 11.9–14.6)
GLUCOSE SERPL-MCNC: 133 MG/DL (ref 65–100)
HCT VFR BLD AUTO: 36.9 % (ref 35.8–46.3)
HGB BLD-MCNC: 11.8 G/DL (ref 11.7–15.4)
IMM GRANULOCYTES # BLD AUTO: 0 K/UL (ref 0–0.5)
IMM GRANULOCYTES NFR BLD AUTO: 1 % (ref 0–5)
LYMPHOCYTES # BLD: 0.1 K/UL (ref 0.5–4.6)
LYMPHOCYTES NFR BLD: 4 % (ref 13–44)
MCH RBC QN AUTO: 31.1 PG (ref 26.1–32.9)
MCHC RBC AUTO-ENTMCNC: 32 G/DL (ref 31.4–35)
MCV RBC AUTO: 97.4 FL (ref 79.6–97.8)
MONOCYTES # BLD: 0 K/UL (ref 0.1–1.3)
MONOCYTES NFR BLD: 0 % (ref 4–12)
NEUTS SEG # BLD: 3.1 K/UL (ref 1.7–8.2)
NEUTS SEG NFR BLD: 91 % (ref 43–78)
NRBC # BLD: 0 K/UL (ref 0–0.2)
PLATELET # BLD AUTO: 154 K/UL (ref 150–450)
PMV BLD AUTO: 9.4 FL (ref 9.4–12.3)
POTASSIUM SERPL-SCNC: 3.2 MMOL/L (ref 3.5–5.1)
RBC # BLD AUTO: 3.79 M/UL (ref 4.05–5.25)
SODIUM SERPL-SCNC: 140 MMOL/L (ref 136–145)
WBC # BLD AUTO: 3.4 K/UL (ref 4.3–11.1)

## 2021-01-02 PROCEDURE — 96375 TX/PRO/DX INJ NEW DRUG ADDON: CPT

## 2021-01-02 PROCEDURE — 99214 OFFICE O/P EST MOD 30 MIN: CPT | Performed by: INTERNAL MEDICINE

## 2021-01-02 PROCEDURE — 85025 COMPLETE CBC W/AUTO DIFF WBC: CPT

## 2021-01-02 PROCEDURE — 80048 BASIC METABOLIC PNL TOTAL CA: CPT

## 2021-01-02 PROCEDURE — 96374 THER/PROPH/DIAG INJ IV PUSH: CPT

## 2021-01-02 PROCEDURE — 74011250636 HC RX REV CODE- 250/636: Performed by: INTERNAL MEDICINE

## 2021-01-02 PROCEDURE — 96361 HYDRATE IV INFUSION ADD-ON: CPT

## 2021-01-02 PROCEDURE — 74011000258 HC RX REV CODE- 258: Performed by: INTERNAL MEDICINE

## 2021-01-02 RX ORDER — ONDANSETRON 2 MG/ML
8 INJECTION INTRAMUSCULAR; INTRAVENOUS
Status: COMPLETED | OUTPATIENT
Start: 2021-01-02 | End: 2021-01-02

## 2021-01-02 RX ORDER — SODIUM CHLORIDE 0.9 % (FLUSH) 0.9 %
10-30 SYRINGE (ML) INJECTION AS NEEDED
Status: DISCONTINUED | OUTPATIENT
Start: 2021-01-02 | End: 2021-01-04 | Stop reason: HOSPADM

## 2021-01-02 RX ORDER — FAMOTIDINE 10 MG/ML
40 INJECTION INTRAVENOUS DAILY
Status: DISCONTINUED | OUTPATIENT
Start: 2021-01-02 | End: 2021-01-02 | Stop reason: SDUPTHER

## 2021-01-02 RX ORDER — DEXTROSE, SODIUM CHLORIDE, AND POTASSIUM CHLORIDE 5; .45; .15 G/100ML; G/100ML; G/100ML
1000 INJECTION INTRAVENOUS
Status: COMPLETED | OUTPATIENT
Start: 2021-01-02 | End: 2021-01-02

## 2021-01-02 RX ADMIN — ONDANSETRON 8 MG: 2 INJECTION INTRAMUSCULAR; INTRAVENOUS at 10:19

## 2021-01-02 RX ADMIN — DEXTROSE, SODIUM CHLORIDE, AND POTASSIUM CHLORIDE 1000 ML: 5; .45; .15 INJECTION INTRAVENOUS at 10:22

## 2021-01-02 RX ADMIN — Medication 10 ML: at 10:00

## 2021-01-02 RX ADMIN — FAMOTIDINE 40 MG: 10 INJECTION INTRAVENOUS at 10:01

## 2021-01-02 RX ADMIN — Medication 10 ML: at 12:30

## 2021-01-02 NOTE — PROGRESS NOTES
[facility] Hematology and Oncology: Office Visit Established Patient    Chief Complaint:    No chief complaint on file. History of Present Illness:  Ms. Jennifer Ames is a 56 y.o. female who returns today for management of multiple myeloma and autologous stem cell transplant. In 6/2020 she was diagnosed with Multiple Myeloma, hypo-secretory Lambda light chain, 1q+, del. 13q and t( 11;14 ), ISS Stage 2; at diagnosis her Lambda level was 246. She has received 4 cycles of RVd and achieved a [place], her Lambda level decreased to 46, her extensive Hypercoagulable work-up revealed compound heterozygosity for the MTHFR mutation without Hyperhomocysteinemia. She underwent an Autologous PBHCT on 12/30/2020. Here for infusion visit, D+3 autoSCT. Doing about the same. Nausea continues despite consistent Zofran/Compazine at home. Not eating much, had two pieces of toast this AM and drinking moderate amounts of fluids. No diarrhea. No pain. Fatigue unchanged. No fevers or infectious symptoms. Review of Systems:  Constitutional: Positive for fatigue. HENT: Negative. Eyes: Negative. Respiratory: Negative. Cardiovascular: Negative. Gastrointestinal: Positive for nausea. Genitourinary: Negative. Musculoskeletal: Negative. Skin: Negative. Neurological: Negative. Endo/Heme/Allergies: Negative. Psychiatric/Behavioral: Negative. All other systems reviewed and are negative.      Allergies   Allergen Reactions    Erythromycin Nausea Only    Penicillins Rash     Past Medical History:   Diagnosis Date    DVT (deep venous thrombosis) ([facility])     Essential hypertension 3/29/2018    GERD (gastroesophageal reflux disease) 3/29/2018    Hyperlipemia     Hyperlipidemia 3/29/2018    Hypertension     Left hip pain 3/29/2018    Multiple myeloma ([facility] [address] 06/2020     Past Surgical History:   Procedure Laterality Date    HX OTHER SURGICAL      open chest surgey    HX TUBAL LIGATION      IR INSERT NON TUNL CVC OVER 5 YRS  12/7/2020    IR INSERT TUNL CVC W PORT OVER 5 YEARS  11/17/2020     Family History   Problem Relation Age of Onset    Breast Cancer Neg Hx     Colon Cancer Neg Hx     Ovarian Cancer Neg Hx      Social History     Socioeconomic History    Marital status:      Spouse name: Not on file    Number of children: Not on file    Years of education: Not on file    Highest education level: Not on file   Occupational History    Not on file   Social Needs    Financial resource strain: Not on file    Food insecurity     Worry: Not on file     Inability: Not on file    Transportation needs     Medical: Not on file     Non-medical: Not on file   Tobacco Use    Smoking status: Never Smoker    Smokeless tobacco: Never Used   Substance and Sexual Activity    Alcohol use: No    Drug use: No    Sexual activity: Yes     Partners: Male     Birth control/protection: Surgical     Comment: tubal   Lifestyle    Physical activity     Days per week: Not on file     Minutes per session: Not on file    Stress: Not on file   Relationships    Social connections     Talks on phone: Not on file     Gets together: Not on file     Attends Spiritism service: Not on file     Active member of club or organization: Not on file     Attends meetings of clubs or organizations: Not on file     Relationship status: Not on file    Intimate partner violence     Fear of current or ex partner: Not on file     Emotionally abused: Not on file     Physically abused: Not on file     Forced sexual activity: Not on file   Other Topics Concern    Not on file   Social History Narrative    Not on file     Current Outpatient Medications   Medication Sig Dispense Refill    acyclovir (ZOVIRAX) 400 mg tablet Take 1 Tab by mouth two (2) times a day. 60 Tab 5    amoxicillin-clavulanate (AUGMENTIN) 875-125 mg per tablet Take 1 Tab by mouth two (2) times a day for 14 days.  28 Tab 0    levoFLOXacin (LEVAQUIN) 500 mg tablet Take 1 Tab by mouth daily for 14 days. 14 Tab 0    fluconazole (DIFLUCAN) 200 mg tablet Take 1 Tab by mouth daily for 14 days. FDA advises cautious prescribing of oral fluconazole in pregnancy. 14 Tab 0    ergocalciferol (ERGOCALCIFEROL) 1,250 mcg (50,000 unit) capsule Take 1 Cap by mouth every seven (7) days. 4 Cap 2    gabapentin (NEURONTIN) 300 mg capsule TAKE 1 CAPSULE BY MOUTH THREE TIMES DAILY      lenalidomide 15 mg cap Take 1 daily for 14days for 14 day cycle      rivaroxaban (XARELTO) 20 mg tab tablet Take 20 mg by mouth daily.  traMADoL (ULTRAM) 50 mg tablet TAKE 1 TABLET BY MOUTH TWICE DAILY AS NEEDED FOR PAIN      simvastatin (ZOCOR) 20 mg tablet Take 1 Tab by mouth nightly. Indications: high cholesterol and high triglycerides 90 Tab 4    lisinopriL (PRINIVIL, ZESTRIL) 10 mg tablet Take 1 Tab by mouth daily. Indications: high blood pressure 90 Tab 4    azithromycin (ZITHROMAX) 250 mg tablet Take two tablets today then one tablet daily 6 Tab 1    fluticasone propionate (FLONASE) 50 mcg/actuation nasal spray 2 Sprays by Both Nostrils route daily. 1 Bottle 2    diclofenac (VOLTAREN XR) 100 mg ER tablet TAKE 1 TABLET BY MOUTH ONCE DAILY  2    esomeprazole (NEXIUM) 20 mg capsule Take 1 Cap by mouth daily.  90 Cap 4     Current Facility-Administered Medications   Medication Dose Route Frequency Provider Last Rate Last Admin    central line flush (saline) syringe 10-30 mL  10-30 mL InterCATHeter PRN Jesus Oliveira MD   10 mL at 01/02/21 1000     Facility-Administered Medications Ordered in Other Encounters   Medication Dose Route Frequency Provider Last Rate Last Admin    central line flush (saline) syringe 10 mL  10 mL InterCATHeter Q8H Jesus Oliveira MD   10 mL at 01/01/21 1255       OBJECTIVE:  Visit Vitals  BP (!) 154/89 (BP 1 Location: Right arm, BP Patient Position: Sitting)   Pulse (!) 127   Temp 97.1 °F (36.2 °C)   Resp 16   Wt 131 lb 9.6 oz (59.7 kg)   SpO2 99%   BMI 25.70 kg/m²       Physical Exam:  Constitutional: Well developed, well nourished female in no acute distress, sitting comfortably in the examination room chair. Appears fatigued. HEENT: Normocephalic and atraumatic. Sclerae anicteric. Neck supple without JVD. No thyromegaly present. Skin Warm and dry. No bruising and no rash noted. No erythema. No pallor. Respiratory Lungs are clear to auscultation bilaterally without wheezes, rales or rhonchi, normal air exchange without accessory muscle use. CVS Mildly tachycardic rate, regular rhythm and normal S1 and S2. No murmurs, gallops, or rubs. Abdomen Soft, nontender and nondistended, normoactive bowel sounds. No palpable mass. No hepatosplenomegaly. Neuro Grossly nonfocal with no obvious sensory or motor deficits. MSK Normal range of motion in general.  No edema and no tenderness. Psych Appropriate mood and affect. Labs:  Recent Results (from the past 24 hour(s))   CBC WITH AUTOMATED DIFF    Collection Time: 01/02/21 10:10 AM   Result Value Ref Range    WBC 3.4 (L) 4.3 - 11.1 K/uL    RBC 3.79 (L) 4.05 - 5.25 M/uL    HGB 11.8 11.7 - 15.4 g/dL    HCT 36.9 35.8 - 46.3 %    MCV 97.4 79.6 - 97.8 FL    MCH 31.1 26.1 - 32.9 PG    MCHC 32.0 31.4 - 35.0 g/dL    RDW 14.6 11.9 - 14.6 %    PLATELET 055 324 - 551 K/uL    MPV 9.4 9.4 - 12.3 FL    ABSOLUTE NRBC 0.00 0.0 - 0.2 K/uL    DF AUTOMATED      NEUTROPHILS 91 (H) 43 - 78 %    LYMPHOCYTES 4 (L) 13 - 44 %    MONOCYTES 0 (L) 4.0 - 12.0 %    EOSINOPHILS 4 0.5 - 7.8 %    BASOPHILS 0 0.0 - 2.0 %    IMMATURE GRANULOCYTES 1 0.0 - 5.0 %    ABS. NEUTROPHILS 3.1 1.7 - 8.2 K/UL    ABS. LYMPHOCYTES 0.1 (L) 0.5 - 4.6 K/UL    ABS. MONOCYTES 0.0 (L) 0.1 - 1.3 K/UL    ABS. EOSINOPHILS 0.1 0.0 - 0.8 K/UL    ABS. BASOPHILS 0.0 0.0 - 0.2 K/UL    ABS. IMM.  GRANS. 0.0 0.0 - 0.5 K/UL   METABOLIC PANEL, BASIC    Collection Time: 01/02/21 10:10 AM   Result Value Ref Range    Sodium 140 136 - 145 mmol/L    Potassium 3.2 (L) 3.5 - 5.1 mmol/L Chloride 109 (H) 98 - 107 mmol/L    CO2 25 21 - 32 mmol/L    Anion gap 6 (L) 7 - 16 mmol/L    Glucose 133 (H) 65 - 100 mg/dL    BUN 12 8 - 23 MG/DL    Creatinine 0.70 0.6 - 1.0 MG/DL    GFR est AA >60 >60 ml/min/1.73m2    GFR est non-AA >60 >60 ml/min/1.73m2    Calcium 8.8 8.3 - 10.4 MG/DL        Imaging:  No procedure found. ASSESSMENT:  Patient Active Problem List   Diagnosis Code    Essential hypertension I10    Hyperlipidemia E78.5    Left hip pain M25.552    GERD (gastroesophageal reflux disease) K21.9    Multiple myeloma not having achieved remission ([facility] [address] C90.00          PLAN:  MM: June 2020, s/p 4 cycles RVd with ND, now undergoing autologous stem cell transplant with melphalan conditioning, D0 12/30/2020. Here for infusion visit, D+3 autoSCT. Doing about the same. Nausea continues despite consistent Zofran/Compazine at home. Not eating much, had two pieces of toast this AM and drinking moderate amounts of fluids. No diarrhea. No pain. Fatigue unchanged. No fevers or infectious symptoms. Tachycardic but BP is adequate. Labs reviewed and unremarkable. Hgb improved. WBC dropping consistent with expected transplant course, platelets still in normal range for now. Continue supportive care, IV fluids, KCL today for hypokalemia 3.2. Continue Zofran/Compazine ATC staggered every 4 hours for symptom control, add Ativan as well. She will call with any more concerning symptoms. Follow-up daily for transplant assessment.             Truman Kimball MD  [facility] Hematology and Oncology  [address] [place] [address]  Office : [phone]  Fax : [phone]

## 2021-01-02 NOTE — PROGRESS NOTES
Diagnosis: Multiple Myeloma  Transplant Date: 12/30/2020  Day: (+/-) Day +3  Chemo regimen used: Melphalan   Labs needed at next visit: CBC with Differential, BMP  Labs not resulted that need follow-up: none  Next Appointment scheduled: 1/3/2021 at 10am.  BMT assessments and toxicities completed. WBC/ANC=3.4/3.1  Prophylactic medications started Day +1 (12/31/2020)  Granix not started. Day +6. Toxicities greater than 2 :none  Bactrim or equivalent not initiated yet  Patient seen by Dr. Nina Russ at chairside. New orders received: None  Issues: Pt still with hiccups, indigestion and nausea (prn Zofran administered during visit). Per Dr. Nina Russ, pt aware she is to take PRN antiemetics more frequently to help prevent nausea. . Pt also encouraged to eat small frequent meals. 40 mg of IV Pepcid administered as ordered. Dr. Nina Russ sending prescription to patient's pharmacy in [facility] for PO Ativan to help with nausea. Pt discharged ambulatory with spouse.

## 2021-01-03 ENCOUNTER — HOSPITAL ENCOUNTER (OUTPATIENT)
Dept: INFUSION THERAPY | Age: 61
Discharge: HOME | End: 2021-01-03
Payer: COMMERCIAL

## 2021-01-03 VITALS
BODY MASS INDEX: 25.55 KG/M2 | HEART RATE: 112 BPM | SYSTOLIC BLOOD PRESSURE: 127 MMHG | OXYGEN SATURATION: 99 % | RESPIRATION RATE: 16 BRPM | WEIGHT: 130.8 LBS | TEMPERATURE: 98.2 F | DIASTOLIC BLOOD PRESSURE: 82 MMHG

## 2021-01-03 DIAGNOSIS — C90.00 MULTIPLE MYELOMA NOT HAVING ACHIEVED REMISSION (HCC): Primary | ICD-10-CM

## 2021-01-03 LAB
ANION GAP SERPL CALC-SCNC: 7 MMOL/L (ref 7–16)
BASOPHILS # BLD: 0 K/UL (ref 0–0.2)
BASOPHILS NFR BLD: 0 % (ref 0–2)
BUN SERPL-MCNC: 14 MG/DL (ref 8–23)
CALCIUM SERPL-MCNC: 8.7 MG/DL (ref 8.3–10.4)
CHLORIDE SERPL-SCNC: 108 MMOL/L (ref 98–107)
CO2 SERPL-SCNC: 25 MMOL/L (ref 21–32)
CREAT SERPL-MCNC: 0.7 MG/DL (ref 0.6–1)
DIFFERENTIAL METHOD BLD: ABNORMAL
EOSINOPHIL # BLD: 0.1 K/UL (ref 0–0.8)
EOSINOPHIL NFR BLD: 3 % (ref 0.5–7.8)
ERYTHROCYTE [DISTWIDTH] IN BLOOD BY AUTOMATED COUNT: 14.3 % (ref 11.9–14.6)
GLUCOSE SERPL-MCNC: 124 MG/DL (ref 65–100)
HCT VFR BLD AUTO: 35.9 % (ref 35.8–46.3)
HGB BLD-MCNC: 11.6 G/DL (ref 11.7–15.4)
IMM GRANULOCYTES # BLD AUTO: 0 K/UL (ref 0–0.5)
IMM GRANULOCYTES NFR BLD AUTO: 1 % (ref 0–5)
LYMPHOCYTES # BLD: 0.1 K/UL (ref 0.5–4.6)
LYMPHOCYTES NFR BLD: 6 % (ref 13–44)
MCH RBC QN AUTO: 31.4 PG (ref 26.1–32.9)
MCHC RBC AUTO-ENTMCNC: 32.3 G/DL (ref 31.4–35)
MCV RBC AUTO: 97.3 FL (ref 79.6–97.8)
MONOCYTES # BLD: 0 K/UL (ref 0.1–1.3)
MONOCYTES NFR BLD: 1 % (ref 4–12)
NEUTS SEG # BLD: 1.8 K/UL (ref 1.7–8.2)
NEUTS SEG NFR BLD: 89 % (ref 43–78)
NRBC # BLD: 0 K/UL (ref 0–0.2)
PLATELET # BLD AUTO: 133 K/UL (ref 150–450)
PMV BLD AUTO: 9.9 FL (ref 9.4–12.3)
POTASSIUM SERPL-SCNC: 3.4 MMOL/L (ref 3.5–5.1)
RBC # BLD AUTO: 3.69 M/UL (ref 4.05–5.25)
SODIUM SERPL-SCNC: 140 MMOL/L (ref 136–145)
WBC # BLD AUTO: 2 K/UL (ref 4.3–11.1)

## 2021-01-03 PROCEDURE — 74011000258 HC RX REV CODE- 258: Performed by: INTERNAL MEDICINE

## 2021-01-03 PROCEDURE — 99214 OFFICE O/P EST MOD 30 MIN: CPT | Performed by: INTERNAL MEDICINE

## 2021-01-03 PROCEDURE — 96361 HYDRATE IV INFUSION ADD-ON: CPT

## 2021-01-03 PROCEDURE — 85025 COMPLETE CBC W/AUTO DIFF WBC: CPT

## 2021-01-03 PROCEDURE — 96375 TX/PRO/DX INJ NEW DRUG ADDON: CPT

## 2021-01-03 PROCEDURE — 80048 BASIC METABOLIC PNL TOTAL CA: CPT

## 2021-01-03 PROCEDURE — 74011250636 HC RX REV CODE- 250/636: Performed by: INTERNAL MEDICINE

## 2021-01-03 PROCEDURE — 96374 THER/PROPH/DIAG INJ IV PUSH: CPT

## 2021-01-03 RX ORDER — ONDANSETRON 2 MG/ML
8 INJECTION INTRAMUSCULAR; INTRAVENOUS
Status: COMPLETED | OUTPATIENT
Start: 2021-01-03 | End: 2021-01-03

## 2021-01-03 RX ORDER — DEXTROSE, SODIUM CHLORIDE, AND POTASSIUM CHLORIDE 5; .45; .15 G/100ML; G/100ML; G/100ML
1000 INJECTION INTRAVENOUS
Status: COMPLETED | OUTPATIENT
Start: 2021-01-03 | End: 2021-01-03

## 2021-01-03 RX ORDER — SODIUM CHLORIDE 0.9 % (FLUSH) 0.9 %
10 SYRINGE (ML) INJECTION AS NEEDED
Status: DISCONTINUED | OUTPATIENT
Start: 2021-01-03 | End: 2021-01-05 | Stop reason: HOSPADM

## 2021-01-03 RX ADMIN — Medication 10 ML: at 10:30

## 2021-01-03 RX ADMIN — Medication 10 ML: at 08:05

## 2021-01-03 RX ADMIN — ONDANSETRON 8 MG: 2 INJECTION INTRAMUSCULAR; INTRAVENOUS at 08:10

## 2021-01-03 RX ADMIN — FAMOTIDINE: 10 INJECTION INTRAVENOUS at 08:12

## 2021-01-03 RX ADMIN — DEXTROSE, SODIUM CHLORIDE, AND POTASSIUM CHLORIDE 1000 ML: 5; .45; .15 INJECTION INTRAVENOUS at 08:28

## 2021-01-03 NOTE — PROGRESS NOTES
[employer] [facility]: Office Visit Established Patient    Chief Complaint:    No chief complaint on file. History of Present Illness:  Ms. Leonidas Allen is a 56 y.o. female who returns today for management of multiple myeloma and autologous stem cell transplant. In 6/2020 she was diagnosed with Multiple Myeloma, hypo-secretory Lambda light chain, 1q+, del. 13q and t( 11;14 ), ISS Stage 2; at diagnosis her Lambda level was 246. She has received 4 cycles of RVd and achieved a DE, her Lambda level decreased to 46, her extensive Hypercoagulable work-up revealed compound heterozygosity for the MTHFR mutation without Hyperhomocysteinemia. She underwent an Autologous PBHCT on 12/30/2020. Here for infusion visit, D+4 autoSCT. Feeling OK. Ativan did help nausea but made her very sleepy. Starting with some diarrhea yesterday as well. Appetite remains poor. Continues Zofran/Compazine ATC at home. No pain. Fatigue unchanged. No fevers or infectious symptoms. Review of Systems:  Constitutional: Positive for fatigue. HENT: Negative. Eyes: Negative. Respiratory: Negative. Cardiovascular: Negative. Gastrointestinal: Positive for nausea and diarrhea. Genitourinary: Negative. Musculoskeletal: Negative. Skin: Negative. Neurological: Negative. Endo/Heme/Allergies: Negative. Psychiatric/Behavioral: Negative. All other systems reviewed and are negative.      Allergies   Allergen Reactions    Erythromycin Nausea Only    Penicillins Rash     Past Medical History:   Diagnosis Date    [facility] thrombosis) ([facility])     Essential hypertension 3/29/2018    GERD (gastroesophageal reflux disease) 3/29/2018    Hyperlipemia     Hyperlipidemia 3/29/2018    Hypertension     Left hip pain 3/29/2018    Multiple myeloma ([facility] [address] 06/2020     Past Surgical History:   Procedure Laterality Date    HX OTHER SURGICAL      open chest surgey    HX TUBAL LIGATION      IR INSERT NON TUNL CVC OVER 5 YRS  12/7/2020    IR INSERT TUNL CVC W PORT OVER 5 YEARS  11/17/2020     Family History   Problem Relation Age of Onset    Breast Cancer Neg Hx     Colon Cancer Neg Hx     Ovarian Cancer Neg Hx      Social History     Socioeconomic History    Marital status:      Spouse name: Not on file    Number of children: Not on file    Years of education: Not on file    Highest education level: Not on file   Occupational History    Not on file   Social Needs    Financial resource strain: Not on file    Food insecurity     Worry: Not on file     Inability: Not on file    Transportation needs     Medical: Not on file     Non-medical: Not on file   Tobacco Use    Smoking status: Never Smoker    Smokeless tobacco: Never Used   Substance and Sexual Activity    Alcohol use: No    Drug use: No    Sexual activity: Yes     Partners: Male     Birth control/protection: Surgical     Comment: tubal   Lifestyle    Physical activity     Days per week: Not on file     Minutes per session: Not on file    Stress: Not on file   Relationships    Social connections     Talks on phone: Not on file     Gets together: Not on file     Attends Muslim service: Not on file     Active member of club or organization: Not on file     Attends meetings of clubs or organizations: Not on file     Relationship status: Not on file    Intimate partner violence     Fear of current or ex partner: Not on file     Emotionally abused: Not on file     Physically abused: Not on file     Forced sexual activity: Not on file   Other Topics Concern    Not on file   Social History Narrative    Not on file     Current Outpatient Medications   Medication Sig Dispense Refill    LORazepam (ATIVAN) 1 mg tablet Take 1 Tab by mouth every four (4) hours as needed (nausea). Max Daily Amount: 6 mg.  Indications: prevent nausea and vomiting from cancer chemotherapy 30 Tab 1    ondansetron hcl (ZOFRAN) 8 mg tablet Take 1 Tab by mouth every eight (8) hours as needed for Nausea or Vomiting. Indications: nausea and vomiting caused by cancer drugs 30 Tab 5    prochlorperazine (COMPAZINE) 10 mg tablet Take 1 Tab by mouth every six (6) hours as needed for Nausea. Indications: nausea and vomiting caused by cancer drugs 30 Tab 2    acyclovir (ZOVIRAX) 400 mg tablet Take 1 Tab by mouth two (2) times a day. 60 Tab 5    amoxicillin-clavulanate (AUGMENTIN) 875-125 mg per tablet Take 1 Tab by mouth two (2) times a day for 14 days. 28 Tab 0    levoFLOXacin (LEVAQUIN) 500 mg tablet Take 1 Tab by mouth daily for 14 days. 14 Tab 0    fluconazole (DIFLUCAN) 200 mg tablet Take 1 Tab by mouth daily for 14 days. FDA advises cautious prescribing of oral fluconazole in pregnancy. 14 Tab 0    ergocalciferol (ERGOCALCIFEROL) 1,250 mcg (50,000 unit) capsule Take 1 Cap by mouth every seven (7) days. 4 Cap 2    gabapentin (NEURONTIN) 300 mg capsule TAKE 1 CAPSULE BY MOUTH THREE TIMES DAILY      lenalidomide 15 mg cap Take 1 daily for 14days for 14 day cycle      rivaroxaban (XARELTO) 20 mg tab tablet Take 20 mg by mouth daily.  traMADoL (ULTRAM) 50 mg tablet TAKE 1 TABLET BY MOUTH TWICE DAILY AS NEEDED FOR PAIN      simvastatin (ZOCOR) 20 mg tablet Take 1 Tab by mouth nightly. Indications: high cholesterol and high triglycerides 90 Tab 4    lisinopriL (PRINIVIL, ZESTRIL) 10 mg tablet Take 1 Tab by mouth daily. Indications: high blood pressure 90 Tab 4    azithromycin (ZITHROMAX) 250 mg tablet Take two tablets today then one tablet daily 6 Tab 1    fluticasone propionate (FLONASE) 50 mcg/actuation nasal spray 2 Sprays by Both Nostrils route daily. 1 Bottle 2    diclofenac (VOLTAREN XR) 100 mg ER tablet TAKE 1 TABLET BY MOUTH ONCE DAILY  2    esomeprazole (NEXIUM) 20 mg capsule Take 1 Cap by mouth daily.  90 Cap 4     Current Facility-Administered Medications   Medication Dose Route Frequency Provider Last Rate Last Admin    central line flush (saline) syringe 10 mL 10 mL InterCATHeter PRN Cahlino Walker MD   10 mL at 01/03/21 1030    dextrose 5% - 0.45% NaCl with KCl 20 mEq/L infusion 1,000 mL  1,000 mL IntraVENous ONCE PRN Chalino Walker MD   Stopped at 01/03/21 1028     Facility-Administered Medications Ordered in Other Encounters   Medication Dose Route Frequency Provider Last Rate Last Admin    central line flush (saline) syringe 10-30 mL  10-30 mL InterCATHeter PRN Chalino Walker MD   10 mL at 01/02/21 1230       OBJECTIVE:  Visit Vitals  /82 (BP 1 Location: Right arm, BP Patient Position: Sitting)   Pulse (!) 112   Temp 98.2 °F (36.8 °C)   Resp 16   Wt 130 lb 12.8 oz (59.3 kg)   SpO2 99%   BMI 25.55 kg/m²       Physical Exam:  Constitutional: Well developed, well nourished female in no acute distress, sitting comfortably in the examination room chair. Appears fatigued. HEENT: Normocephalic and atraumatic. Sclerae anicteric. Neck supple without JVD. No thyromegaly present. Skin Warm and dry. No bruising and no rash noted. No erythema. No pallor. Respiratory Lungs are clear to auscultation bilaterally without wheezes, rales or rhonchi, normal air exchange without accessory muscle use. CVS Mildly tachycardic rate, regular rhythm and normal S1 and S2. No murmurs, gallops, or rubs. Abdomen Soft, nontender and nondistended, normoactive bowel sounds. No palpable mass. No hepatosplenomegaly. Neuro Grossly nonfocal with no obvious sensory or motor deficits. MSK Normal range of motion in general.  No edema and no tenderness. Psych Appropriate mood and affect.       Labs:  Recent Results (from the past 24 hour(s))   CBC WITH AUTOMATED DIFF    Collection Time: 01/03/21  8:17 AM   Result Value Ref Range    WBC 2.0 (LL) 4.3 - 11.1 K/uL    RBC 3.69 (L) 4.05 - 5.25 M/uL    HGB 11.6 (L) 11.7 - 15.4 g/dL    HCT 35.9 35.8 - 46.3 %    MCV 97.3 79.6 - 97.8 FL    MCH 31.4 26.1 - 32.9 PG    MCHC 32.3 31.4 - 35.0 g/dL    RDW 14.3 11.9 - 14.6 %    PLATELET 276 (L) 150 - 450 K/uL    MPV 9.9 9.4 - 12.3 FL    ABSOLUTE NRBC 0.00 0.0 - 0.2 K/uL    NEUTROPHILS 89 (H) 43 - 78 %    LYMPHOCYTES 6 (L) 13 - 44 %    MONOCYTES 1 (L) 4.0 - 12.0 %    EOSINOPHILS 3 0.5 - 7.8 %    BASOPHILS 0 0.0 - 2.0 %    IMMATURE GRANULOCYTES 1 0.0 - 5.0 %    ABS. NEUTROPHILS 1.8 1.7 - 8.2 K/UL    ABS. LYMPHOCYTES 0.1 (L) 0.5 - 4.6 K/UL    ABS. MONOCYTES 0.0 (L) 0.1 - 1.3 K/UL    ABS. EOSINOPHILS 0.1 0.0 - 0.8 K/UL    ABS. BASOPHILS 0.0 0.0 - 0.2 K/UL    ABS. IMM. GRANS. 0.0 0.0 - 0.5 K/UL    DF AUTOMATED     METABOLIC PANEL, BASIC    Collection Time: 01/03/21  8:17 AM   Result Value Ref Range    Sodium 140 136 - 145 mmol/L    Potassium 3.4 (L) 3.5 - 5.1 mmol/L    Chloride 108 (H) 98 - 107 mmol/L    CO2 25 21 - 32 mmol/L    Anion gap 7 7 - 16 mmol/L    Glucose 124 (H) 65 - 100 mg/dL    BUN 14 8 - 23 MG/DL    Creatinine 0.70 0.6 - 1.0 MG/DL    GFR est AA >60 >60 ml/min/1.73m2    GFR est non-AA >60 >60 ml/min/1.73m2    Calcium 8.7 8.3 - 10.4 MG/DL        Imaging:  No procedure found. ASSESSMENT:  Patient Active Problem List   Diagnosis Code    Essential hypertension I10    Hyperlipidemia E78.5    Left hip pain M25.552    GERD (gastroesophageal reflux disease) K21.9    Multiple myeloma not having achieved remission ([facility] [address] C90.00          PLAN:  MM: June 2020, s/p 4 cycles RVd with LA, now undergoing autologous stem cell transplant with melphalan conditioning, D0 12/30/2020. Here for infusion visit, D+4 autoSCT. Feeling OK. Ativan did help nausea but made her very sleepy. Starting with some diarrhea yesterday as well. Appetite remains poor. Continues Zofran/Compazine ATC at home. No pain. Fatigue unchanged. No fevers or infectious symptoms. Labs reviewed and unremarkable. Hgb stable. WBC dropping consistent with expected transplant course, platelets still adequate. Continue supportive care, IV fluids, KCL today for hypokalemia 3.4.   She can try to take a half dose of Ativan to see if this helps with nausea without causing excess sedation. Monitor diarrhea, Imodium PRN. She will call with any more concerning symptoms. Follow-up daily for transplant assessment.             Kurt Caceres MD  [facility]  [address] [place] [address]  Office : [phone]  Fax : [phone]

## 2021-01-03 NOTE — PROGRESS NOTES
Diagnosis: Multiple Myeloma  Transplant Date: 12/30/2020  Day: (+/-) Day +4  Chemo regimen used: Melphalan   Labs needed at next visit: CBC with Differential, CMP, GGT, LDH, Magnesium and Phosporus  Labs not resulted that need follow-up: none  Next Appointment scheduled: 1/4/2021 at 8am.  BMT assessments and toxicities completed. WBC/ANC=2.0/1.8  Prophylactic medications started Day +1 (12/31/2020)  Granix not started. Day +6. Toxicities greater than 2 :none  Bactrim or equivalent not initiated yet  Patient seen by Dr. Charlane Dakins at chairside. New orders received: Patient instructed to begin taking 0.5 mg of Ativan rather than entire 1mg tab (She was extremely drowsy with 1 mg dose). Issues: Pt still with hiccups, indigestion and nausea (prn Zofran administered during visit). Per Dr. Charlane Dakins, pt aware she is to take PRN antiemetics more frequently to help prevent nausea. . Pt also encouraged to eat small frequent meals. 40 mg of IV Pepcid administered as ordered. Dr. Charlane Dakins sending prescription to patient's pharmacy in [facility] for PO Ativan to help with nausea. Patient is now taking immodium. She had four loose stools overnight. She states that Immodium is helping.  Pt discharged ambulatory with spouse.

## 2021-01-04 ENCOUNTER — HOSPITAL ENCOUNTER (OUTPATIENT)
Dept: INFUSION THERAPY | Age: 61
Discharge: HOME | End: 2021-01-04
Payer: COMMERCIAL

## 2021-01-04 VITALS
SYSTOLIC BLOOD PRESSURE: 128 MMHG | OXYGEN SATURATION: 99 % | RESPIRATION RATE: 16 BRPM | BODY MASS INDEX: 25.47 KG/M2 | DIASTOLIC BLOOD PRESSURE: 78 MMHG | TEMPERATURE: 96.9 F | HEART RATE: 127 BPM | WEIGHT: 130.4 LBS

## 2021-01-04 DIAGNOSIS — C90.00 MULTIPLE MYELOMA NOT HAVING ACHIEVED REMISSION (HCC): Primary | ICD-10-CM

## 2021-01-04 LAB
ALBUMIN SERPL-MCNC: 3.2 G/DL (ref 3.2–4.6)
ALBUMIN/GLOB SERPL: 1.1 {RATIO} (ref 1.2–3.5)
ALP SERPL-CCNC: 59 U/L (ref 50–136)
ALT SERPL-CCNC: 32 U/L (ref 12–65)
ANION GAP SERPL CALC-SCNC: 7 MMOL/L (ref 7–16)
AST SERPL-CCNC: 17 U/L (ref 15–37)
BASOPHILS # BLD: 0 K/UL (ref 0–0.2)
BASOPHILS NFR BLD: 1 % (ref 0–2)
BILIRUB SERPL-MCNC: 0.3 MG/DL (ref 0.2–1.1)
BUN SERPL-MCNC: 11 MG/DL (ref 8–23)
CALCIUM SERPL-MCNC: 8.6 MG/DL (ref 8.3–10.4)
CHLORIDE SERPL-SCNC: 108 MMOL/L (ref 98–107)
CO2 SERPL-SCNC: 25 MMOL/L (ref 21–32)
CREAT SERPL-MCNC: 0.7 MG/DL (ref 0.6–1)
DIFFERENTIAL METHOD BLD: ABNORMAL
EOSINOPHIL # BLD: 0 K/UL (ref 0–0.8)
EOSINOPHIL NFR BLD: 3 % (ref 0.5–7.8)
ERYTHROCYTE [DISTWIDTH] IN BLOOD BY AUTOMATED COUNT: 14.1 % (ref 11.9–14.6)
GGT SERPL-CCNC: 48 U/L (ref 5–55)
GLOBULIN SER CALC-MCNC: 2.9 G/DL (ref 2.3–3.5)
GLUCOSE SERPL-MCNC: 111 MG/DL (ref 65–100)
HCT VFR BLD AUTO: 35.8 % (ref 35.8–46.3)
HGB BLD-MCNC: 11.6 G/DL (ref 11.7–15.4)
IMM GRANULOCYTES # BLD AUTO: 0 K/UL (ref 0–0.5)
IMM GRANULOCYTES NFR BLD AUTO: 1 % (ref 0–5)
LDH SERPL L TO P-CCNC: 161 U/L (ref 110–210)
LYMPHOCYTES # BLD: 0.1 K/UL (ref 0.5–4.6)
LYMPHOCYTES NFR BLD: 11 % (ref 13–44)
MAGNESIUM SERPL-MCNC: 2.1 MG/DL (ref 1.8–2.4)
MCH RBC QN AUTO: 31.5 PG (ref 26.1–32.9)
MCHC RBC AUTO-ENTMCNC: 32.4 G/DL (ref 31.4–35)
MCV RBC AUTO: 97.3 FL (ref 79.6–97.8)
MONOCYTES # BLD: 0 K/UL (ref 0.1–1.3)
MONOCYTES NFR BLD: 1 % (ref 4–12)
NEUTS SEG # BLD: 0.7 K/UL (ref 1.7–8.2)
NEUTS SEG NFR BLD: 83 % (ref 43–78)
NRBC # BLD: 0 K/UL (ref 0–0.2)
PHOSPHATE SERPL-MCNC: 4 MG/DL (ref 2.3–3.7)
PLATELET # BLD AUTO: 98 K/UL (ref 150–450)
PLATELET COMMENTS,PCOM: ABNORMAL
PMV BLD AUTO: 9.2 FL (ref 9.4–12.3)
POTASSIUM SERPL-SCNC: 3.4 MMOL/L (ref 3.5–5.1)
PROT SERPL-MCNC: 6.1 G/DL (ref 6.3–8.2)
RBC # BLD AUTO: 3.68 M/UL (ref 4.05–5.25)
RBC MORPH BLD: ABNORMAL
SODIUM SERPL-SCNC: 140 MMOL/L (ref 136–145)
WBC # BLD AUTO: 0.8 K/UL (ref 4.3–11.1)
WBC MORPH BLD: ABNORMAL

## 2021-01-04 PROCEDURE — 74011000258 HC RX REV CODE- 258: Performed by: INTERNAL MEDICINE

## 2021-01-04 PROCEDURE — 74011250636 HC RX REV CODE- 250/636: Performed by: INTERNAL MEDICINE

## 2021-01-04 PROCEDURE — 96374 THER/PROPH/DIAG INJ IV PUSH: CPT

## 2021-01-04 PROCEDURE — 84100 ASSAY OF PHOSPHORUS: CPT

## 2021-01-04 PROCEDURE — 82977 ASSAY OF GGT: CPT

## 2021-01-04 PROCEDURE — 80053 COMPREHEN METABOLIC PANEL: CPT

## 2021-01-04 PROCEDURE — 96375 TX/PRO/DX INJ NEW DRUG ADDON: CPT

## 2021-01-04 PROCEDURE — 83735 ASSAY OF MAGNESIUM: CPT

## 2021-01-04 PROCEDURE — 83615 LACTATE (LD) (LDH) ENZYME: CPT

## 2021-01-04 PROCEDURE — 96361 HYDRATE IV INFUSION ADD-ON: CPT

## 2021-01-04 PROCEDURE — 85025 COMPLETE CBC W/AUTO DIFF WBC: CPT

## 2021-01-04 RX ORDER — SODIUM CHLORIDE 0.9 % (FLUSH) 0.9 %
10-30 SYRINGE (ML) INJECTION AS NEEDED
Status: CANCELLED | OUTPATIENT
Start: 2021-01-05

## 2021-01-04 RX ORDER — DEXTROSE, SODIUM CHLORIDE, AND POTASSIUM CHLORIDE 5; .45; .15 G/100ML; G/100ML; G/100ML
1000 INJECTION INTRAVENOUS
Status: COMPLETED | OUTPATIENT
Start: 2021-01-04 | End: 2021-01-04

## 2021-01-04 RX ORDER — ONDANSETRON 2 MG/ML
8 INJECTION INTRAMUSCULAR; INTRAVENOUS
Status: COMPLETED | OUTPATIENT
Start: 2021-01-04 | End: 2021-01-04

## 2021-01-04 RX ORDER — SODIUM CHLORIDE 0.9 % (FLUSH) 0.9 %
10 SYRINGE (ML) INJECTION AS NEEDED
Status: DISCONTINUED | OUTPATIENT
Start: 2021-01-04 | End: 2021-01-06 | Stop reason: HOSPADM

## 2021-01-04 RX ADMIN — DEXTROSE, SODIUM CHLORIDE, AND POTASSIUM CHLORIDE 1000 ML: 5; .45; .15 INJECTION INTRAVENOUS at 08:38

## 2021-01-04 RX ADMIN — FAMOTIDINE: 10 INJECTION INTRAVENOUS at 08:23

## 2021-01-04 RX ADMIN — Medication 10 ML: at 08:08

## 2021-01-04 RX ADMIN — Medication 10 ML: at 11:10

## 2021-01-04 RX ADMIN — ONDANSETRON 8 MG: 2 INJECTION INTRAMUSCULAR; INTRAVENOUS at 08:22

## 2021-01-04 NOTE — PROGRESS NOTES
Diagnosis: Multiple Myeloma  Transplant Date: 12/30/2020  Day: (+/-) Day +5  Chemo regimen used: Melphalan   Labs needed at next visit: CBC with Differential, BMP  Labs not resulted that need follow-up: none  Next Appointment scheduled: 1/5/2021 at 8am.  BMT assessments and toxicities completed. WBC/ANC=0.8/0.7  Prophylactic medications started Day +1 (12/31/2020)  Granix not started.   Toxicities greater than 2 :none  Bactrim or equivalent not initiated yet  Patient seen by  at chairside. New orders received: None  Issues: 40 mg of IV Pepcid administered as ordered. PRN Zofran administered for nausea. Pt states diarrhea has subsided. Pt noticed to be very fatigued today. Encouraged pt to maintain adequate hydration. Pt verbalized understanding.  Pt discharged ambulatory with spouse.

## 2021-01-05 ENCOUNTER — HOSPITAL ENCOUNTER (OUTPATIENT)
Dept: INFUSION THERAPY | Age: 61
Discharge: HOME | End: 2021-01-05
Payer: COMMERCIAL

## 2021-01-05 VITALS
WEIGHT: 131.2 LBS | SYSTOLIC BLOOD PRESSURE: 110 MMHG | TEMPERATURE: 97.6 F | HEART RATE: 128 BPM | RESPIRATION RATE: 18 BRPM | BODY MASS INDEX: 25.62 KG/M2 | DIASTOLIC BLOOD PRESSURE: 64 MMHG | OXYGEN SATURATION: 100 %

## 2021-01-05 DIAGNOSIS — C90.00 MULTIPLE MYELOMA NOT HAVING ACHIEVED REMISSION (HCC): Primary | ICD-10-CM

## 2021-01-05 LAB
ANION GAP SERPL CALC-SCNC: 7 MMOL/L (ref 7–16)
BUN SERPL-MCNC: 11 MG/DL (ref 8–23)
CALCIUM SERPL-MCNC: 8.4 MG/DL (ref 8.3–10.4)
CHLORIDE SERPL-SCNC: 110 MMOL/L (ref 98–107)
CO2 SERPL-SCNC: 24 MMOL/L (ref 21–32)
CREAT SERPL-MCNC: 0.7 MG/DL (ref 0.6–1)
DIFFERENTIAL METHOD BLD: ABNORMAL
ERYTHROCYTE [DISTWIDTH] IN BLOOD BY AUTOMATED COUNT: 13.7 % (ref 11.9–14.6)
GLUCOSE SERPL-MCNC: 99 MG/DL (ref 65–100)
HCT VFR BLD AUTO: 34.4 % (ref 35.8–46.3)
HGB BLD-MCNC: 11.1 G/DL (ref 11.7–15.4)
MCH RBC QN AUTO: 31 PG (ref 26.1–32.9)
MCHC RBC AUTO-ENTMCNC: 32.3 G/DL (ref 31.4–35)
MCV RBC AUTO: 96.1 FL (ref 79.6–97.8)
NRBC # BLD: 0 K/UL (ref 0–0.2)
PLATELET # BLD AUTO: 64 K/UL (ref 150–450)
PLATELET COMMENTS,PCOM: ABNORMAL
PMV BLD AUTO: 8.9 FL (ref 9.4–12.3)
POTASSIUM SERPL-SCNC: 3.4 MMOL/L (ref 3.5–5.1)
RBC # BLD AUTO: 3.58 M/UL (ref 4.05–5.25)
RBC MORPH BLD: ABNORMAL
RBC MORPH BLD: ABNORMAL
SODIUM SERPL-SCNC: 141 MMOL/L (ref 136–145)
WBC # BLD AUTO: 0.2 K/UL (ref 4.3–11.1)
WBC MORPH BLD: ABNORMAL

## 2021-01-05 PROCEDURE — 85025 COMPLETE CBC W/AUTO DIFF WBC: CPT

## 2021-01-05 PROCEDURE — 80048 BASIC METABOLIC PNL TOTAL CA: CPT

## 2021-01-05 PROCEDURE — 96372 THER/PROPH/DIAG INJ SC/IM: CPT

## 2021-01-05 PROCEDURE — 96375 TX/PRO/DX INJ NEW DRUG ADDON: CPT

## 2021-01-05 PROCEDURE — 74011000258 HC RX REV CODE- 258: Performed by: INTERNAL MEDICINE

## 2021-01-05 PROCEDURE — 96374 THER/PROPH/DIAG INJ IV PUSH: CPT

## 2021-01-05 PROCEDURE — 74011250636 HC RX REV CODE- 250/636: Performed by: INTERNAL MEDICINE

## 2021-01-05 PROCEDURE — 96361 HYDRATE IV INFUSION ADD-ON: CPT

## 2021-01-05 RX ORDER — DEXTROSE, SODIUM CHLORIDE, AND POTASSIUM CHLORIDE 5; .45; .15 G/100ML; G/100ML; G/100ML
1000 INJECTION INTRAVENOUS
Status: COMPLETED | OUTPATIENT
Start: 2021-01-05 | End: 2021-01-05

## 2021-01-05 RX ORDER — FAMOTIDINE 10 MG/ML
40 INJECTION INTRAVENOUS DAILY
Status: DISCONTINUED | OUTPATIENT
Start: 2021-01-05 | End: 2021-01-05 | Stop reason: CLARIF

## 2021-01-05 RX ORDER — ONDANSETRON 2 MG/ML
8 INJECTION INTRAMUSCULAR; INTRAVENOUS
Status: COMPLETED | OUTPATIENT
Start: 2021-01-05 | End: 2021-01-05

## 2021-01-05 RX ORDER — SODIUM CHLORIDE 0.9 % (FLUSH) 0.9 %
10-30 SYRINGE (ML) INJECTION AS NEEDED
Status: DISCONTINUED | OUTPATIENT
Start: 2021-01-05 | End: 2021-01-07 | Stop reason: HOSPADM

## 2021-01-05 RX ADMIN — Medication 10 ML: at 10:19

## 2021-01-05 RX ADMIN — Medication 10 ML: at 08:00

## 2021-01-05 RX ADMIN — TBO-FILGRASTIM 300 MCG: 300 INJECTION, SOLUTION SUBCUTANEOUS at 08:15

## 2021-01-05 RX ADMIN — DEXTROSE, SODIUM CHLORIDE, AND POTASSIUM CHLORIDE 1000 ML: 5; .45; .15 INJECTION INTRAVENOUS at 08:15

## 2021-01-05 RX ADMIN — FAMOTIDINE 40 MG: 10 INJECTION INTRAVENOUS at 08:01

## 2021-01-05 RX ADMIN — ONDANSETRON 8 MG: 2 INJECTION INTRAMUSCULAR; INTRAVENOUS at 10:16

## 2021-01-06 ENCOUNTER — HOSPITAL ENCOUNTER (OUTPATIENT)
Dept: INFUSION THERAPY | Age: 61
Discharge: HOME | End: 2021-01-06
Payer: COMMERCIAL

## 2021-01-06 VITALS
BODY MASS INDEX: 25.62 KG/M2 | DIASTOLIC BLOOD PRESSURE: 94 MMHG | SYSTOLIC BLOOD PRESSURE: 148 MMHG | OXYGEN SATURATION: 100 % | WEIGHT: 131.2 LBS | TEMPERATURE: 98 F | HEART RATE: 106 BPM | RESPIRATION RATE: 18 BRPM

## 2021-01-06 DIAGNOSIS — C90.00 MULTIPLE MYELOMA NOT HAVING ACHIEVED REMISSION (HCC): Primary | ICD-10-CM

## 2021-01-06 LAB
ALBUMIN SERPL-MCNC: 3 G/DL (ref 3.2–4.6)
ALBUMIN/GLOB SERPL: 1 {RATIO} (ref 1.2–3.5)
ALP SERPL-CCNC: 70 U/L (ref 50–136)
ALT SERPL-CCNC: 40 U/L (ref 12–65)
ANION GAP SERPL CALC-SCNC: 9 MMOL/L (ref 7–16)
AST SERPL-CCNC: 15 U/L (ref 15–37)
BASOPHILS # BLD: 0 K/UL (ref 0–0.2)
BASOPHILS NFR BLD: 0 % (ref 0–2)
BILIRUB SERPL-MCNC: 0.3 MG/DL (ref 0.2–1.1)
BUN SERPL-MCNC: 9 MG/DL (ref 8–23)
CALCIUM SERPL-MCNC: 8 MG/DL (ref 8.3–10.4)
CHLORIDE SERPL-SCNC: 110 MMOL/L (ref 98–107)
CO2 SERPL-SCNC: 23 MMOL/L (ref 21–32)
CREAT SERPL-MCNC: 0.7 MG/DL (ref 0.6–1)
DIFFERENTIAL METHOD BLD: ABNORMAL
EOSINOPHIL # BLD: 0 K/UL (ref 0–0.8)
EOSINOPHIL NFR BLD: 0 % (ref 0.5–7.8)
ERYTHROCYTE [DISTWIDTH] IN BLOOD BY AUTOMATED COUNT: 13.8 % (ref 11.9–14.6)
GGT SERPL-CCNC: 51 U/L (ref 5–55)
GLOBULIN SER CALC-MCNC: 3 G/DL (ref 2.3–3.5)
GLUCOSE SERPL-MCNC: 92 MG/DL (ref 65–100)
HCT VFR BLD AUTO: 33.1 % (ref 35.8–46.3)
HGB BLD-MCNC: 10.6 G/DL (ref 11.7–15.4)
IMM GRANULOCYTES # BLD AUTO: 0 K/UL (ref 0–0.5)
IMM GRANULOCYTES NFR BLD AUTO: 0 % (ref 0–5)
LDH SERPL L TO P-CCNC: 153 U/L (ref 110–210)
LYMPHOCYTES # BLD: 0.1 K/UL (ref 0.5–4.6)
LYMPHOCYTES NFR BLD: 71 % (ref 13–44)
MAGNESIUM SERPL-MCNC: 2.1 MG/DL (ref 1.8–2.4)
MCH RBC QN AUTO: 30.8 PG (ref 26.1–32.9)
MCHC RBC AUTO-ENTMCNC: 32 G/DL (ref 31.4–35)
MCV RBC AUTO: 96.2 FL (ref 79.6–97.8)
MONOCYTES # BLD: 0 K/UL (ref 0.1–1.3)
MONOCYTES NFR BLD: 14 % (ref 4–12)
NEUTS SEG # BLD: 0 K/UL (ref 1.7–8.2)
NEUTS SEG NFR BLD: 14 % (ref 43–78)
NRBC # BLD: 0 K/UL (ref 0–0.2)
PHOSPHATE SERPL-MCNC: 3.1 MG/DL (ref 2.3–3.7)
PLATELET # BLD AUTO: 40 K/UL (ref 150–450)
PMV BLD AUTO: 9.7 FL (ref 9.4–12.3)
POTASSIUM SERPL-SCNC: 3.4 MMOL/L (ref 3.5–5.1)
PROT SERPL-MCNC: 6 G/DL (ref 6.3–8.2)
RBC # BLD AUTO: 3.44 M/UL (ref 4.05–5.25)
SODIUM SERPL-SCNC: 142 MMOL/L (ref 136–145)
WBC # BLD AUTO: 0.1 K/UL (ref 4.3–11.1)

## 2021-01-06 PROCEDURE — 96372 THER/PROPH/DIAG INJ SC/IM: CPT

## 2021-01-06 PROCEDURE — 82977 ASSAY OF GGT: CPT

## 2021-01-06 PROCEDURE — 74011250636 HC RX REV CODE- 250/636: Performed by: INTERNAL MEDICINE

## 2021-01-06 PROCEDURE — 83615 LACTATE (LD) (LDH) ENZYME: CPT

## 2021-01-06 PROCEDURE — 83735 ASSAY OF MAGNESIUM: CPT

## 2021-01-06 PROCEDURE — 85025 COMPLETE CBC W/AUTO DIFF WBC: CPT

## 2021-01-06 PROCEDURE — 80053 COMPREHEN METABOLIC PANEL: CPT

## 2021-01-06 PROCEDURE — 74011000258 HC RX REV CODE- 258: Performed by: INTERNAL MEDICINE

## 2021-01-06 PROCEDURE — 96375 TX/PRO/DX INJ NEW DRUG ADDON: CPT

## 2021-01-06 PROCEDURE — 96374 THER/PROPH/DIAG INJ IV PUSH: CPT

## 2021-01-06 PROCEDURE — 84100 ASSAY OF PHOSPHORUS: CPT

## 2021-01-06 RX ORDER — SODIUM CHLORIDE 0.9 % (FLUSH) 0.9 %
10 SYRINGE (ML) INJECTION AS NEEDED
Status: DISCONTINUED | OUTPATIENT
Start: 2021-01-06 | End: 2021-01-08 | Stop reason: HOSPADM

## 2021-01-06 RX ORDER — ONDANSETRON 2 MG/ML
8 INJECTION INTRAMUSCULAR; INTRAVENOUS
Status: COMPLETED | OUTPATIENT
Start: 2021-01-06 | End: 2021-01-06

## 2021-01-06 RX ORDER — DEXTROSE, SODIUM CHLORIDE, AND POTASSIUM CHLORIDE 5; .45; .15 G/100ML; G/100ML; G/100ML
1000 INJECTION INTRAVENOUS
Status: COMPLETED | OUTPATIENT
Start: 2021-01-06 | End: 2021-01-06

## 2021-01-06 RX ADMIN — FAMOTIDINE 40 MG: 10 INJECTION INTRAVENOUS at 09:10

## 2021-01-06 RX ADMIN — ONDANSETRON 8 MG: 2 INJECTION INTRAMUSCULAR; INTRAVENOUS at 08:24

## 2021-01-06 RX ADMIN — Medication 10 ML: at 10:26

## 2021-01-06 RX ADMIN — TBO-FILGRASTIM 300 MCG: 300 INJECTION, SOLUTION SUBCUTANEOUS at 08:29

## 2021-01-06 RX ADMIN — DEXTROSE, SODIUM CHLORIDE, AND POTASSIUM CHLORIDE 1000 ML: 5; .45; .15 INJECTION INTRAVENOUS at 08:25

## 2021-01-06 NOTE — PROGRESS NOTES
Diagnosis: Myeloma  Transplant Date: 12/30  Day: (+/-) Day +7  Chemo regimen used: Melphalan  Labs needed at next visit: CBC, BMP  Labs not resulted that need follow-up: none  Next Appointment scheduled: 1/7 @ 0800  BMT assessments and toxicities completed. WBC/ANC= 0.1/0.0  Prophylactic medications started on Day +1. Granix started on 1/5   Toxicities greater than 2 :none  Bactrim or equivalent to start on day +30   Patient seen by Dr. Zelalem Bear orders received: none  Issues:  Pt arrived ambulatory. 1 liter IVF with 20 KCL infused. Pepcid, Zofran and Granix given as ordered. Pt has one mouth sore to right inner cheek. She was instructed to continue mouth care at home. Pt instructed to call immediately with temp 100.5 or higher or chills. Pt discharged ambulatory, no distress noted.

## 2021-01-07 ENCOUNTER — HOSPITAL ENCOUNTER (OUTPATIENT)
Dept: INFUSION THERAPY | Age: 61
Discharge: HOME | End: 2021-01-07
Payer: COMMERCIAL

## 2021-01-07 VITALS
WEIGHT: 132.2 LBS | DIASTOLIC BLOOD PRESSURE: 73 MMHG | OXYGEN SATURATION: 99 % | HEART RATE: 108 BPM | TEMPERATURE: 98.5 F | SYSTOLIC BLOOD PRESSURE: 125 MMHG | BODY MASS INDEX: 25.82 KG/M2 | RESPIRATION RATE: 18 BRPM

## 2021-01-07 DIAGNOSIS — C90.00 MULTIPLE MYELOMA NOT HAVING ACHIEVED REMISSION (HCC): Primary | ICD-10-CM

## 2021-01-07 LAB
ANION GAP SERPL CALC-SCNC: 6 MMOL/L (ref 7–16)
BUN SERPL-MCNC: 7 MG/DL (ref 8–23)
CALCIUM SERPL-MCNC: 7.7 MG/DL (ref 8.3–10.4)
CHLORIDE SERPL-SCNC: 110 MMOL/L (ref 98–107)
CO2 SERPL-SCNC: 25 MMOL/L (ref 21–32)
CREAT SERPL-MCNC: 0.6 MG/DL (ref 0.6–1)
DIFFERENTIAL METHOD BLD: ABNORMAL
ERYTHROCYTE [DISTWIDTH] IN BLOOD BY AUTOMATED COUNT: 13.4 % (ref 11.9–14.6)
GLUCOSE SERPL-MCNC: 107 MG/DL (ref 65–100)
HCT VFR BLD AUTO: 30.8 % (ref 35.8–46.3)
HGB BLD-MCNC: 9.9 G/DL (ref 11.7–15.4)
MAGNESIUM SERPL-MCNC: 1.9 MG/DL (ref 1.8–2.4)
MCH RBC QN AUTO: 31 PG (ref 26.1–32.9)
MCHC RBC AUTO-ENTMCNC: 32.1 G/DL (ref 31.4–35)
MCV RBC AUTO: 96.6 FL (ref 79.6–97.8)
NRBC # BLD: 0 K/UL (ref 0–0.2)
PLATELET # BLD AUTO: 21 K/UL (ref 150–450)
PLATELET COMMENTS,PCOM: ABNORMAL
PMV BLD AUTO: 8.2 FL (ref 9.4–12.3)
POTASSIUM SERPL-SCNC: 3.4 MMOL/L (ref 3.5–5.1)
RBC # BLD AUTO: 3.19 M/UL (ref 4.05–5.25)
RBC MORPH BLD: ABNORMAL
SODIUM SERPL-SCNC: 141 MMOL/L (ref 136–145)
WBC # BLD AUTO: 0.1 K/UL (ref 4.3–11.1)
WBC MORPH BLD: ABNORMAL

## 2021-01-07 PROCEDURE — 96360 HYDRATION IV INFUSION INIT: CPT

## 2021-01-07 PROCEDURE — 96361 HYDRATE IV INFUSION ADD-ON: CPT

## 2021-01-07 PROCEDURE — 85025 COMPLETE CBC W/AUTO DIFF WBC: CPT

## 2021-01-07 PROCEDURE — 83735 ASSAY OF MAGNESIUM: CPT

## 2021-01-07 PROCEDURE — 96372 THER/PROPH/DIAG INJ SC/IM: CPT

## 2021-01-07 PROCEDURE — 74011250636 HC RX REV CODE- 250/636: Performed by: INTERNAL MEDICINE

## 2021-01-07 PROCEDURE — 80048 BASIC METABOLIC PNL TOTAL CA: CPT

## 2021-01-07 RX ORDER — DEXTROSE, SODIUM CHLORIDE, AND POTASSIUM CHLORIDE 5; .45; .15 G/100ML; G/100ML; G/100ML
1000 INJECTION INTRAVENOUS
Status: COMPLETED | OUTPATIENT
Start: 2021-01-07 | End: 2021-01-07

## 2021-01-07 RX ORDER — SODIUM CHLORIDE 0.9 % (FLUSH) 0.9 %
20-40 SYRINGE (ML) INJECTION EVERY 8 HOURS
Status: DISCONTINUED | OUTPATIENT
Start: 2021-01-07 | End: 2021-01-09 | Stop reason: HOSPADM

## 2021-01-07 RX ORDER — SODIUM CHLORIDE 0.9 % (FLUSH) 0.9 %
10-40 SYRINGE (ML) INJECTION EVERY 8 HOURS
Status: CANCELLED | OUTPATIENT
Start: 2021-01-07

## 2021-01-07 RX ADMIN — SODIUM CHLORIDE 1000 ML: 900 INJECTION, SOLUTION INTRAVENOUS at 08:15

## 2021-01-07 RX ADMIN — Medication 10 ML: at 10:46

## 2021-01-07 RX ADMIN — TBO-FILGRASTIM 300 MCG: 300 INJECTION, SOLUTION SUBCUTANEOUS at 10:30

## 2021-01-07 RX ADMIN — DEXTROSE, SODIUM CHLORIDE, AND POTASSIUM CHLORIDE 1000 ML: 5; .45; .15 INJECTION INTRAVENOUS at 08:29

## 2021-01-07 NOTE — PROGRESS NOTES
Diagnosis: Myeloma  Transplant Date: 12/30  Day: (+/-) Day +8  Chemo regimen used: Melphalan  Labs needed at next visit: CBC, BMP, phos, ggt, mag, ldh  Labs not resulted that need follow-up: none  Next Appointment scheduled: 1/8 @ 0800  BMT assessments and toxicities completed. WBC/ANC= 0.1/0.0  Prophylactic medications started on Day +1. Granix started on 1/5   Toxicities greater than 2 :none  Bactrim or equivalent to start on day +30   Patient seen by Dr. Trinidad Mora orders received: none  Issues: Diarrhea  Pt arrived ambulatory. 1lt d5.45 with 20 KCL and 1lt NS infused. Granix given as ordered. Pt still with one mouth sore to right inner cheek. She was instructed to continue mouth care at home. Pt instructed to call immediately with temp 100.5 or higher or chills.   Pt discharged ambulatory, no distress noted

## 2021-01-08 ENCOUNTER — HOSPITAL ENCOUNTER (INPATIENT)
Age: 61
LOS: 9 days | Discharge: HOME | DRG: 809 | End: 2021-01-17
Attending: INTERNAL MEDICINE | Admitting: INTERNAL MEDICINE
Payer: COMMERCIAL

## 2021-01-08 ENCOUNTER — HOSPITAL ENCOUNTER (OUTPATIENT)
Dept: INFUSION THERAPY | Age: 61
Discharge: HOME | End: 2021-01-08
Payer: COMMERCIAL

## 2021-01-08 ENCOUNTER — APPOINTMENT (OUTPATIENT)
Dept: GENERAL RADIOLOGY | Age: 61
DRG: 809 | End: 2021-01-08
Attending: NURSE PRACTITIONER
Payer: COMMERCIAL

## 2021-01-08 ENCOUNTER — PATIENT OUTREACH (OUTPATIENT)
Dept: CASE MANAGEMENT | Age: 61
End: 2021-01-08

## 2021-01-08 VITALS
DIASTOLIC BLOOD PRESSURE: 75 MMHG | OXYGEN SATURATION: 98 % | TEMPERATURE: 99.2 F | WEIGHT: 134 LBS | RESPIRATION RATE: 18 BRPM | HEART RATE: 106 BPM | BODY MASS INDEX: 26.17 KG/M2 | SYSTOLIC BLOOD PRESSURE: 131 MMHG

## 2021-01-08 DIAGNOSIS — C90.00 MULTIPLE MYELOMA NOT HAVING ACHIEVED REMISSION (HCC): ICD-10-CM

## 2021-01-08 DIAGNOSIS — I82.401 DEEP VEIN THROMBOSIS (DVT) OF RIGHT LOWER EXTREMITY, UNSPECIFIED CHRONICITY, UNSPECIFIED VEIN (HCC): ICD-10-CM

## 2021-01-08 DIAGNOSIS — C90.00 MULTIPLE MYELOMA NOT HAVING ACHIEVED REMISSION (HCC): Primary | ICD-10-CM

## 2021-01-08 DIAGNOSIS — D70.9 NEUTROPENIC FEVER (HCC): ICD-10-CM

## 2021-01-08 DIAGNOSIS — Z94.81 S/P AUTOLOGOUS BONE MARROW TRANSPLANTATION (HCC): ICD-10-CM

## 2021-01-08 DIAGNOSIS — R19.7 DIARRHEA, UNSPECIFIED TYPE: ICD-10-CM

## 2021-01-08 DIAGNOSIS — R50.81 NEUTROPENIC FEVER (HCC): ICD-10-CM

## 2021-01-08 LAB
ALBUMIN SERPL-MCNC: 2.6 G/DL (ref 3.2–4.6)
ALBUMIN/GLOB SERPL: 0.9 {RATIO} (ref 1.2–3.5)
ALP SERPL-CCNC: 64 U/L (ref 50–136)
ALT SERPL-CCNC: 23 U/L (ref 12–65)
ANION GAP SERPL CALC-SCNC: 7 MMOL/L (ref 7–16)
APPEARANCE UR: CLEAR
AST SERPL-CCNC: 11 U/L (ref 15–37)
BACTERIA URNS QL MICRO: 0 /HPF
BILIRUB SERPL-MCNC: 0.2 MG/DL (ref 0.2–1.1)
BILIRUB UR QL: NEGATIVE
BUN SERPL-MCNC: 5 MG/DL (ref 8–23)
CALCIUM SERPL-MCNC: 7.7 MG/DL (ref 8.3–10.4)
CASTS URNS QL MICRO: 0 /LPF
CHLORIDE SERPL-SCNC: 111 MMOL/L (ref 98–107)
CO2 SERPL-SCNC: 24 MMOL/L (ref 21–32)
COLOR UR: ABNORMAL
CREAT SERPL-MCNC: 0.5 MG/DL (ref 0.6–1)
CRYSTALS URNS QL MICRO: 0 /LPF
DIFFERENTIAL METHOD BLD: ABNORMAL
EPI CELLS #/AREA URNS HPF: 0 /HPF
ERYTHROCYTE [DISTWIDTH] IN BLOOD BY AUTOMATED COUNT: 13.2 % (ref 11.9–14.6)
GGT SERPL-CCNC: 44 U/L (ref 5–55)
GLOBULIN SER CALC-MCNC: 2.9 G/DL (ref 2.3–3.5)
GLUCOSE SERPL-MCNC: 89 MG/DL (ref 65–100)
GLUCOSE UR STRIP.AUTO-MCNC: NEGATIVE MG/DL
HCT VFR BLD AUTO: 28.4 % (ref 35.8–46.3)
HGB BLD-MCNC: 9.3 G/DL (ref 11.7–15.4)
HGB UR QL STRIP: ABNORMAL
KETONES UR QL STRIP.AUTO: NEGATIVE MG/DL
LDH SERPL L TO P-CCNC: 133 U/L (ref 110–210)
LEUKOCYTE ESTERASE UR QL STRIP.AUTO: NEGATIVE
MAGNESIUM SERPL-MCNC: 2 MG/DL (ref 1.8–2.4)
MCH RBC QN AUTO: 30.9 PG (ref 26.1–32.9)
MCHC RBC AUTO-ENTMCNC: 32.7 G/DL (ref 31.4–35)
MCV RBC AUTO: 94.4 FL (ref 79.6–97.8)
MUCOUS THREADS URNS QL MICRO: 0 /LPF
NITRITE UR QL STRIP.AUTO: NEGATIVE
NRBC # BLD: 0 K/UL (ref 0–0.2)
PH UR STRIP: 6 [PH] (ref 5–9)
PHOSPHATE SERPL-MCNC: 3.2 MG/DL (ref 2.3–3.7)
PLATELET # BLD AUTO: 9 K/UL (ref 150–450)
PLATELET COMMENTS,PCOM: ABNORMAL
PMV BLD AUTO: 9.7 FL (ref 9.4–12.3)
POTASSIUM SERPL-SCNC: 3.5 MMOL/L (ref 3.5–5.1)
PROT SERPL-MCNC: 5.5 G/DL (ref 6.3–8.2)
PROT UR STRIP-MCNC: NEGATIVE MG/DL
RBC # BLD AUTO: 3.01 M/UL (ref 4.05–5.25)
RBC #/AREA URNS HPF: NORMAL /HPF
RBC MORPH BLD: ABNORMAL
SODIUM SERPL-SCNC: 142 MMOL/L (ref 136–145)
SP GR UR REFRACTOMETRY: 1.01 (ref 1–1.02)
UROBILINOGEN UR QL STRIP.AUTO: 0.2 EU/DL (ref 0.2–1)
WBC # BLD AUTO: 0.1 K/UL (ref 4.3–11.1)
WBC MORPH BLD: ABNORMAL
WBC URNS QL MICRO: 0 /HPF

## 2021-01-08 PROCEDURE — 74011250637 HC RX REV CODE- 250/637: Performed by: INTERNAL MEDICINE

## 2021-01-08 PROCEDURE — 74011000250 HC RX REV CODE- 250: Performed by: INTERNAL MEDICINE

## 2021-01-08 PROCEDURE — 82977 ASSAY OF GGT: CPT

## 2021-01-08 PROCEDURE — 2709999900 HC NON-CHARGEABLE SUPPLY

## 2021-01-08 PROCEDURE — 80053 COMPREHEN METABOLIC PANEL: CPT

## 2021-01-08 PROCEDURE — 86644 CMV ANTIBODY: CPT

## 2021-01-08 PROCEDURE — 83735 ASSAY OF MAGNESIUM: CPT

## 2021-01-08 PROCEDURE — 74011250636 HC RX REV CODE- 250/636: Performed by: INTERNAL MEDICINE

## 2021-01-08 PROCEDURE — 77030018667 ADMN ST IV BLD FENW -A

## 2021-01-08 PROCEDURE — 96361 HYDRATE IV INFUSION ADD-ON: CPT

## 2021-01-08 PROCEDURE — 96372 THER/PROPH/DIAG INJ SC/IM: CPT

## 2021-01-08 PROCEDURE — 99211 OFF/OP EST MAY X REQ PHY/QHP: CPT

## 2021-01-08 PROCEDURE — 96365 THER/PROPH/DIAG IV INF INIT: CPT

## 2021-01-08 PROCEDURE — 85025 COMPLETE CBC W/AUTO DIFF WBC: CPT

## 2021-01-08 PROCEDURE — 81003 URINALYSIS AUTO W/O SCOPE: CPT

## 2021-01-08 PROCEDURE — 99222 1ST HOSP IP/OBS MODERATE 55: CPT | Performed by: INTERNAL MEDICINE

## 2021-01-08 PROCEDURE — 87040 BLOOD CULTURE FOR BACTERIA: CPT

## 2021-01-08 PROCEDURE — 74011250636 HC RX REV CODE- 250/636: Performed by: NURSE PRACTITIONER

## 2021-01-08 PROCEDURE — 81015 MICROSCOPIC EXAM OF URINE: CPT

## 2021-01-08 PROCEDURE — 87086 URINE CULTURE/COLONY COUNT: CPT

## 2021-01-08 PROCEDURE — 96367 TX/PROPH/DG ADDL SEQ IV INF: CPT

## 2021-01-08 PROCEDURE — 83615 LACTATE (LD) (LDH) ENZYME: CPT

## 2021-01-08 PROCEDURE — 65270000029 HC RM PRIVATE

## 2021-01-08 PROCEDURE — 71046 X-RAY EXAM CHEST 2 VIEWS: CPT

## 2021-01-08 PROCEDURE — 74011000258 HC RX REV CODE- 258: Performed by: INTERNAL MEDICINE

## 2021-01-08 PROCEDURE — 74011250637 HC RX REV CODE- 250/637: Performed by: NURSE PRACTITIONER

## 2021-01-08 PROCEDURE — P9037 PLATE PHERES LEUKOREDU IRRAD: HCPCS

## 2021-01-08 PROCEDURE — 74011000258 HC RX REV CODE- 258: Performed by: NURSE PRACTITIONER

## 2021-01-08 PROCEDURE — 87324 CLOSTRIDIUM AG IA: CPT

## 2021-01-08 PROCEDURE — 84100 ASSAY OF PHOSPHORUS: CPT

## 2021-01-08 PROCEDURE — 77030018836 HC SOL IRR NACL ICUM -A

## 2021-01-08 PROCEDURE — 36430 TRANSFUSION BLD/BLD COMPNT: CPT

## 2021-01-08 RX ORDER — ONDANSETRON 2 MG/ML
4 INJECTION INTRAMUSCULAR; INTRAVENOUS
Status: DISCONTINUED | OUTPATIENT
Start: 2021-01-08 | End: 2021-01-17 | Stop reason: HOSPADM

## 2021-01-08 RX ORDER — HYDROCODONE BITARTRATE AND ACETAMINOPHEN 5; 325 MG/1; MG/1
1 TABLET ORAL
Status: DISCONTINUED | OUTPATIENT
Start: 2021-01-08 | End: 2021-01-17 | Stop reason: HOSPADM

## 2021-01-08 RX ORDER — SODIUM CHLORIDE 9 MG/ML
250 INJECTION, SOLUTION INTRAVENOUS ONCE
Status: COMPLETED | OUTPATIENT
Start: 2021-01-08 | End: 2021-01-08

## 2021-01-08 RX ORDER — SODIUM CHLORIDE 0.9 % (FLUSH) 0.9 %
10-40 SYRINGE (ML) INJECTION EVERY 8 HOURS
Status: DISCONTINUED | OUTPATIENT
Start: 2021-01-08 | End: 2021-01-10 | Stop reason: HOSPADM

## 2021-01-08 RX ORDER — DIPHENHYDRAMINE HCL 25 MG
25 CAPSULE ORAL ONCE
Status: COMPLETED | OUTPATIENT
Start: 2021-01-08 | End: 2021-01-08

## 2021-01-08 RX ORDER — FLUCONAZOLE 100 MG/1
200 TABLET ORAL DAILY
Status: DISCONTINUED | OUTPATIENT
Start: 2021-01-09 | End: 2021-01-11

## 2021-01-08 RX ORDER — ACETAMINOPHEN 325 MG/1
650 TABLET ORAL ONCE
Status: COMPLETED | OUTPATIENT
Start: 2021-01-08 | End: 2021-01-08

## 2021-01-08 RX ORDER — LORAZEPAM 1 MG/1
1 TABLET ORAL
Status: DISCONTINUED | OUTPATIENT
Start: 2021-01-08 | End: 2021-01-17 | Stop reason: HOSPADM

## 2021-01-08 RX ORDER — SODIUM CHLORIDE 9 MG/ML
75 INJECTION, SOLUTION INTRAVENOUS CONTINUOUS
Status: DISCONTINUED | OUTPATIENT
Start: 2021-01-08 | End: 2021-01-11

## 2021-01-08 RX ORDER — MORPHINE SULFATE 2 MG/ML
2 INJECTION, SOLUTION INTRAMUSCULAR; INTRAVENOUS
Status: DISCONTINUED | OUTPATIENT
Start: 2021-01-08 | End: 2021-01-17 | Stop reason: HOSPADM

## 2021-01-08 RX ORDER — DEXTROSE, SODIUM CHLORIDE, AND POTASSIUM CHLORIDE 5; .45; .15 G/100ML; G/100ML; G/100ML
1000 INJECTION INTRAVENOUS
Status: COMPLETED | OUTPATIENT
Start: 2021-01-08 | End: 2021-01-08

## 2021-01-08 RX ORDER — ACYCLOVIR 200 MG/1
400 CAPSULE ORAL 2 TIMES DAILY
Status: DISCONTINUED | OUTPATIENT
Start: 2021-01-08 | End: 2021-01-17 | Stop reason: HOSPADM

## 2021-01-08 RX ORDER — LISINOPRIL 5 MG/1
10 TABLET ORAL DAILY
Status: DISCONTINUED | OUTPATIENT
Start: 2021-01-09 | End: 2021-01-17 | Stop reason: HOSPADM

## 2021-01-08 RX ORDER — TRAMADOL HYDROCHLORIDE 50 MG/1
50 TABLET ORAL
Status: DISCONTINUED | OUTPATIENT
Start: 2021-01-08 | End: 2021-01-17 | Stop reason: HOSPADM

## 2021-01-08 RX ORDER — HEPARIN 100 UNIT/ML
300 SYRINGE INTRAVENOUS AS NEEDED
Status: DISCONTINUED | OUTPATIENT
Start: 2021-01-08 | End: 2021-01-17 | Stop reason: HOSPADM

## 2021-01-08 RX ORDER — PANTOPRAZOLE SODIUM 40 MG/1
40 TABLET, DELAYED RELEASE ORAL
Status: DISCONTINUED | OUTPATIENT
Start: 2021-01-09 | End: 2021-01-17 | Stop reason: HOSPADM

## 2021-01-08 RX ORDER — GABAPENTIN 300 MG/1
300 CAPSULE ORAL 3 TIMES DAILY
Status: DISCONTINUED | OUTPATIENT
Start: 2021-01-08 | End: 2021-01-17 | Stop reason: HOSPADM

## 2021-01-08 RX ORDER — SODIUM CHLORIDE 0.9 % (FLUSH) 0.9 %
10 SYRINGE (ML) INJECTION AS NEEDED
Status: DISCONTINUED | OUTPATIENT
Start: 2021-01-08 | End: 2021-01-17 | Stop reason: HOSPADM

## 2021-01-08 RX ORDER — DEXTROSE MONOHYDRATE AND SODIUM CHLORIDE 5; .45 G/100ML; G/100ML
1000 INJECTION, SOLUTION INTRAVENOUS
Status: CANCELLED | OUTPATIENT
Start: 2021-01-08

## 2021-01-08 RX ADMIN — DIPHENHYDRAMINE HYDROCHLORIDE 25 MG: 25 CAPSULE ORAL at 12:40

## 2021-01-08 RX ADMIN — ACETAMINOPHEN 650 MG: 325 TABLET, FILM COATED ORAL at 12:40

## 2021-01-08 RX ADMIN — Medication 10 ML: at 21:22

## 2021-01-08 RX ADMIN — Medication 10 ML: at 13:45

## 2021-01-08 RX ADMIN — CEFEPIME HYDROCHLORIDE 2 G: 2 INJECTION, POWDER, FOR SOLUTION INTRAVENOUS at 17:13

## 2021-01-08 RX ADMIN — SODIUM CHLORIDE 250 ML: 900 INJECTION, SOLUTION INTRAVENOUS at 13:02

## 2021-01-08 RX ADMIN — GABAPENTIN 300 MG: 300 CAPSULE ORAL at 16:20

## 2021-01-08 RX ADMIN — NYSTATIN 10 ML: 100000 SUSPENSION ORAL at 20:00

## 2021-01-08 RX ADMIN — TBO-FILGRASTIM 300 MCG: 300 INJECTION, SOLUTION SUBCUTANEOUS at 10:42

## 2021-01-08 RX ADMIN — SODIUM CHLORIDE 75 ML/HR: 900 INJECTION, SOLUTION INTRAVENOUS at 16:20

## 2021-01-08 RX ADMIN — GABAPENTIN 300 MG: 300 CAPSULE ORAL at 21:22

## 2021-01-08 RX ADMIN — ACYCLOVIR 400 MG: 200 CAPSULE ORAL at 17:13

## 2021-01-08 RX ADMIN — VANCOMYCIN HYDROCHLORIDE 1000 MG: 1 INJECTION, POWDER, LYOPHILIZED, FOR SOLUTION INTRAVENOUS at 11:10

## 2021-01-08 RX ADMIN — NYSTATIN 10 ML: 100000 SUSPENSION ORAL at 23:03

## 2021-01-08 RX ADMIN — HYDROCODONE BITARTRATE AND ACETAMINOPHEN 1 TABLET: 5; 325 TABLET ORAL at 23:03

## 2021-01-08 RX ADMIN — CEFEPIME HYDROCHLORIDE 2 G: 2 INJECTION, POWDER, FOR SOLUTION INTRAVENOUS at 10:39

## 2021-01-08 RX ADMIN — DEXTROSE, SODIUM CHLORIDE, AND POTASSIUM CHLORIDE 1000 ML: 5; .45; .15 INJECTION INTRAVENOUS at 09:02

## 2021-01-08 NOTE — PROGRESS NOTES
Pharmacokinetic Consult to Pharmacist    Toño Pollack is a 56 y.o. female being treated for febrile neutropenia with vancomycin and cefepime. Weight: 61 kg (134 lb 6.4 oz)  Lab Results   Component Value Date/Time    BUN 5 (L) 01/08/2021 08:45 AM    Creatinine 0.50 (L) 01/08/2021 08:45 AM    WBC 0.1 (LL) 01/08/2021 08:45 AM      CrCl cannot be calculated (Unknown ideal weight.). No results found for: Talitha Baumgarten    Day 1 of vancomycin. Goal trough is 15-20. Dosing started with 1g x 1 and then 750 mg q12h. Will continue to follow patient.       Thank you,  Aline Bender, PharmD, [address] [place] [address]  Clinical Pharmacist  [phone]

## 2021-01-08 NOTE — PROGRESS NOTES
Diagnosis: Multiple Myeloma  Transplant Date: 12/30/2020  Day: (+/-) +9. Chemo regimen used: Melphalan  Labs needed at next visit: see orders. Labs not resulted that need follow-up: Blood cultures x 2, urine culture. Next Appointment scheduled: Patient being admitted to [facility]. BMT assessments and toxicities completed. WBC/ANC= 0.1/0.0. Prophylactic medications started Day +1  Granix started on Day +6 (1/5/2021) and today's dose given  Toxicities greater than 2 :None. Bactrim or equivalent to be initiated on Day +30. Patient seen by MD/NP Dr Ekta Cameron until engraftment. New orders received: yes - admit patient. Issues:  Patient arrived stating that she had fever 100.4 x 2 last night (last at 0300) and patient did not call provider. Dr Ekta Cameron notified and orders received. 1 mouth sore to right inner cheek continues. Patient received  1 liter D5 0.45%NS with 20 mEq KCL over 2 hours, Granix, 1 unit platelets for plt count 9 K, 1 gram IV Vancomycin and 2 grams IV Cefepime. Discharged via wheelchair in stable condition accompanied by . Patient to get admitted to [place] [facility] TRANSFER - OUT REPORT:    Verbal report given to Kalia(name) on Harshal Putnam  being transferred to [address] [place] [address] [address]) for routine progression of care       Report consisted of patients Situation, Background, Assessment and   Recommendations(SBAR). Information from the following report(s) MAR, vital signs, Kardex and recent labs was reviewed with the receiving nurse. Opportunity for questions and clarification was provided.       Patient transported with:    in stable condition

## 2021-01-08 NOTE — PROGRESS NOTES
Problem: Falls - Risk of  Goal: *Absence of Falls  Description: Document Mandi Lorenza Fall Risk and appropriate interventions in the flowsheet.   Outcome: Progressing Towards Goal  Note: Fall Risk Interventions:            Medication Interventions: Evaluate medications/consider consulting pharmacy, Patient to call before getting OOB, Teach patient to arise slowly                   Problem: Patient Education: Go to Patient Education Activity  Goal: Patient/Family Education  Outcome: Progressing Towards Goal

## 2021-01-08 NOTE — PROGRESS NOTES
1/8/21- Patient stated to MD that had a fever over the night but came down with Tylenol so didn't feel the need to contact the office. MD ordered pt to have Blood cultures peripheral and port, urinalysis and urine culture, and to receive IV vancomycin and cefepime. Patient will be admitted today to [facility] 3rd floor room 357. Charge nurse for infusion alerted to added orders and room for pt. Patient notified as well.

## 2021-01-08 NOTE — H&P
Inpatient Hematology / Oncology History and Physical    Reason for Asmission:  Neutropenic fever ([facility] [address] [D70.9, R50.81]    History of Present Illness:  Ms. Shemar Moreno is a 56 y.o. female admitted on 1/8/2021 with a primary diagnosis of There were no encounter diagnoses. .      She has hypo-secretory Lambda light chain, Multiple Myeloma and is s/p Autologous PBHCT Day +9 and has developed febrile neutropenia      Review of Systems:  Constitutional c/o fever and decreased appetite. HEENT Denies trauma, blurry vision, hearing loss, ear pain, nosebleeds, sore throat, neck pain and ear discharge. Skin Denies lesions or rashes. Lungs Denies dyspnea, cough, sputum production or hemoptysis. Cardiovascular Denies chest pain, palpitations, or lower extremity edema. Gastrointestinal Denies nausea, vomiting, changes in bowel habits, bloody or black stools, abdominal pain.  Denies dysuria, frequency or hesitancy of urination. Neuro Denies headaches, visual changes or ataxia. Denies dizziness, tingling, tremors, sensory change, speech change, focal weakness or headaches. Hematology Denies easy bruising or bleeding, denies gingival bleeding or epistaxis. Endo Denies heat/cold intolerance, denies diabetes or thyroid abnormalities. MSK Denies back pain, arthralgias, myalgias or frequent falls. Psychiatric/Behavioral Denies depression and substance abuse. The patient is not nervous/anxious.          Allergies   Allergen Reactions    Erythromycin Nausea Only    Penicillins Rash     Past Medical History:   Diagnosis Date    DVT (deep venous thrombosis) (HCC)     Essential hypertension 3/29/2018    GERD (gastroesophageal reflux disease) 3/29/2018    Hyperlipemia     Hyperlipidemia 3/29/2018    Hypertension     Left hip pain 3/29/2018    Multiple myeloma ([facility] [address] 06/2020     Past Surgical History:   Procedure Laterality Date    HX OTHER SURGICAL      open chest surgey    HX TRANSPLANT Autologous Stem Cell treansplant 12/30/2020    HX TUBAL LIGATION      IR INSERT NON TUNL CVC OVER 5 YRS  12/7/2020    IR INSERT TUNL CVC W PORT OVER 5 YEARS  11/17/2020     Family History   Problem Relation Age of Onset    Breast Cancer Neg Hx     Colon Cancer Neg Hx     Ovarian Cancer Neg Hx      Social History     Socioeconomic History    Marital status:      Spouse name: Not on file    Number of children: Not on file    Years of education: Not on file    Highest education level: Not on file   Occupational History    Not on file   Social Needs    Financial resource strain: Not on file    Food insecurity     Worry: Not on file     Inability: Not on file    Transportation needs     Medical: Not on file     Non-medical: Not on file   Tobacco Use    Smoking status: Never Smoker    Smokeless tobacco: Never Used   Substance and Sexual Activity    Alcohol use: No    Drug use: No    Sexual activity: Yes     Partners: Male     Birth control/protection: Surgical     Comment: tubal   Lifestyle    Physical activity     Days per week: Not on file     Minutes per session: Not on file    Stress: Not on file   Relationships    Social connections     Talks on phone: Not on file     Gets together: Not on file     Attends Druze service: Not on file     Active member of club or organization: Not on file     Attends meetings of clubs or organizations: Not on file     Relationship status: Not on file    Intimate partner violence     Fear of current or ex partner: Not on file     Emotionally abused: Not on file     Physically abused: Not on file     Forced sexual activity: Not on file   Other Topics Concern     Service Not Asked    Blood Transfusions Not Asked    Caffeine Concern Not Asked    Occupational Exposure Not Asked   [place] Hazards Not Asked    Sleep Concern Not Asked    Stress Concern Not Asked    Weight Concern Not Asked    Special Diet Not Asked    Back Care Not Asked    Exercise Not Asked    Bike Helmet Not Asked    Seat Belt Not Asked    Self-Exams Not Asked   Social History Narrative    Not on file     Current Facility-Administered Medications   Medication Dose Route Frequency Provider Last Rate Last Admin    acyclovir (ZOVIRAX) capsule 400 mg  400 mg Oral BID Quincy Cortes NP   400 mg at 01/08/21 1713    [START ON 1/9/2021] pantoprazole (PROTONIX) tablet 40 mg  40 mg Oral ACB Quincy Cortes NP        [START ON 1/9/2021] fluconazole (DIFLUCAN) tablet 200 mg  200 mg Oral DAILY Quincy Sophia, NP        gabapentin (NEURONTIN) capsule 300 mg  300 mg Oral TID Quincy Cortes, NP   300 mg at 01/08/21 1620    [START ON 1/9/2021] lisinopriL (PRINIVIL, ZESTRIL) tablet 10 mg  10 mg Oral DAILY Quincy Sophia, NP        LORazepam (ATIVAN) tablet 1 mg  1 mg Oral Q4H PRN Quincy Sophia, NP        traMADoL Ozzy Loosen) tablet 50 mg  50 mg Oral Q6H PRN Quincy Cortes, NP        0.9% sodium chloride infusion  75 mL/hr IntraVENous CONTINUOUS Quincy Cortes NP 75 mL/hr at 01/08/21 1620 75 mL/hr at 01/08/21 1620    cefepime (MAXIPIME) 2 g in 0.9% sodium chloride (MBP/ADV) 100 mL MBP  2 g IntraVENous Q8H Quincy Cortes,  mL/hr at 01/08/21 1713 2 g at 01/08/21 1713    ondansetron (ZOFRAN) injection 4 mg  4 mg IntraVENous Q4H PRN Quincy Cortes, NP        prochlorperazine (COMPAZINE) with saline injection 10 mg  10 mg IntraVENous Q6H PRN Quincy Cortes, NP        HYDROcodone-acetaminophen (NORCO) 5-325 mg per tablet 1 Tab  1 Tab Oral Q6H PRN Quincy Sophia, NP        morphine injection 2 mg  2 mg IntraVENous Q4H PRN Quincy Sophia, NP        [START ON 1/9/2021] vancomycin (VANCOCIN) 750 mg in 0.9% sodium chloride 250 mL (VIAL-MATE)  750 mg IntraVENous Q12H Preethi Omalley MD         Facility-Administered Medications Ordered in Other Encounters   Medication Dose Route Frequency Provider Last Rate Last Admin    central line flush (saline) syringe 10-40 mL  10-40 mL InterCATHeter Q8H Preethi Omalley MD   10 mL at 21 1345    central line flush (saline) syringe 20-40 mL  20-40 mL InterCATHeter Q8H Preethi Omalley MD   10 mL at 21 1046       OBJECTIVE:  Patient Vitals for the past 8 hrs:   BP Temp Pulse Resp SpO2 Weight   21 1427 139/71 99.6 °F (37.6 °C) (!) 106 18 95 % 134 lb 6.4 oz (61 kg)     Temp (24hrs), Av.1 °F (37.3 °C), Min:98.7 °F (37.1 °C), Max:99.6 °F (37.6 °C)    No intake/output data recorded. Physical Exam:  Constitutional: Well developed, well nourished female in no acute distress, sitting comfortably in the hospital bed. HEENT: Normocephalic and atraumatic. Oropharynx is clear, mucous membranes are moist.  Pupils are equal, round, and reactive to light. Extraocular muscles are intact. Sclerae anicteric. Neck supple without JVD. No thyromegaly present. Lymph node   No palpable submandibular, cervical, supraclavicular, axillary or inguinal lymph nodes. Skin Warm and dry. No bruising and no rash noted. No erythema. No pallor. Respiratory Lungs are clear to auscultation bilaterally without wheezes, rales or rhonchi, normal air exchange without accessory muscle use. CVS Normal rate, regular rhythm and normal S1 and S2. No murmurs, gallops, or rubs. Abdomen Soft, nontender and nondistended, normoactive bowel sounds. No palpable mass. No hepatosplenomegaly. Neuro Grossly nonfocal with no obvious sensory or motor deficits. MSK Normal range of motion in general.  No edema and no tenderness. Psych Appropriate mood and affect.         Labs:    Recent Results (from the past 24 hour(s))   CBC WITH AUTOMATED DIFF    Collection Time: 21  8:45 AM   Result Value Ref Range    WBC 0.1 (LL) 4.3 - 11.1 K/uL    RBC 3.01 (L) 4.05 - 5.25 M/uL    HGB 9.3 (L) 11.7 - 15.4 g/dL    HCT 28.4 (L) 35.8 - 46.3 %    MCV 94.4 79.6 - 97.8 FL    MCH 30.9 26.1 - 32.9 PG    MCHC 32.7 31.4 - 35.0 g/dL    RDW 13.2 11.9 - 14.6 %    PLATELET 9 (LL) 048 - 450 K/uL    MPV 9.7 9.4 - 12.3 FL ABSOLUTE NRBC 0.00 0.0 - 0.2 K/uL    DF MANUAL      RBC COMMENTS SLIGHT  ANISOCYTOSIS + POIKILOCYTOSIS        RBC COMMENTS OCCASIONAL  OVALOCYTES        RBC COMMENTS SLIGHT  POLYCHROMASIA        WBC COMMENTS Result Confirmed By Smear      PLATELET COMMENTS MARKED     GGT    Collection Time: 01/08/21  8:45 AM   Result Value Ref Range    GGT 44 5 - 55 U/L   LD    Collection Time: 01/08/21  8:45 AM   Result Value Ref Range     110 - 210 U/L   MAGNESIUM    Collection Time: 01/08/21  8:45 AM   Result Value Ref Range    Magnesium 2.0 1.8 - 2.4 mg/dL   METABOLIC PANEL, COMPREHENSIVE    Collection Time: 01/08/21  8:45 AM   Result Value Ref Range    Sodium 142 136 - 145 mmol/L    Potassium 3.5 3.5 - 5.1 mmol/L    Chloride 111 (H) 98 - 107 mmol/L    CO2 24 21 - 32 mmol/L    Anion gap 7 7 - 16 mmol/L    Glucose 89 65 - 100 mg/dL    BUN 5 (L) 8 - 23 MG/DL    Creatinine 0.50 (L) 0.6 - 1.0 MG/DL    GFR est AA >60 >60 ml/min/1.73m2    GFR est non-AA >60 >60 ml/min/1.73m2    Calcium 7.7 (L) 8.3 - 10.4 MG/DL    Bilirubin, total 0.2 0.2 - 1.1 MG/DL    ALT (SGPT) 23 12 - 65 U/L    AST (SGOT) 11 (L) 15 - 37 U/L    Alk.  phosphatase 64 50 - 136 U/L    Protein, total 5.5 (L) 6.3 - 8.2 g/dL    Albumin 2.6 (L) 3.2 - 4.6 g/dL    Globulin 2.9 2.3 - 3.5 g/dL    A-G Ratio 0.9 (L) 1.2 - 3.5     PHOSPHORUS    Collection Time: 01/08/21  8:45 AM   Result Value Ref Range    Phosphorus 3.2 2.3 - 3.7 MG/DL   PLATELETS, ALLOCATE    Collection Time: 01/08/21  9:30 AM   Result Value Ref Range    Unit number [id number]     Blood component type PLTPH,LRIR,1     Unit division 00     Status of unit ISSUED    URINALYSIS W/ RFLX MICROSCOPIC    Collection Time: 01/08/21 10:33 AM   Result Value Ref Range    Color STRAW      Appearance CLEAR      Specific gravity 1.010 1.001 - 1.023      pH (UA) 6.0 5.0 - 9.0      Protein Negative NEG mg/dL    Glucose Negative NEG mg/dL    Ketone Negative NEG mg/dL    Bilirubin Negative NEG      Blood MODERATE (A) NEG Urobilinogen 0.2 0.2 - 1.0 EU/dL    Nitrites Negative NEG      Leukocyte Esterase Negative NEG     URINE MICROSCOPIC    Collection Time: 01/08/21 10:33 AM   Result Value Ref Range    WBC 0 0 /hpf    RBC 0-3 0 /hpf    Epithelial cells 0 0 /hpf    Bacteria 0 0 /hpf    Casts 0 0 /lpf    Crystals, urine 0 0 /LPF    Mucus 0 0 /lpf       Imaging:  No images are attached to the encounter. ASSESSMENT:  Problem List  Date Reviewed: 12/8/2020          Codes Class Noted    Neutropenic fever ([facility] [address] ICD-10-CM: D70.9, R50.81  ICD-9-CM: 288.00, 780.61  1/8/2021        Multiple myeloma not having achieved remission ([facility] [address] ICD-10-CM: C90.00  ICD-9-CM: 203.00  11/24/2020        Essential hypertension ICD-10-CM: I10  ICD-9-CM: 401.9  3/29/2018        Hyperlipidemia ICD-10-CM: E78.5  ICD-9-CM: 272.4  3/29/2018        Left hip pain ICD-10-CM: M25.552  ICD-9-CM: 719.45  3/29/2018        GERD (gastroesophageal reflux disease) ICD-10-CM: K21.9  ICD-9-CM: 530.81  3/29/2018                PLAN:  · Admit to our service. · Follow-up her cultures. · IV ABX  · IV fluids  · Continue G-CSF  · Transfuse as per protocol    Lab studies and imaging studies were personally reviewed. Pertinent old records were reviewed from [facility].               Yuri Osorio MD  [address] [place] [address]  Office : [phone]  Fax : [phone]

## 2021-01-09 ENCOUNTER — HOSPITAL ENCOUNTER (OUTPATIENT)
Dept: INFUSION THERAPY | Age: 61
End: 2021-01-09

## 2021-01-09 LAB
ABO + RH BLD: NORMAL
ALBUMIN SERPL-MCNC: 2.4 G/DL (ref 3.2–4.6)
ALBUMIN/GLOB SERPL: 0.9 {RATIO} (ref 1.2–3.5)
ALP SERPL-CCNC: 66 U/L (ref 50–136)
ALT SERPL-CCNC: 25 U/L (ref 12–65)
ANION GAP SERPL CALC-SCNC: 7 MMOL/L (ref 7–16)
AST SERPL-CCNC: 16 U/L (ref 15–37)
BILIRUB SERPL-MCNC: 0.2 MG/DL (ref 0.2–1.1)
BLD PROD TYP BPU: NORMAL
BLOOD GROUP ANTIBODIES SERPL: NORMAL
BPU ID: NORMAL
BUN SERPL-MCNC: 4 MG/DL (ref 8–23)
C DIFF GDH STL QL: NORMAL
C DIFF TOX A+B STL QL IA: NORMAL
CALCIUM SERPL-MCNC: 7 MG/DL (ref 8.3–10.4)
CHLORIDE SERPL-SCNC: 113 MMOL/L (ref 98–107)
CLINICAL CONSIDERATION: NORMAL
CO2 SERPL-SCNC: 23 MMOL/L (ref 21–32)
CREAT SERPL-MCNC: 0.48 MG/DL (ref 0.6–1)
DIFFERENTIAL METHOD BLD: ABNORMAL
ERYTHROCYTE [DISTWIDTH] IN BLOOD BY AUTOMATED COUNT: 13.3 % (ref 11.9–14.6)
GLOBULIN SER CALC-MCNC: 2.8 G/DL (ref 2.3–3.5)
GLUCOSE SERPL-MCNC: 92 MG/DL (ref 65–100)
HCT VFR BLD AUTO: 25.3 % (ref 35.8–46.3)
HGB BLD-MCNC: 8.1 G/DL (ref 11.7–15.4)
INTERPRETATION: NORMAL
MAGNESIUM SERPL-MCNC: 1.8 MG/DL (ref 1.8–2.4)
MCH RBC QN AUTO: 30.8 PG (ref 26.1–32.9)
MCHC RBC AUTO-ENTMCNC: 32 G/DL (ref 31.4–35)
MCV RBC AUTO: 96.2 FL (ref 79.6–97.8)
NRBC # BLD: 0 K/UL (ref 0–0.2)
PCR REFLEX: NORMAL
PLATELET # BLD AUTO: 42 K/UL (ref 150–450)
PLATELET COMMENTS,PCOM: ABNORMAL
PMV BLD AUTO: 11.4 FL (ref 9.4–12.3)
POTASSIUM SERPL-SCNC: 3.1 MMOL/L (ref 3.5–5.1)
POTASSIUM SERPL-SCNC: 3.4 MMOL/L (ref 3.5–5.1)
PROT SERPL-MCNC: 5.2 G/DL (ref 6.3–8.2)
RBC # BLD AUTO: 2.63 M/UL (ref 4.05–5.2)
RBC MORPH BLD: ABNORMAL
SODIUM SERPL-SCNC: 143 MMOL/L (ref 136–145)
SPECIMEN EXP DATE BLD: NORMAL
STATUS OF UNIT,%ST: NORMAL
UNIT DIVISION, %UDIV: 0
WBC # BLD AUTO: 0.3 K/UL (ref 4.3–11.1)
WBC MORPH BLD: ABNORMAL

## 2021-01-09 PROCEDURE — 86901 BLOOD TYPING SEROLOGIC RH(D): CPT

## 2021-01-09 PROCEDURE — 84132 ASSAY OF SERUM POTASSIUM: CPT

## 2021-01-09 PROCEDURE — 99233 SBSQ HOSP IP/OBS HIGH 50: CPT | Performed by: INTERNAL MEDICINE

## 2021-01-09 PROCEDURE — 74011250636 HC RX REV CODE- 250/636: Performed by: INTERNAL MEDICINE

## 2021-01-09 PROCEDURE — 83735 ASSAY OF MAGNESIUM: CPT

## 2021-01-09 PROCEDURE — 80053 COMPREHEN METABOLIC PANEL: CPT

## 2021-01-09 PROCEDURE — 74011250636 HC RX REV CODE- 250/636: Performed by: NURSE PRACTITIONER

## 2021-01-09 PROCEDURE — 65270000029 HC RM PRIVATE

## 2021-01-09 PROCEDURE — 74011250637 HC RX REV CODE- 250/637: Performed by: NURSE PRACTITIONER

## 2021-01-09 PROCEDURE — 85025 COMPLETE CBC W/AUTO DIFF WBC: CPT

## 2021-01-09 PROCEDURE — 74011250637 HC RX REV CODE- 250/637: Performed by: INTERNAL MEDICINE

## 2021-01-09 PROCEDURE — 74011000250 HC RX REV CODE- 250: Performed by: INTERNAL MEDICINE

## 2021-01-09 PROCEDURE — 2709999900 HC NON-CHARGEABLE SUPPLY

## 2021-01-09 PROCEDURE — 36591 DRAW BLOOD OFF VENOUS DEVICE: CPT

## 2021-01-09 PROCEDURE — 74011000258 HC RX REV CODE- 258: Performed by: NURSE PRACTITIONER

## 2021-01-09 RX ORDER — POTASSIUM CHLORIDE 29.8 MG/ML
40 INJECTION INTRAVENOUS ONCE
Status: COMPLETED | OUTPATIENT
Start: 2021-01-09 | End: 2021-01-09

## 2021-01-09 RX ORDER — CALCIUM GLUCONATE 20 MG/ML
1 INJECTION, SOLUTION INTRAVENOUS ONCE
Status: COMPLETED | OUTPATIENT
Start: 2021-01-09 | End: 2021-01-09

## 2021-01-09 RX ORDER — LOPERAMIDE HYDROCHLORIDE 2 MG/1
2 CAPSULE ORAL
Status: DISCONTINUED | OUTPATIENT
Start: 2021-01-09 | End: 2021-01-17 | Stop reason: HOSPADM

## 2021-01-09 RX ORDER — DIPHENOXYLATE HYDROCHLORIDE AND ATROPINE SULFATE 2.5; .025 MG/1; MG/1
1 TABLET ORAL
Status: DISCONTINUED | OUTPATIENT
Start: 2021-01-09 | End: 2021-01-17 | Stop reason: HOSPADM

## 2021-01-09 RX ADMIN — CEFEPIME HYDROCHLORIDE 2 G: 2 INJECTION, POWDER, FOR SOLUTION INTRAVENOUS at 09:27

## 2021-01-09 RX ADMIN — VANCOMYCIN HYDROCHLORIDE 750 MG: 750 INJECTION, POWDER, LYOPHILIZED, FOR SOLUTION INTRAVENOUS at 01:48

## 2021-01-09 RX ADMIN — GABAPENTIN 300 MG: 300 CAPSULE ORAL at 09:27

## 2021-01-09 RX ADMIN — ONDANSETRON 4 MG: 2 INJECTION INTRAMUSCULAR; INTRAVENOUS at 22:28

## 2021-01-09 RX ADMIN — DIPHENOXYLATE HYDROCHLORIDE AND ATROPINE SULFATE 1 TABLET: 2.5; .025 TABLET ORAL at 20:57

## 2021-01-09 RX ADMIN — NYSTATIN 10 ML: 100000 SUSPENSION ORAL at 17:58

## 2021-01-09 RX ADMIN — VANCOMYCIN HYDROCHLORIDE 750 MG: 750 INJECTION, POWDER, LYOPHILIZED, FOR SOLUTION INTRAVENOUS at 13:56

## 2021-01-09 RX ADMIN — LOPERAMIDE HYDROCHLORIDE 2 MG: 2 CAPSULE ORAL at 15:36

## 2021-01-09 RX ADMIN — NYSTATIN 10 ML: 100000 SUSPENSION ORAL at 03:58

## 2021-01-09 RX ADMIN — LISINOPRIL 10 MG: 5 TABLET ORAL at 09:27

## 2021-01-09 RX ADMIN — ACYCLOVIR 400 MG: 200 CAPSULE ORAL at 17:56

## 2021-01-09 RX ADMIN — POTASSIUM CHLORIDE 40 MEQ: 400 INJECTION, SOLUTION INTRAVENOUS at 04:17

## 2021-01-09 RX ADMIN — ACYCLOVIR 400 MG: 200 CAPSULE ORAL at 09:27

## 2021-01-09 RX ADMIN — NYSTATIN 10 ML: 100000 SUSPENSION ORAL at 09:27

## 2021-01-09 RX ADMIN — DIPHENOXYLATE HYDROCHLORIDE AND ATROPINE SULFATE 1 TABLET: 2.5; .025 TABLET ORAL at 18:01

## 2021-01-09 RX ADMIN — SODIUM CHLORIDE 75 ML/HR: 900 INJECTION, SOLUTION INTRAVENOUS at 22:14

## 2021-01-09 RX ADMIN — CEFEPIME HYDROCHLORIDE 2 G: 2 INJECTION, POWDER, FOR SOLUTION INTRAVENOUS at 01:03

## 2021-01-09 RX ADMIN — NYSTATIN 10 ML: 100000 SUSPENSION ORAL at 20:55

## 2021-01-09 RX ADMIN — TBO-FILGRASTIM 300 MCG: 300 INJECTION, SOLUTION SUBCUTANEOUS at 09:27

## 2021-01-09 RX ADMIN — GABAPENTIN 300 MG: 300 CAPSULE ORAL at 21:02

## 2021-01-09 RX ADMIN — TRAMADOL HYDROCHLORIDE 50 MG: 50 TABLET ORAL at 03:58

## 2021-01-09 RX ADMIN — GABAPENTIN 300 MG: 300 CAPSULE ORAL at 15:36

## 2021-01-09 RX ADMIN — FLUCONAZOLE 200 MG: 100 TABLET ORAL at 09:27

## 2021-01-09 RX ADMIN — POTASSIUM CHLORIDE 40 MEQ: 400 INJECTION, SOLUTION INTRAVENOUS at 13:55

## 2021-01-09 RX ADMIN — LOPERAMIDE HYDROCHLORIDE 2 MG: 2 CAPSULE ORAL at 22:24

## 2021-01-09 RX ADMIN — CALCIUM GLUCONATE 1000 MG: 20 INJECTION, SOLUTION INTRAVENOUS at 05:47

## 2021-01-09 RX ADMIN — PANTOPRAZOLE SODIUM 40 MG: 40 TABLET, DELAYED RELEASE ORAL at 09:27

## 2021-01-09 RX ADMIN — HYDROCODONE BITARTRATE AND ACETAMINOPHEN 1 TABLET: 5; 325 TABLET ORAL at 09:27

## 2021-01-09 RX ADMIN — SODIUM CHLORIDE 75 ML/HR: 900 INJECTION, SOLUTION INTRAVENOUS at 05:47

## 2021-01-09 RX ADMIN — HYDROCODONE BITARTRATE AND ACETAMINOPHEN 1 TABLET: 5; 325 TABLET ORAL at 18:01

## 2021-01-09 RX ADMIN — CEFEPIME HYDROCHLORIDE 2 G: 2 INJECTION, POWDER, FOR SOLUTION INTRAVENOUS at 17:56

## 2021-01-09 NOTE — PROGRESS NOTES
[facility] Hematology & Oncology        Inpatient Hematology / Oncology Progress Note      Admission Date: 2021  2:40 PM  Reason for Admission/Hospital Course: Neutropenic fever ([address] [D70.9, R50.81]      24 Hour Events:  D+10 auto transplant  No fevers overnight  Cdiff pending  Day 2 vanc and cefe    ROS:  Constitutional: negative for fever, chills, +weakness, malaise, fatigue. CV: negative for chest pain, palpitations, edema. Respiratory:  negative for dyspnea, cough, wheezing. GI:  negative for nausea, abdominal pain. +diarrhea. 10 point review of systems is otherwise negative with the exception of the elements mentioned above in the HPI. Allergies   Allergen Reactions    Erythromycin Nausea Only    Penicillins Rash       OBJECTIVE:  Patient Vitals for the past 8 hrs:   BP Temp Pulse Resp SpO2   21 0547  98.8 °F (37.1 °C)      21 0252 129/75 98.8 °F (37.1 °C) (!) 103 18 94 %   21 0103  99.5 °F (37.5 °C)        Temp (24hrs), Av.3 °F (37.4 °C), Min:98.7 °F (37.1 °C), Max:100.3 °F (37.9 °C)    No intake/output data recorded. Physical Exam:  Constitutional: Well developed, well nourished female in no acute distress, sitting comfortably in the hospital bed. HEENT: Normocephalic and atraumatic. Oropharynx is clear, mucous membranes are moist.  Pupils are equal, round, and reactive to light. Extraocular muscles are intact. Sclerae anicteric. Skin Warm and dry. No bruising and no rash noted. No erythema. No pallor. Respiratory Lungs are clear to auscultation bilaterally without wheezes, rales or rhonchi, normal air exchange without accessory muscle use. CVS Normal rate, regular rhythm and normal S1 and S2. No murmurs, gallops, or rubs. Abdomen Soft, nontender and nondistended, normoactive bowel sounds. No palpable mass. No hepatosplenomegaly. Neuro Grossly nonfocal with no obvious sensory or motor deficits.    MSK Normal range of motion in general.  No edema and no tenderness. Psych Appropriate mood and affect.         Labs:      Recent Labs     01/09/21 0237 01/08/21  0845 01/07/21  0819 01/06/21  0814   WBC 0.3* 0.1* 0.1* 0.1*   RBC 2.63* 3.01* 3.19* 3.44*   HGB 8.1* 9.3* 9.9* 10.6*   HCT 25.3* 28.4* 30.8* 33.1*   MCV 96.2 94.4 96.6 96.2   MCH 30.8 30.9 31.0 30.8   MCHC 32.0 32.7 32.1 32.0   RDW 13.3 13.2 13.4 13.8   PLT 42* 9* 21* 40*   GRANS  --   --   --  14*   LYMPH  --   --   --  71*   MONOS  --   --   --  14*   EOS  --   --   --  0*   BASOS  --   --   --  0   IG  --   --   --  0   DF MANUAL MANUAL MANUAL AUTOMATED   ANEU  --   --   --  0.0*   ABL  --   --   --  0.1*   ABM  --   --   --  0.0*   SIDDHARTH  --   --   --  0.0   ABB  --   --   --  0.0   AIG  --   --   --  0.0        Recent Labs     01/09/21 0237 01/08/21  0845 01/07/21  0819 01/06/21  0814    142 141 142   K 3.1* 3.5 3.4* 3.4*   * 111* 110* 110*   CO2 23 24 25 23   AGAP 7 7 6* 9   GLU 92 89 107* 92   BUN 4* 5* 7* 9   CREA 0.48* 0.50* 0.60 0.70   GFRAA >60 >60 >60 >60   GFRNA >60 >60 >60 >60   CA 7.0* 7.7* 7.7* 8.0*   AP 66 64  --  70   TP 5.2* 5.5*  --  6.0*   ALB 2.4* 2.6*  --  3.0*   GLOB 2.8 2.9  --  3.0   AGRAT 0.9* 0.9*  --  1.0*   MG 1.8 2.0 1.9 2.1   PHOS  --  3.2  --  3.1         Imaging:    Medications:  Current Facility-Administered Medications   Medication Dose Route Frequency    potassium chloride 40 mEq IVPB  40 mEq IntraVENous ONCE    [START ON 1/10/2021] VANCOMYCIN INFORMATION NOTE   Other ONCE    acyclovir (ZOVIRAX) capsule 400 mg  400 mg Oral BID    pantoprazole (PROTONIX) tablet 40 mg  40 mg Oral ACB    fluconazole (DIFLUCAN) tablet 200 mg  200 mg Oral DAILY    gabapentin (NEURONTIN) capsule 300 mg  300 mg Oral TID    lisinopriL (PRINIVIL, ZESTRIL) tablet 10 mg  10 mg Oral DAILY    LORazepam (ATIVAN) tablet 1 mg  1 mg Oral Q4H PRN    traMADoL (ULTRAM) tablet 50 mg  50 mg Oral Q6H PRN    0.9% sodium chloride infusion  75 mL/hr IntraVENous CONTINUOUS    cefepime (MAXIPIME) 2 g in 0.9% sodium chloride (MBP/ADV) 100 mL MBP  2 g IntraVENous Q8H    ondansetron (ZOFRAN) injection 4 mg  4 mg IntraVENous Q4H PRN    prochlorperazine (COMPAZINE) with saline injection 10 mg  10 mg IntraVENous Q6H PRN    HYDROcodone-acetaminophen (NORCO) 5-325 mg per tablet 1 Tab  1 Tab Oral Q6H PRN    morphine injection 2 mg  2 mg IntraVENous Q4H PRN    vancomycin (VANCOCIN) 750 mg in 0.9% sodium chloride 250 mL (VIAL-MATE)  750 mg IntraVENous Q12H    magic mouthwash (OLGA) oral suspension 10 mL  10 mL Oral Q4H    central line flush (saline) syringe 10 mL  10 mL InterCATHeter PRN    heparin (porcine) pf 300 Units  300 Units InterCATHeter PRN     Facility-Administered Medications Ordered in Other Encounters   Medication Dose Route Frequency    central line flush (saline) syringe 10-40 mL  10-40 mL InterCATHeter Q8H         ASSESSMENT:    Problem List  Date Reviewed: 12/8/2020          Codes Class Noted    Neutropenic fever ([facility] [address] ICD-10-CM: D70.9, R50.81  ICD-9-CM: 288.00, 780.61  1/8/2021        Multiple myeloma not having achieved remission ([facility] [address] ICD-10-CM: C90.00  ICD-9-CM: 203.00  11/24/2020        Essential hypertension ICD-10-CM: I10  ICD-9-CM: 401.9  3/29/2018        Hyperlipidemia ICD-10-CM: E78.5  ICD-9-CM: 272.4  3/29/2018        Left hip pain ICD-10-CM: M25.552  ICD-9-CM: 719.45  3/29/2018        GERD (gastroesophageal reflux disease) ICD-10-CM: K21.9  ICD-9-CM: 530.81  3/29/2018                PLAN:  MM sp auto stem cell transplant  1/9 D+10 continue granix    Diarrhea  1/9 Cdiff pending    N. Fever  1/9 workup NTD. Day 2 Vanc/Cefe    Libby SOPs  Acyclovir/Diflucan    Goals and plan of care reviewed with the patient. All questions answered to the best of our ability.             Marcos Ortega NP   [facility] Hematology & Oncology  [address] [address]  Office : [phone]  Fax : [phone]     Attending Addendum:  I have personally performed a face to face diagnostic evaluation on this patient. I have reviewed and agree with the care plan as documented above by Quinn Parson N.P.  My findings are as follows: Patient appears lethargic, heart rate regular without murmurs, abdomen is non-tender, bowel sounds are positive. 56 female h/o Lambda LC MM s/p [place]/ASCT D+10. Admitted w febrile neutropenia. Agree w aggressive supportive care including hydration, and broad spectrum abx. Continue daily granix. Notes diarrhea: being checked for C diff. If -ve, will start anti-diarrheals. Electrolyte replacement as needed.            Yumiko Mohan MD  [facility] Hematology/Oncology  [id number] [place] [address]  Office : [phone]  Fax : [phone]

## 2021-01-09 NOTE — PROGRESS NOTES
Diagnosis: Multiple Myeloma  Transplant Date: 12/30/2020  Day: +10  Chemo regimen used: Melphalan  Labs not resulted that need follow-up: blood & urine cultures,stool for c diff  BMT assessments and toxicities completed. 1/8/21  WBC/ANC= 0.3/  Prophylactic medications started po Acyclovir,Diflucan  On IV Vanco & Cefepime for neutropenic fever  Granix started on D+6  Toxicities greater than 2 : none  Patient seen by MD/NP while inpatient & until engraftment. New orders received: K+ & Calcium IV replacements per SOP  Issues: diarrhea pending c diff result    END OF SHIFT NOTE:    Intake/Output  01/08 1901 - 01/09 0700  In: 1807 [P.O.:480; I.V.:1043]  Out: 1300 [Urine:1250]   Voiding: YES  Catheter: NO  Drain:              Stool:  4 occurrences. Stool Assessment  Stool Color: Brown (01/08/21 2304)  Stool Appearance: Loose (01/08/21 2304)  Stool Amount: Small (01/08/21 2304)  Stool Source/Status: Rectum; Incontinence (01/08/21 2304)    Emesis:  0 occurrences. VITAL SIGNS  Patient Vitals for the past 12 hrs:   Temp Pulse Resp BP SpO2   01/09/21 0252 98.8 °F (37.1 °C) (!) 103 18 129/75 94 %   01/09/21 0103 99.5 °F (37.5 °C)       01/08/21 2303 100.3 °F (37.9 °C) (!) 106 18 (!) 142/72 99 %   01/08/21 1915 100 °F (37.8 °C) (!) 107 16 136/75 100 %       Pain Assessment  Pain 1  Pain Scale 1: Numeric (0 - 10) (01/09/21 0358)  Pain Intensity 1: 6 (01/09/21 0358)  Patient Stated Pain Goal: 0 (01/09/21 0358)  Pain Reassessment 1: Patient resting w/respiratory rate greater than 10 (01/09/21 0000)  Pain Onset 1: pta (01/09/21 0358)  Pain Location 1: Head (01/09/21 0358)  Pain Orientation 1: Other (comment)(temporal area) (01/09/21 0358)  Pain Description 1: Aching (01/09/21 0358)  Pain Intervention(s) 1: Medication (see MAR); Rest (01/09/21 0358)    Ambulating  Yes    Additional Information:   TMax 100. 3.  x4 diarrhea. Mouth care done;compliant.     Shift report given to oncoming nurse Darion Lee at the bedside.     Dougie Guzman RN

## 2021-01-09 NOTE — DISCHARGE INSTRUCTIONS
Nutrition: Continue use of oral nutrition supplements following discharge up to 3/day with poor intake of meals and related to increased nutrient needs following transplant or as advised by medical provider.

## 2021-01-09 NOTE — PROGRESS NOTES
Vancomycin Consult    MD ordering: Candace Loo   ID following? no  Indication: Febrile Neutropenia  DOT:  ?  days  Goal level(s): 15-20    Ht Readings from Last 1 Encounters:   20 5' (1.524 m)      Wt Readings from Last 1 Encounters:   21 61 kg (134 lb 6.4 oz)         Temp (24hrs), Av.1 °F (37.3 °C), Min:98.7 °F (37.1 °C), Max:99.6 °F (37.6 °C)    Dosing weight: 61 kg  56 y.o. Date:  Dose/Freq Admin Times Level/Time:    Vanc 1000 mg x 1         1110     Vanc 750 mg q12h 0148 ()    1/10 Vanc 750 mg q12h () Trough ()                 Recent Labs     21  0845 21  0819 21  0814   BUN 5* 7* 9   CREA 0.50* 0.60 0.70   WBC 0.1* 0.1* 0.1*     CrCl cannot be calculated (Unknown ideal weight.). No results found for: Heike Haywood    Day 1 Assessment and Plan: Will continue Vancomycin 750 mg IV every 12 hours. Vancomycin trough ordered for 1 am tomorrow.     Philly Bernstein, PharmD, BCPS

## 2021-01-09 NOTE — PROGRESS NOTES
Problem: Falls - Risk of  Goal: *Absence of Falls  Description: Document Lu Blood Fall Risk and appropriate interventions in the flowsheet.   Outcome: Progressing Towards Goal  Note: Fall Risk Interventions:            Medication Interventions: Teach patient to arise slowly    Elimination Interventions: Call light in reach, Elevated toilet seat, Patient to call for help with toileting needs, Stay With Me (per policy), Toilet paper/wipes in reach, Toileting schedule/hourly rounds              Problem: Patient Education: Go to Patient Education Activity  Goal: Patient/Family Education  Outcome: Progressing Towards Goal

## 2021-01-09 NOTE — PROGRESS NOTES
Comprehensive Nutrition Assessment    Type and Reason for Visit: Initial, Consult(Dr. Shreyas Tyler)    Nutrition Recommendations/Plan:    Pt will consume all ONS with meals    Pt will consume 75% or greater of trays   Follow up with pt for alternative intervention if po intake does not improve     Nutrition Assessment:   Nutrition History: Malnutriton screen upon admission negative for wt loss or eating poorly r/t decreased appetite. Nutrition Background: Pt with a medical hx of HTN, HLD, GERD and mutiple myeloma is admiited S/P BMT (12/30/20) with neutropemic fever. Is experiencing diarrhea with present admission and has pending CDiff stool culture. Daily Update:  Spoke with pt who reports she consumed 50% of dinner tray last evening (roast turkey, dressing, mashed potatoes) and 100% ice-cream. Breakfast 1 slice of [place] toast, 4 bites canned pears, 100% gingerale. States she threw up before breakfast this am and is experiencing diarrhea. Is ordering preferences (full trays presently) and notes she will be willing to consume ONS with chocolate as a preference. Advised pt that RD will follow up for improved intake and alternative interventions if nutrition intake does not improve to goal. Emphasized to pt the incrreased kcal and protein needs following BMT. Nutrition Related Findings:   No wt loss apparent Wound Type: None    Current Nutrition Therapies:  DIET REGULAR  DIET NUTRITIONAL SUPPLEMENTS All Meals; Ensure Enlive    Current Intake:   Average Meal Intake: (50% of 2 meals per pt) Average Supplement Intake: None ordered      Anthropometric Measures:  Height: 5' (152.4 cm)  Current Body Wt: 61 kg (134 lb 7.7 oz), Weight source: Standing scale  BMI: 26.3, Overweight (BMI 25.0-29. 9)     Ideal Body Wt: 100 lbs (45 kg), 134.5 %  Usual Body Wt: 60.8 kg (134 lb)(average wt 2020), Percent weight change: 0.4          Estimated Daily Nutrient Needs:  Energy (kcal/day): 1288-8221(71-77 kcal/kg) (Kcal/kg, Weight Used: Current(61kg))  Protein (g/day): (1.5-2 g/kg) Weight Used: (Current(61kg))  Fluid (ml/day): 1ml/kcal     Nutrition Diagnosis:   Increased nutrient needs related to hypermetabolic activty with cancer treatment as evidenced by BMT on 12/20/20. Nutrition Interventions:   Food and/or Nutrient Delivery: Continue current diet, Start oral nutrition supplement  Nutrition Education/Counseling: (Encouraged pt to consume all supplements and >75% trays)    Goals:     Active Goal: Pt to meet 75% or greater of estimated nutritonal needs while admitted    Nutrition Monitoring and Evaluation:   Food/Nutrient Intake Outcomes: Food and nutrient intake, Supplement intake     Discharge Planning:    Continue oral nutrition supplement    Fidelia Cordova [address], RD, [address], LD  [phone]        Disaster Mode active

## 2021-01-10 ENCOUNTER — APPOINTMENT (OUTPATIENT)
Dept: INFUSION THERAPY | Age: 61
End: 2021-01-10

## 2021-01-10 LAB
ALBUMIN SERPL-MCNC: 2.2 G/DL (ref 3.2–4.6)
ALBUMIN/GLOB SERPL: 0.8 {RATIO} (ref 1.2–3.5)
ALP SERPL-CCNC: 66 U/L (ref 50–136)
ALT SERPL-CCNC: 23 U/L (ref 12–65)
ANION GAP SERPL CALC-SCNC: 7 MMOL/L (ref 7–16)
AST SERPL-CCNC: 12 U/L (ref 15–37)
BACTERIA SPEC CULT: NORMAL
BASOPHILS # BLD: 0 K/UL (ref 0–0.2)
BASOPHILS NFR BLD: 1 % (ref 0–2)
BILIRUB SERPL-MCNC: 0.1 MG/DL (ref 0.2–1.1)
BUN SERPL-MCNC: 5 MG/DL (ref 8–23)
CALCIUM SERPL-MCNC: 7.1 MG/DL (ref 8.3–10.4)
CHLORIDE SERPL-SCNC: 114 MMOL/L (ref 98–107)
CO2 SERPL-SCNC: 23 MMOL/L (ref 21–32)
CREAT SERPL-MCNC: 0.43 MG/DL (ref 0.6–1)
DIFFERENTIAL METHOD BLD: ABNORMAL
EOSINOPHIL # BLD: 0 K/UL (ref 0–0.8)
EOSINOPHIL NFR BLD: 0 % (ref 0.5–7.8)
ERYTHROCYTE [DISTWIDTH] IN BLOOD BY AUTOMATED COUNT: 13.5 % (ref 11.9–14.6)
GLOBULIN SER CALC-MCNC: 2.6 G/DL (ref 2.3–3.5)
GLUCOSE SERPL-MCNC: 87 MG/DL (ref 65–100)
HCT VFR BLD AUTO: 24.2 % (ref 35.8–46.3)
HGB BLD-MCNC: 8 G/DL (ref 11.7–15.4)
IMM GRANULOCYTES # BLD AUTO: 0 K/UL (ref 0–0.5)
IMM GRANULOCYTES NFR BLD AUTO: 0 % (ref 0–5)
LYMPHOCYTES # BLD: 0.3 K/UL (ref 0.5–4.6)
LYMPHOCYTES NFR BLD: 38 % (ref 13–44)
MAGNESIUM SERPL-MCNC: 1.9 MG/DL (ref 1.8–2.4)
MCH RBC QN AUTO: 31.5 PG (ref 26.1–32.9)
MCHC RBC AUTO-ENTMCNC: 33.1 G/DL (ref 31.4–35)
MCV RBC AUTO: 95.3 FL (ref 79.6–97.8)
MONOCYTES # BLD: 0.3 K/UL (ref 0.1–1.3)
MONOCYTES NFR BLD: 33 % (ref 4–12)
NEUTS SEG # BLD: 0.3 K/UL (ref 1.7–8.2)
NEUTS SEG NFR BLD: 28 % (ref 43–78)
NRBC # BLD: 0 K/UL (ref 0–0.2)
PLATELET # BLD AUTO: 27 K/UL (ref 150–450)
PLATELET COMMENTS,PCOM: ABNORMAL
PMV BLD AUTO: 11.9 FL (ref 9.4–12.3)
POTASSIUM SERPL-SCNC: 3.1 MMOL/L (ref 3.5–5.1)
PROT SERPL-MCNC: 4.8 G/DL (ref 6.3–8.2)
RBC # BLD AUTO: 2.54 M/UL (ref 4.05–5.2)
RBC MORPH BLD: ABNORMAL
RBC MORPH BLD: ABNORMAL
SERVICE CMNT-IMP: NORMAL
SODIUM SERPL-SCNC: 144 MMOL/L (ref 136–145)
VANCOMYCIN TROUGH SERPL-MCNC: 7.1 UG/ML (ref 5–20)
WBC # BLD AUTO: 0.9 K/UL (ref 4.3–11.1)
WBC MORPH BLD: ABNORMAL

## 2021-01-10 PROCEDURE — 2709999900 HC NON-CHARGEABLE SUPPLY

## 2021-01-10 PROCEDURE — 74011250636 HC RX REV CODE- 250/636: Performed by: NURSE PRACTITIONER

## 2021-01-10 PROCEDURE — 80053 COMPREHEN METABOLIC PANEL: CPT

## 2021-01-10 PROCEDURE — 83735 ASSAY OF MAGNESIUM: CPT

## 2021-01-10 PROCEDURE — 74011000250 HC RX REV CODE- 250: Performed by: INTERNAL MEDICINE

## 2021-01-10 PROCEDURE — 65270000029 HC RM PRIVATE

## 2021-01-10 PROCEDURE — 80202 ASSAY OF VANCOMYCIN: CPT

## 2021-01-10 PROCEDURE — 74011250637 HC RX REV CODE- 250/637: Performed by: NURSE PRACTITIONER

## 2021-01-10 PROCEDURE — 85025 COMPLETE CBC W/AUTO DIFF WBC: CPT

## 2021-01-10 PROCEDURE — 74011250636 HC RX REV CODE- 250/636: Performed by: INTERNAL MEDICINE

## 2021-01-10 PROCEDURE — 74011000258 HC RX REV CODE- 258: Performed by: NURSE PRACTITIONER

## 2021-01-10 PROCEDURE — 99233 SBSQ HOSP IP/OBS HIGH 50: CPT | Performed by: INTERNAL MEDICINE

## 2021-01-10 PROCEDURE — 74011250637 HC RX REV CODE- 250/637: Performed by: INTERNAL MEDICINE

## 2021-01-10 RX ORDER — POTASSIUM CHLORIDE 29.8 MG/ML
40 INJECTION INTRAVENOUS ONCE
Status: COMPLETED | OUTPATIENT
Start: 2021-01-10 | End: 2021-01-10

## 2021-01-10 RX ORDER — ACETAMINOPHEN 325 MG/1
650 TABLET ORAL
Status: DISCONTINUED | OUTPATIENT
Start: 2021-01-10 | End: 2021-01-17 | Stop reason: HOSPADM

## 2021-01-10 RX ADMIN — NYSTATIN 10 ML: 100000 SUSPENSION ORAL at 12:44

## 2021-01-10 RX ADMIN — ONDANSETRON 4 MG: 2 INJECTION INTRAMUSCULAR; INTRAVENOUS at 20:10

## 2021-01-10 RX ADMIN — LISINOPRIL 10 MG: 5 TABLET ORAL at 08:51

## 2021-01-10 RX ADMIN — CEFEPIME HYDROCHLORIDE 2 G: 2 INJECTION, POWDER, FOR SOLUTION INTRAVENOUS at 10:23

## 2021-01-10 RX ADMIN — GABAPENTIN 300 MG: 300 CAPSULE ORAL at 22:15

## 2021-01-10 RX ADMIN — TBO-FILGRASTIM 300 MCG: 300 INJECTION, SOLUTION SUBCUTANEOUS at 08:51

## 2021-01-10 RX ADMIN — PANTOPRAZOLE SODIUM 40 MG: 40 TABLET, DELAYED RELEASE ORAL at 06:23

## 2021-01-10 RX ADMIN — POTASSIUM CHLORIDE 40 MEQ: 400 INJECTION, SOLUTION INTRAVENOUS at 06:20

## 2021-01-10 RX ADMIN — DIPHENOXYLATE HYDROCHLORIDE AND ATROPINE SULFATE 1 TABLET: 2.5; .025 TABLET ORAL at 07:15

## 2021-01-10 RX ADMIN — LOPERAMIDE HYDROCHLORIDE 2 MG: 2 CAPSULE ORAL at 20:10

## 2021-01-10 RX ADMIN — ACYCLOVIR 400 MG: 200 CAPSULE ORAL at 08:51

## 2021-01-10 RX ADMIN — ACYCLOVIR 400 MG: 200 CAPSULE ORAL at 17:12

## 2021-01-10 RX ADMIN — LORAZEPAM 1 MG: 1 TABLET ORAL at 23:57

## 2021-01-10 RX ADMIN — CEFEPIME HYDROCHLORIDE 2 G: 2 INJECTION, POWDER, FOR SOLUTION INTRAVENOUS at 01:40

## 2021-01-10 RX ADMIN — DIPHENOXYLATE HYDROCHLORIDE AND ATROPINE SULFATE 1 TABLET: 2.5; .025 TABLET ORAL at 15:50

## 2021-01-10 RX ADMIN — LORAZEPAM 1 MG: 1 TABLET ORAL at 15:50

## 2021-01-10 RX ADMIN — NYSTATIN 10 ML: 100000 SUSPENSION ORAL at 19:56

## 2021-01-10 RX ADMIN — NYSTATIN 10 ML: 100000 SUSPENSION ORAL at 15:50

## 2021-01-10 RX ADMIN — CEFEPIME HYDROCHLORIDE 2 G: 2 INJECTION, POWDER, FOR SOLUTION INTRAVENOUS at 17:12

## 2021-01-10 RX ADMIN — VANCOMYCIN HYDROCHLORIDE 750 MG: 750 INJECTION, POWDER, LYOPHILIZED, FOR SOLUTION INTRAVENOUS at 19:55

## 2021-01-10 RX ADMIN — VANCOMYCIN HYDROCHLORIDE 750 MG: 750 INJECTION, POWDER, LYOPHILIZED, FOR SOLUTION INTRAVENOUS at 12:44

## 2021-01-10 RX ADMIN — DIPHENOXYLATE HYDROCHLORIDE AND ATROPINE SULFATE 1 TABLET: 2.5; .025 TABLET ORAL at 23:51

## 2021-01-10 RX ADMIN — VANCOMYCIN HYDROCHLORIDE 750 MG: 750 INJECTION, POWDER, LYOPHILIZED, FOR SOLUTION INTRAVENOUS at 02:22

## 2021-01-10 RX ADMIN — GABAPENTIN 300 MG: 300 CAPSULE ORAL at 15:50

## 2021-01-10 RX ADMIN — NYSTATIN 10 ML: 100000 SUSPENSION ORAL at 08:51

## 2021-01-10 RX ADMIN — ACETAMINOPHEN 650 MG: 325 TABLET, FILM COATED ORAL at 03:45

## 2021-01-10 RX ADMIN — FLUCONAZOLE 200 MG: 100 TABLET ORAL at 08:51

## 2021-01-10 RX ADMIN — NYSTATIN 10 ML: 100000 SUSPENSION ORAL at 03:45

## 2021-01-10 RX ADMIN — GABAPENTIN 300 MG: 300 CAPSULE ORAL at 08:51

## 2021-01-10 RX ADMIN — LOPERAMIDE HYDROCHLORIDE 2 MG: 2 CAPSULE ORAL at 12:50

## 2021-01-10 RX ADMIN — ONDANSETRON 4 MG: 2 INJECTION INTRAMUSCULAR; INTRAVENOUS at 10:35

## 2021-01-10 NOTE — PROGRESS NOTES
Problem: Falls - Risk of  Goal: *Absence of Falls  Description: Document Justo Marei Fall Risk and appropriate interventions in the flowsheet.   Outcome: Progressing Towards Goal  Note: Fall Risk Interventions:  Mobility Interventions: Patient to call before getting OOB         Medication Interventions: Evaluate medications/consider consulting pharmacy    Elimination Interventions: Call light in reach, Patient to call for help with toileting needs              Problem: Patient Education: Go to Patient Education Activity  Goal: Patient/Family Education  Outcome: Progressing Towards Goal

## 2021-01-10 NOTE — PROGRESS NOTES
Pharmacokinetic Consult to Pharmacist    Theodora Ocampoelaine is a 56 y.o. female being treated with vancomycin. Height: 5' (152.4 cm)  Weight: 62.2 kg (137 lb 3.2 oz)  Lab Results   Component Value Date/Time    BUN 5 (L) 01/10/2021 01:40 AM    Creatinine 0.43 (L) 01/10/2021 01:40 AM    WBC 0.9 (LL) 01/10/2021 01:40 AM      Estimated Creatinine Clearance: 68.7 mL/min (A) (by C-G formula based on SCr of 0.43 mg/dL (L)). Lab Results   Component Value Date/Time    Vancomycin,trough 7.1 01/10/2021 01:40 AM       Day 3 of vancomycin. Goal trough is 7.1. Will adjust dose to 750 mg q 8 hours and monitor closely. Please call with any questions. Will continue to follow patient.       Thank you,  Duncan Hairston, PharmD, [address]  Clinical Pharmacist  [phone]

## 2021-01-10 NOTE — PROGRESS NOTES
Diagnosis: Multiple Myeloma  Transplant Date: 12/30/2020  Day: +11  Chemo regimen used: Melphalan  Labs not resulted that need follow-up: blood & urine cultures - NGTD  BMT assessments and toxicities completed. 1/9/21  WBC/ANC= 0.9/0.3  Prophylactic medications started po Acyclovir,Diflucan  On IV Vanco & Cefepime for neutropenic fever  Granix started on D+6  Toxicities greater than 2 : none  Patient seen by MD/NP while inpatient & until engraftment. New orders received: K+  IV replacements per SOP  Issues: Diarrhea appears to be improving.     END OF SHIFT NOTE: 7p ~ 7a    Max temp 99.3  Medicated x 3 with antidiarrheal agents this shift. Voiding without difficulty. Appears to have rested well this HS. Continuing with current POC. Report to oncoming RN at [facility].

## 2021-01-10 NOTE — PROGRESS NOTES
[facility]ematology & On[facility]        Inpatient Hematology / Oncology Progress Note      Admission Date: 2021  2:40 PM  Reason for Admission/Hospital Course: Neutropenic fever ([facility] [address] [D70.9, R50.81]      24 Hour Events:  D+11 auto transplant  No fevers overnight  Cdiff negative  Day 3 vanc and cefe  Fever workup NTD    ROS:  Constitutional: negative for fever, chills, +weakness, malaise, fatigue. CV: negative for chest pain, palpitations, edema. Respiratory:  negative for dyspnea, cough, wheezing. GI:  negative for nausea, abdominal pain. +diarrhea. 10 point review of systems is otherwise negative with the exception of the elements mentioned above in the HPI. Allergies   Allergen Reactions    Erythromycin Nausea Only    Penicillins Rash       OBJECTIVE:  Patient Vitals for the past 8 hrs:   BP Temp Pulse Resp SpO2 Weight   01/10/21 0620      137 lb 3.2 oz (62.2 kg)   01/10/21 0345 113/60 99.3 °F (37.4 °C) 94 18 97 %      Temp (24hrs), Av.8 °F (37.1 °C), Min:98.2 °F (36.8 °C), Max:99.3 °F (37.4 °C)    No intake/output data recorded. Physical Exam:  Constitutional: Well developed, frail appearing female in no acute distress, sitting comfortably in the hospital bed. HEENT: Normocephalic and atraumatic. Oropharynx is clear, mucous membranes are moist.  Pupils are equal, round, and reactive to light. Extraocular muscles are intact. Sclerae anicteric. Skin Warm and dry. No bruising and no rash noted. No erythema. No pallor. Respiratory Lungs are clear to auscultation bilaterally without wheezes, rales or rhonchi, normal air exchange without accessory muscle use. CVS Normal rate, regular rhythm and normal S1 and S2. No murmurs, gallops, or rubs. Abdomen Soft, nontender and nondistended, normoactive bowel sounds. No palpable mass. No hepatosplenomegaly. Neuro Grossly nonfocal with no obvious sensory or motor deficits.    MSK Normal range of motion in general.  No edema and no tenderness. Psych Appropriate mood and affect.         Labs:      Recent Labs     01/10/21  0140 01/09/21  0237 01/08/21  0845   WBC 0.9* 0.3* 0.1*   RBC 2.54* 2.63* 3.01*   HGB 8.0* 8.1* 9.3*   HCT 24.2* 25.3* 28.4*   MCV 95.3 96.2 94.4   MCH 31.5 30.8 30.9   MCHC 33.1 32.0 32.7   RDW 13.5 13.3 13.2   PLT 27* 42* 9*   GRANS 28*  --   --    LYMPH 38  --   --    MONOS 33*  --   --    EOS 0*  --   --    BASOS 1  --   --    IG 0  --   --    DF AUTOMATED MANUAL MANUAL   ANEU 0.3*  --   --    ABL 0.3*  --   --    ABM 0.3  --   --    SIDDHARTH 0.0  --   --    ABB 0.0  --   --    AIG 0.0  --   --         Recent Labs     01/10/21  0140 01/09/21  1117 01/09/21 0237 01/08/21  0845     --  143 142   K 3.1* 3.4* 3.1* 3.5   *  --  113* 111*   CO2 23  --  23 24   AGAP 7  --  7 7   GLU 87  --  92 89   BUN 5*  --  4* 5*   CREA 0.43*  --  0.48* 0.50*   GFRAA >60  --  >60 >60   GFRNA >60  --  >60 >60   CA 7.1*  --  7.0* 7.7*   AP 66  --  66 64   TP 4.8*  --  5.2* 5.5*   ALB 2.2*  --  2.4* 2.6*   GLOB 2.6  --  2.8 2.9   AGRAT 0.8*  --  0.9* 0.9*   MG 1.9  --  1.8 2.0   PHOS  --   --   --  3.2         Imaging:    Medications:  Current Facility-Administered Medications   Medication Dose Route Frequency    acetaminophen (TYLENOL) tablet 650 mg  650 mg Oral Q6H PRN    vancomycin (VANCOCIN) 1,000 mg in 0.9% sodium chloride 250 mL (VIAL-MATE)  1,000 mg IntraVENous Q12H    potassium chloride 40 mEq IVPB  40 mEq IntraVENous ONCE    tbo-filgrastim (GRANIX) injection 300 mcg  300 mcg SubCUTAneous DAILY    loperamide (IMODIUM) capsule 2 mg  2 mg Oral Q4H PRN    diphenoxylate-atropine (LOMOTIL) tablet 1 Tab  1 Tab Oral QID PRN    acyclovir (ZOVIRAX) capsule 400 mg  400 mg Oral BID    pantoprazole (PROTONIX) tablet 40 mg  40 mg Oral ACB    fluconazole (DIFLUCAN) tablet 200 mg  200 mg Oral DAILY    gabapentin (NEURONTIN) capsule 300 mg  300 mg Oral TID    lisinopriL (PRINIVIL, ZESTRIL) tablet 10 mg  10 mg Oral DAILY    LORazepam (ATIVAN) tablet 1 mg  1 mg Oral Q4H PRN    traMADoL (ULTRAM) tablet 50 mg  50 mg Oral Q6H PRN    0.9% sodium chloride infusion  75 mL/hr IntraVENous CONTINUOUS    cefepime (MAXIPIME) 2 g in 0.9% sodium chloride (MBP/ADV) 100 mL MBP  2 g IntraVENous Q8H    ondansetron (ZOFRAN) injection 4 mg  4 mg IntraVENous Q4H PRN    prochlorperazine (COMPAZINE) with saline injection 10 mg  10 mg IntraVENous Q6H PRN    HYDROcodone-acetaminophen (NORCO) 5-325 mg per tablet 1 Tab  1 Tab Oral Q6H PRN    morphine injection 2 mg  2 mg IntraVENous Q4H PRN    magic mouthwash (OLGA) oral suspension 10 mL  10 mL Oral Q4H    central line flush (saline) syringe 10 mL  10 mL InterCATHeter PRN    heparin (porcine) pf 300 Units  300 Units InterCATHeter PRN         ASSESSMENT:    Problem List  Date Reviewed: 12/8/2020          Codes Class Noted    Neutropenic fever ([facility] ICD-10-CM: D70.9, R50.81  ICD-9-CM: 288.00, 780.61  1/8/2021        Multiple myeloma not having achieved remission ([facility] ICD-10-CM: C90.00  ICD-9-CM: 203.00  11/24/2020        Essential hypertension ICD-10-CM: I10  ICD-9-CM: 401.9  3/29/2018        Hyperlipidemia ICD-10-CM: E78.5  ICD-9-CM: 272.4  3/29/2018        Left hip pain ICD-10-CM: M25.552  ICD-9-CM: 719.45  3/29/2018        GERD (gastroesophageal reflux disease) ICD-10-CM: K21.9  ICD-9-CM: 530.81  3/29/2018                PLAN:  MM sp auto stem cell transplant  1/9 D+10 continue granix  1/10 ANC 0.3     Diarrhea  1/9 Cdiff pending  1/10 Cdiff negative. Anti diarrheals helping. N. Fever  1/9 workup NTD. Day 2 Vanc/Cefe    Libby SOPs  Acyclovir/Diflucan    Goals and plan of care reviewed with the patient. All questions answered to the best of our ability.             Bobby Heater, NP   [facility] Hematology & Oncology  [address] [address]  Office : [phone]  Fax : [phone]       Attending Addendum:  I have personally performed a face to face diagnostic evaluation on this patient. I have reviewed and agree with the care plan as documented above by Digna Almazan N.P.  My findings are as follows: Patient appears lethargic, heart rate regular without murmurs, abdomen is non-tender, bowel sounds are positive. 56 female h/o Lambda LC MM s/p [place]/ASCT D+11. Admitted w febrile neutropenia. Agree w aggressive supportive care including hydration, and broad spectrum abx. Continue daily granix. Notes diarrhea:  C diff -ve, started anti-diarrheals. Electrolyte replacement as needed. Await count recovery.  Ambulation encouraged.              Constantino Orozco MD  [facility] Hematology/Oncology  [address] [place] [address]  Office : [phone]  Fax : [phone]

## 2021-01-10 NOTE — PROGRESS NOTES
SW met with patient, confirmed demographic information. Patient reports being independent at home, does not use assistive devices to ambulate, and denies any falls. Patient is also established with primary care and is seen regularly. No needs identified from SW at this time.      Carmell Romberg, MSW    [facility]    * [email or web address]

## 2021-01-11 ENCOUNTER — APPOINTMENT (OUTPATIENT)
Dept: ULTRASOUND IMAGING | Age: 61
DRG: 809 | End: 2021-01-11
Attending: NURSE PRACTITIONER
Payer: COMMERCIAL

## 2021-01-11 LAB
ALBUMIN SERPL-MCNC: 2.3 G/DL (ref 3.2–4.6)
ALBUMIN/GLOB SERPL: 0.8 {RATIO} (ref 1.2–3.5)
ALP SERPL-CCNC: 68 U/L (ref 50–136)
ALT SERPL-CCNC: 20 U/L (ref 12–65)
ANION GAP SERPL CALC-SCNC: 9 MMOL/L (ref 7–16)
AST SERPL-CCNC: 12 U/L (ref 15–37)
BILIRUB SERPL-MCNC: 0.2 MG/DL (ref 0.2–1.1)
BUN SERPL-MCNC: 5 MG/DL (ref 8–23)
CALCIUM SERPL-MCNC: 7.5 MG/DL (ref 8.3–10.4)
CHLORIDE SERPL-SCNC: 115 MMOL/L (ref 98–107)
CO2 SERPL-SCNC: 22 MMOL/L (ref 21–32)
CREAT SERPL-MCNC: 0.54 MG/DL (ref 0.6–1)
DIFFERENTIAL METHOD BLD: ABNORMAL
ERYTHROCYTE [DISTWIDTH] IN BLOOD BY AUTOMATED COUNT: 13.9 % (ref 11.9–14.6)
GGT SERPL-CCNC: 51 U/L (ref 5–55)
GLOBULIN SER CALC-MCNC: 2.8 G/DL (ref 2.3–3.5)
GLUCOSE SERPL-MCNC: 91 MG/DL (ref 65–100)
HCT VFR BLD AUTO: 24.2 % (ref 35.8–46.3)
HGB BLD-MCNC: 7.9 G/DL (ref 11.7–15.4)
LDH SERPL L TO P-CCNC: 253 U/L (ref 110–210)
LYMPHOCYTES # BLD: 1.2 K/UL (ref 0.5–4.6)
LYMPHOCYTES NFR BLD MANUAL: 26 % (ref 16–44)
MAGNESIUM SERPL-MCNC: 1.7 MG/DL (ref 1.8–2.4)
MCH RBC QN AUTO: 31.3 PG (ref 26.1–32.9)
MCHC RBC AUTO-ENTMCNC: 32.6 G/DL (ref 31.4–35)
MCV RBC AUTO: 96 FL (ref 79.6–97.8)
METAMYELOCYTES NFR BLD MANUAL: 4 %
MONOCYTES # BLD: 0.7 K/UL (ref 0.1–1.3)
MONOCYTES NFR BLD MANUAL: 14 % (ref 3–9)
MYELOCYTES NFR BLD MANUAL: 2 %
NEUTS BAND NFR BLD MANUAL: 4 % (ref 0–6)
NEUTS SEG # BLD: 2.8 K/UL (ref 1.7–8.2)
NEUTS SEG NFR BLD MANUAL: 50 % (ref 47–75)
NRBC # BLD: 0.03 K/UL (ref 0–0.2)
PHOSPHATE SERPL-MCNC: 1.7 MG/DL (ref 2.3–3.7)
PLATELET # BLD AUTO: 23 K/UL (ref 150–450)
PLATELET COMMENTS,PCOM: ABNORMAL
PMV BLD AUTO: 12.9 FL (ref 9.4–12.3)
POTASSIUM SERPL-SCNC: 2.7 MMOL/L (ref 3.5–5.1)
POTASSIUM SERPL-SCNC: 3.3 MMOL/L (ref 3.5–5.1)
PROT SERPL-MCNC: 5.1 G/DL (ref 6.3–8.2)
RBC # BLD AUTO: 2.52 M/UL (ref 4.05–5.2)
RBC MORPH BLD: ABNORMAL
RBC MORPH BLD: ABNORMAL
SODIUM SERPL-SCNC: 146 MMOL/L (ref 136–145)
TOTAL CELLS COUNTED SPEC: 50
WBC # BLD AUTO: 4.7 K/UL (ref 4.3–11.1)
WBC MORPH BLD: ABNORMAL

## 2021-01-11 PROCEDURE — 74011000258 HC RX REV CODE- 258: Performed by: NURSE PRACTITIONER

## 2021-01-11 PROCEDURE — 87305 ASPERGILLUS AG IA: CPT

## 2021-01-11 PROCEDURE — 83735 ASSAY OF MAGNESIUM: CPT

## 2021-01-11 PROCEDURE — 93971 EXTREMITY STUDY: CPT

## 2021-01-11 PROCEDURE — 80053 COMPREHEN METABOLIC PANEL: CPT

## 2021-01-11 PROCEDURE — APPSS60 APP SPLIT SHARED TIME 46-60 MINUTES: Performed by: NURSE PRACTITIONER

## 2021-01-11 PROCEDURE — 87449 NOS EACH ORGANISM AG IA: CPT

## 2021-01-11 PROCEDURE — 84100 ASSAY OF PHOSPHORUS: CPT

## 2021-01-11 PROCEDURE — 74011250637 HC RX REV CODE- 250/637: Performed by: INTERNAL MEDICINE

## 2021-01-11 PROCEDURE — 74011250637 HC RX REV CODE- 250/637: Performed by: NURSE PRACTITIONER

## 2021-01-11 PROCEDURE — 74011250636 HC RX REV CODE- 250/636: Performed by: NURSE PRACTITIONER

## 2021-01-11 PROCEDURE — 82977 ASSAY OF GGT: CPT

## 2021-01-11 PROCEDURE — 99233 SBSQ HOSP IP/OBS HIGH 50: CPT | Performed by: INTERNAL MEDICINE

## 2021-01-11 PROCEDURE — 65270000029 HC RM PRIVATE

## 2021-01-11 PROCEDURE — 36591 DRAW BLOOD OFF VENOUS DEVICE: CPT

## 2021-01-11 PROCEDURE — 83615 LACTATE (LD) (LDH) ENZYME: CPT

## 2021-01-11 PROCEDURE — 84132 ASSAY OF SERUM POTASSIUM: CPT

## 2021-01-11 PROCEDURE — 85025 COMPLETE CBC W/AUTO DIFF WBC: CPT

## 2021-01-11 PROCEDURE — 74011250636 HC RX REV CODE- 250/636: Performed by: INTERNAL MEDICINE

## 2021-01-11 RX ORDER — POTASSIUM CHLORIDE 29.8 MG/ML
40 INJECTION INTRAVENOUS ONCE
Status: COMPLETED | OUTPATIENT
Start: 2021-01-11 | End: 2021-01-11

## 2021-01-11 RX ORDER — MAGNESIUM SULFATE HEPTAHYDRATE 40 MG/ML
2 INJECTION, SOLUTION INTRAVENOUS ONCE
Status: COMPLETED | OUTPATIENT
Start: 2021-01-11 | End: 2021-01-11

## 2021-01-11 RX ORDER — SODIUM CHLORIDE 450 MG/100ML
50 INJECTION, SOLUTION INTRAVENOUS CONTINUOUS
Status: DISCONTINUED | OUTPATIENT
Start: 2021-01-11 | End: 2021-01-17 | Stop reason: HOSPADM

## 2021-01-11 RX ADMIN — DIPHENOXYLATE HYDROCHLORIDE AND ATROPINE SULFATE 1 TABLET: 2.5; .025 TABLET ORAL at 22:35

## 2021-01-11 RX ADMIN — VANCOMYCIN HYDROCHLORIDE 750 MG: 750 INJECTION, POWDER, LYOPHILIZED, FOR SOLUTION INTRAVENOUS at 20:21

## 2021-01-11 RX ADMIN — SODIUM CHLORIDE 75 ML/HR: 900 INJECTION, SOLUTION INTRAVENOUS at 07:17

## 2021-01-11 RX ADMIN — DIPHENOXYLATE HYDROCHLORIDE AND ATROPINE SULFATE 1 TABLET: 2.5; .025 TABLET ORAL at 19:12

## 2021-01-11 RX ADMIN — ONDANSETRON 4 MG: 2 INJECTION INTRAMUSCULAR; INTRAVENOUS at 10:00

## 2021-01-11 RX ADMIN — VANCOMYCIN HYDROCHLORIDE 750 MG: 750 INJECTION, POWDER, LYOPHILIZED, FOR SOLUTION INTRAVENOUS at 11:44

## 2021-01-11 RX ADMIN — FLUCONAZOLE 200 MG: 100 TABLET ORAL at 08:12

## 2021-01-11 RX ADMIN — PANTOPRAZOLE SODIUM 40 MG: 40 TABLET, DELAYED RELEASE ORAL at 06:03

## 2021-01-11 RX ADMIN — POTASSIUM CHLORIDE 40 MEQ: 400 INJECTION, SOLUTION INTRAVENOUS at 04:38

## 2021-01-11 RX ADMIN — ACYCLOVIR 400 MG: 200 CAPSULE ORAL at 16:30

## 2021-01-11 RX ADMIN — DIPHENOXYLATE HYDROCHLORIDE AND ATROPINE SULFATE 1 TABLET: 2.5; .025 TABLET ORAL at 04:46

## 2021-01-11 RX ADMIN — GABAPENTIN 300 MG: 300 CAPSULE ORAL at 15:46

## 2021-01-11 RX ADMIN — ACETAMINOPHEN 650 MG: 325 TABLET, FILM COATED ORAL at 16:29

## 2021-01-11 RX ADMIN — ACYCLOVIR 400 MG: 200 CAPSULE ORAL at 08:12

## 2021-01-11 RX ADMIN — CEFEPIME HYDROCHLORIDE 2 G: 2 INJECTION, POWDER, FOR SOLUTION INTRAVENOUS at 02:16

## 2021-01-11 RX ADMIN — LOPERAMIDE HYDROCHLORIDE 2 MG: 2 CAPSULE ORAL at 20:40

## 2021-01-11 RX ADMIN — ACETAMINOPHEN 650 MG: 325 TABLET, FILM COATED ORAL at 02:27

## 2021-01-11 RX ADMIN — LOPERAMIDE HYDROCHLORIDE 2 MG: 2 CAPSULE ORAL at 08:22

## 2021-01-11 RX ADMIN — GABAPENTIN 300 MG: 300 CAPSULE ORAL at 08:12

## 2021-01-11 RX ADMIN — ACETAMINOPHEN 650 MG: 325 TABLET, FILM COATED ORAL at 22:35

## 2021-01-11 RX ADMIN — CEFEPIME HYDROCHLORIDE 2 G: 2 INJECTION, POWDER, FOR SOLUTION INTRAVENOUS at 18:12

## 2021-01-11 RX ADMIN — POTASSIUM CHLORIDE 40 MEQ: 400 INJECTION, SOLUTION INTRAVENOUS at 11:44

## 2021-01-11 RX ADMIN — MAGNESIUM SULFATE HEPTAHYDRATE 2 G: 40 INJECTION, SOLUTION INTRAVENOUS at 08:51

## 2021-01-11 RX ADMIN — GABAPENTIN 300 MG: 300 CAPSULE ORAL at 20:40

## 2021-01-11 RX ADMIN — VANCOMYCIN HYDROCHLORIDE 750 MG: 750 INJECTION, POWDER, LYOPHILIZED, FOR SOLUTION INTRAVENOUS at 04:37

## 2021-01-11 RX ADMIN — LOPERAMIDE HYDROCHLORIDE 2 MG: 2 CAPSULE ORAL at 02:27

## 2021-01-11 RX ADMIN — SODIUM CHLORIDE 50 ML/HR: 450 INJECTION, SOLUTION INTRAVENOUS at 08:17

## 2021-01-11 RX ADMIN — CEFEPIME HYDROCHLORIDE 2 G: 2 INJECTION, POWDER, FOR SOLUTION INTRAVENOUS at 09:55

## 2021-01-11 NOTE — PROGRESS NOTES
Diagnosis: Multiple Myeloma    Auto Transplant Date: 12/30/2020  AMR: +77  Chemo regimen used: Melphalan  Labs not resulted that need follow-up: Blood Cx 01/08 = NGTD, Asp Galac, Fungitell, CMV drawn 1/11/21 & pending. BMT assessments and toxicities completed. 1/10/21  WBC/ANC= 4.7/2.8  Prophylactic medications started po Acyclovir,Diflucan  On IV Vanco & Cefepime for neutropenic fever  Granix started on D+6  Toxicities greater than 2 : none  Patient seen by MD/NP while inpatient & until engraftment. New orders received: K+  IV replacements per Myrna Sachs continues in small amounts. Medicated with antidiarrheals x 4 this shift.      END OF SHIFT NOTE: 7p ~ 7a     Max temp  100 orally  24 I&O = +651cc  Voiding without difficulty. Appears to have rested well this HS. Continuing with current POC. Report to oncoming RN at [facility].

## 2021-01-11 NOTE — PROGRESS NOTES
[facility]ology [facility] On[facility]        Inpatient Hematology / Oncology Progress Note      Admission Date: 2021  2:40 PM  Reason for Admission/Hospital Course: Neutropenic fever ([facility] [address] [D70.9, R50.81]      24 Hour Events:  Tmax 100, VSS  Day +12 s/p Auto PBSCT  On Cef/Vanc (D4)  ANC up to 2800  RLE edema noted    Transfusions: None  Replacements: K+, Mg+      ROS:  Constitutional: negative for fever, chills, +weakness, malaise, fatigue. CV: +edema. negative for chest pain, palpitations. Respiratory:  negative for dyspnea, cough, wheezing. GI:  negative for nausea, abdominal pain. +diarrhea. 10 point review of systems is otherwise negative with the exception of the elements mentioned above in the HPI. Allergies   Allergen Reactions    Erythromycin Nausea Only    Penicillins Rash       OBJECTIVE:  Patient Vitals for the past 8 hrs:   BP Temp Pulse Resp SpO2 Weight   21 0814  97.6 °F (36.4 °C)       21 0500 113/71 99.4 °F (37.4 °C) 95 18 94 % 140 lb 3.2 oz (63.6 kg)   21 0215 132/69 98.4 °F (36.9 °C) 92 16 97 %      Temp (24hrs), Av.8 °F (37.1 °C), Min:97.6 °F (36.4 °C), Max:100 °F (37.8 °C)    No intake/output data recorded. Physical Exam:  Constitutional: Well developed, frail appearing female in no acute distress, sitting comfortably in the hospital bed. HEENT: Normocephalic and atraumatic. Oropharynx is clear, mucous membranes are moist.  Pupils are equal, round, and reactive to light. Extraocular muscles are intact. Sclerae anicteric. Skin Warm and dry. No bruising and no rash noted. No erythema. No pallor. Respiratory Lungs are clear to auscultation bilaterally without wheezes, rales or rhonchi, normal air exchange without accessory muscle use. CVS Normal rate, regular rhythm and normal S1 and S2. No murmurs, gallops, or rubs. Abdomen Soft, nontender and nondistended, normoactive bowel sounds. No palpable mass. No hepatosplenomegaly.    Neuro Grossly nonfocal with no obvious sensory or motor deficits. MSK Normal range of motion in general.  No edema and no tenderness. Psych Appropriate mood and affect. Labs:      Recent Labs     01/11/21  0219 01/10/21  0140 01/09/21  0237   WBC 4.7 0.9* 0.3*   RBC 2.52* 2.54* 2.63*   HGB 7.9* 8.0* 8.1*   HCT 24.2* 24.2* 25.3*   MCV 96.0 95.3 96.2   MCH 31.3 31.5 30.8   MCHC 32.6 33.1 32.0   RDW 13.9 13.5 13.3   PLT 23* 27* 42*   GRANS 50 28*  --    LYMPH 26 38  --    MONOS 14* 33*  --    EOS  --  0*  --    BASOS  --  1  --    IG  --  0  --    DF MANUAL AUTOMATED MANUAL   ANEU 2.8 0.3*  --    ABL 1.2 0.3*  --    ABM 0.7 0.3  --    SIDDHARTH  --  0.0  --    ABB  --  0.0  --    AIG  --  0.0  --         Recent Labs     01/11/21  0219 01/10/21  0140 01/09/21  1117 01/09/21 0237 01/08/21  0845   * 144  --  143 142   K 2.7* 3.1* 3.4* 3.1* 3.5   * 114*  --  113* 111*   CO2 22 23  --  23 24   AGAP 9 7  --  7 7   GLU 91 87  --  92 89   BUN 5* 5*  --  4* 5*   CREA 0.54* 0.43*  --  0.48* 0.50*   GFRAA >60 >60  --  >60 >60   GFRNA >60 >60  --  >60 >60   CA 7.5* 7.1*  --  7.0* 7.7*   AP 68 66  --  66 64   TP 5.1* 4.8*  --  5.2* 5.5*   ALB 2.3* 2.2*  --  2.4* 2.6*   GLOB 2.8 2.6  --  2.8 2.9   AGRAT 0.8* 0.8*  --  0.9* 0.9*   MG 1.7* 1.9  --  1.8 2.0   PHOS  --   --   --   --  3.2         Imaging:  XR CHEST PA LAT [[id number]] Collected: 01/08/21 1531   Order Status: Completed Updated: 01/08/21 1534   Narrative:     EXAM: CHEST X-RAY, 2 VIEWS     INDICATION: neutropenic fever     COMPARISON: Chest x-ray 11/24/2020     TECHNIQUE: Frontal and lateral views of the chest were obtained.       FINDINGS: Postsurgical changes in the left lung apex. Lungs are clear and the   pulmonary vasculature is normal. No pneumothorax or pleural effusion. The   cardiomediastinal silhouette is within normal limits. The osseous structures are   unremarkable.       Impression:     IMPRESSION:   No radiographic evidence of acute cardiopulmonary disease.           Medications:  Current Facility-Administered Medications   Medication Dose Route Frequency    potassium chloride 40 mEq IVPB  40 mEq IntraVENous ONCE    magic mouthwash (OLGA) oral suspension 10 mL  10 mL Oral Q4H PRN    0.45% sodium chloride infusion  50 mL/hr IntraVENous CONTINUOUS    magnesium sulfate 2 g/50 ml IVPB (premix or compounded)  2 g IntraVENous ONCE    acetaminophen (TYLENOL) tablet 650 mg  650 mg Oral Q6H PRN    vancomycin (VANCOCIN) 750 mg in 0.9% sodium chloride 250 mL (VIAL-MATE)  750 mg IntraVENous Q8H    loperamide (IMODIUM) capsule 2 mg  2 mg Oral Q4H PRN    diphenoxylate-atropine (LOMOTIL) tablet 1 Tab  1 Tab Oral QID PRN    acyclovir (ZOVIRAX) capsule 400 mg  400 mg Oral BID    pantoprazole (PROTONIX) tablet 40 mg  40 mg Oral ACB    fluconazole (DIFLUCAN) tablet 200 mg  200 mg Oral DAILY    gabapentin (NEURONTIN) capsule 300 mg  300 mg Oral TID    lisinopriL (PRINIVIL, ZESTRIL) tablet 10 mg  10 mg Oral DAILY    LORazepam (ATIVAN) tablet 1 mg  1 mg Oral Q4H PRN    traMADoL (ULTRAM) tablet 50 mg  50 mg Oral Q6H PRN    cefepime (MAXIPIME) 2 g in 0.9% sodium chloride (MBP/ADV) 100 mL MBP  2 g IntraVENous Q8H    ondansetron (ZOFRAN) injection 4 mg  4 mg IntraVENous Q4H PRN    prochlorperazine (COMPAZINE) with saline injection 10 mg  10 mg IntraVENous Q6H PRN    HYDROcodone-acetaminophen (NORCO) 5-325 mg per tablet 1 Tab  1 Tab Oral Q6H PRN    morphine injection 2 mg  2 mg IntraVENous Q4H PRN    central line flush (saline) syringe 10 mL  10 mL InterCATHeter PRN    heparin (porcine) pf 300 Units  300 Units InterCATHeter PRN         ASSESSMENT:    Problem List  Date Reviewed: 12/8/2020          Codes Class Noted    Neutropenic fever ([facility] [address] ICD-10-CM: D70.9, R50.81  ICD-9-CM: 288.00, 780.61  1/8/2021        Multiple myeloma not having achieved remission [facility]) ICD-10-CM: C90.00  ICD-9-CM: 203.00  11/24/2020        Essential hypertension ICD-10-CM: I10  ICD-9-CM: 401.9  3/29/2018        Hyperlipidemia ICD-10-CM: E78.5  ICD-9-CM: 272.4  3/29/2018        Left hip pain ICD-10-CM: M25.552  ICD-9-CM: 719.45  3/29/2018        GERD (gastroesophageal reflux disease) ICD-10-CM: K21.9  ICD-9-CM: 530.81  3/29/2018                PLAN:  MM sp auto stem cell transplant  1/9 D+10 continue granix  1/10 ANC 0.3   1/11 Day +12. ANC up to 2800. DC Granix    Diarrhea  1/9 Cdiff pending  1/10 Cdiff negative. Anti diarrheals helping. Neutropenic Fever  1/9 workup NTD. Day 2 Vanc/Cefe  1/11 Tmax 100. UCx-NG. BCx-NGTD. On Cef/Vanc (D4). Pancytopenia secondary to chemotherapy  - Transfuse prn per Libby SOPs    RLE edema  1/11 Check RLE dopp    Continue home meds  Prophylactic Antibx: Acyclovir  Libby SOPs  VTE ppx:  SCDs. AC contraindicated d/t thrombocytopenia    Goals and plan of care reviewed with the patient. All questions answered to the best of our ability. Disposition:  Anticipate discharge home on 1/13 or 1/14 if pt remains afebrile and cultures remain negative. Yasmin Oliver NP   [facility] Hematology & Oncology  [address] [place] [address]  Office : [phone]  Fax : [phone]         Attending Addendum:  I have personally performed a face to face diagnostic evaluation on this patient. I have reviewed and agree with the care plan as documented by Yasmin Oliver, N.P. 36 minutes were spent on patient care, including but not limited to, reviewing the chart and time with the patient and family, more than 50% of the time documented was spent in face-to-face contact with the patient and in the care of the patient on the floor/unit where the patient is located. My findings are as follows: She has Multiple Myeloma and is s/p Autologous PBHCT Day +12, appears weak, heart rate regular without murmurs, abdomen is non-tender, bowel sounds are positive, we will continue IV ABX and correct her electrolytes.               Jesse Boyle, MD      [facility] Hematology/Oncology  [address] [place] [address] Avenue  Office : [phone]  Fax : [phone]

## 2021-01-12 ENCOUNTER — HOSPITAL ENCOUNTER (OUTPATIENT)
Dept: INFUSION THERAPY | Age: 61
Discharge: HOME | End: 2021-01-12

## 2021-01-12 LAB
ALBUMIN SERPL-MCNC: 2.2 G/DL (ref 3.2–4.6)
ALBUMIN/GLOB SERPL: 0.8 {RATIO} (ref 1.2–3.5)
ALP SERPL-CCNC: 80 U/L (ref 50–136)
ALT SERPL-CCNC: 24 U/L (ref 12–65)
ANION GAP SERPL CALC-SCNC: 7 MMOL/L (ref 7–16)
AST SERPL-CCNC: 19 U/L (ref 15–37)
BILIRUB SERPL-MCNC: 0.1 MG/DL (ref 0.2–1.1)
BUN SERPL-MCNC: 7 MG/DL (ref 8–23)
CALCIUM SERPL-MCNC: 7.1 MG/DL (ref 8.3–10.4)
CHLORIDE SERPL-SCNC: 116 MMOL/L (ref 98–107)
CO2 SERPL-SCNC: 22 MMOL/L (ref 21–32)
CREAT SERPL-MCNC: 0.56 MG/DL (ref 0.6–1)
DIFFERENTIAL METHOD BLD: ABNORMAL
ERYTHROCYTE [DISTWIDTH] IN BLOOD BY AUTOMATED COUNT: 14.2 % (ref 11.9–14.6)
GLOBULIN SER CALC-MCNC: 2.8 G/DL (ref 2.3–3.5)
GLUCOSE SERPL-MCNC: 117 MG/DL (ref 65–100)
HCT VFR BLD AUTO: 24.4 % (ref 35.8–46.3)
HGB BLD-MCNC: 8 G/DL (ref 11.7–15.4)
LYMPHOCYTES # BLD: 1.1 K/UL (ref 0.5–4.6)
LYMPHOCYTES NFR BLD MANUAL: 16 % (ref 16–44)
MAGNESIUM SERPL-MCNC: 2 MG/DL (ref 1.8–2.4)
MCH RBC QN AUTO: 31.6 PG (ref 26.1–32.9)
MCHC RBC AUTO-ENTMCNC: 32.8 G/DL (ref 31.4–35)
MCV RBC AUTO: 96.4 FL (ref 79.6–97.8)
MONOCYTES # BLD: 1.1 K/UL (ref 0.1–1.3)
MONOCYTES NFR BLD MANUAL: 15 % (ref 3–9)
MYELOCYTES NFR BLD MANUAL: 1 %
NEUTS BAND NFR BLD MANUAL: 2 % (ref 0–6)
NEUTS SEG # BLD: 4.9 K/UL (ref 1.7–8.2)
NEUTS SEG NFR BLD MANUAL: 65 % (ref 47–75)
NRBC # BLD: 0.06 K/UL (ref 0–0.2)
PHOSPHATE SERPL-MCNC: 2 MG/DL (ref 2.3–3.7)
PLATELET # BLD AUTO: 33 K/UL (ref 150–450)
PLATELET COMMENTS,PCOM: ABNORMAL
PMV BLD AUTO: 11.8 FL (ref 9.4–12.3)
POTASSIUM SERPL-SCNC: 2.8 MMOL/L (ref 3.5–5.1)
POTASSIUM SERPL-SCNC: 3.6 MMOL/L (ref 3.5–5.1)
PROMYELOCYTES NFR BLD MANUAL: 1 %
PROT SERPL-MCNC: 5 G/DL (ref 6.3–8.2)
RBC # BLD AUTO: 2.53 M/UL (ref 4.05–5.2)
RBC MORPH BLD: ABNORMAL
RBC MORPH BLD: ABNORMAL
SODIUM SERPL-SCNC: 145 MMOL/L (ref 136–145)
VANCOMYCIN TROUGH SERPL-MCNC: 16.4 UG/ML (ref 5–20)
WBC # BLD AUTO: 7.1 K/UL (ref 4.3–11.1)
WBC MORPH BLD: ABNORMAL

## 2021-01-12 PROCEDURE — 99233 SBSQ HOSP IP/OBS HIGH 50: CPT | Performed by: INTERNAL MEDICINE

## 2021-01-12 PROCEDURE — 65270000029 HC RM PRIVATE

## 2021-01-12 PROCEDURE — 36591 DRAW BLOOD OFF VENOUS DEVICE: CPT

## 2021-01-12 PROCEDURE — 80202 ASSAY OF VANCOMYCIN: CPT

## 2021-01-12 PROCEDURE — 80053 COMPREHEN METABOLIC PANEL: CPT

## 2021-01-12 PROCEDURE — 85025 COMPLETE CBC W/AUTO DIFF WBC: CPT

## 2021-01-12 PROCEDURE — 74011000258 HC RX REV CODE- 258: Performed by: NURSE PRACTITIONER

## 2021-01-12 PROCEDURE — 84132 ASSAY OF SERUM POTASSIUM: CPT

## 2021-01-12 PROCEDURE — 83735 ASSAY OF MAGNESIUM: CPT

## 2021-01-12 PROCEDURE — 74011000250 HC RX REV CODE- 250: Performed by: NURSE PRACTITIONER

## 2021-01-12 PROCEDURE — 84100 ASSAY OF PHOSPHORUS: CPT

## 2021-01-12 PROCEDURE — APPSS60 APP SPLIT SHARED TIME 46-60 MINUTES: Performed by: NURSE PRACTITIONER

## 2021-01-12 PROCEDURE — 74011250637 HC RX REV CODE- 250/637: Performed by: NURSE PRACTITIONER

## 2021-01-12 PROCEDURE — 2709999900 HC NON-CHARGEABLE SUPPLY

## 2021-01-12 PROCEDURE — 77030003560 HC NDL HUBR BARD -A

## 2021-01-12 PROCEDURE — 74011250636 HC RX REV CODE- 250/636: Performed by: INTERNAL MEDICINE

## 2021-01-12 PROCEDURE — 74011250636 HC RX REV CODE- 250/636: Performed by: NURSE PRACTITIONER

## 2021-01-12 RX ORDER — POTASSIUM CHLORIDE 29.8 MG/ML
40 INJECTION INTRAVENOUS ONCE
Status: COMPLETED | OUTPATIENT
Start: 2021-01-12 | End: 2021-01-12

## 2021-01-12 RX ADMIN — LOPERAMIDE HYDROCHLORIDE 2 MG: 2 CAPSULE ORAL at 05:09

## 2021-01-12 RX ADMIN — GABAPENTIN 300 MG: 300 CAPSULE ORAL at 07:15

## 2021-01-12 RX ADMIN — VANCOMYCIN HYDROCHLORIDE 750 MG: 750 INJECTION, POWDER, LYOPHILIZED, FOR SOLUTION INTRAVENOUS at 19:30

## 2021-01-12 RX ADMIN — DIPHENOXYLATE HYDROCHLORIDE AND ATROPINE SULFATE 1 TABLET: 2.5; .025 TABLET ORAL at 21:46

## 2021-01-12 RX ADMIN — DIPHENOXYLATE HYDROCHLORIDE AND ATROPINE SULFATE 1 TABLET: 2.5; .025 TABLET ORAL at 07:15

## 2021-01-12 RX ADMIN — GABAPENTIN 300 MG: 300 CAPSULE ORAL at 21:46

## 2021-01-12 RX ADMIN — CEFEPIME HYDROCHLORIDE 2 G: 2 INJECTION, POWDER, FOR SOLUTION INTRAVENOUS at 09:38

## 2021-01-12 RX ADMIN — ACYCLOVIR 400 MG: 200 CAPSULE ORAL at 07:15

## 2021-01-12 RX ADMIN — POTASSIUM CHLORIDE 40 MEQ: 400 INJECTION, SOLUTION INTRAVENOUS at 07:16

## 2021-01-12 RX ADMIN — LOPERAMIDE HYDROCHLORIDE 2 MG: 2 CAPSULE ORAL at 10:26

## 2021-01-12 RX ADMIN — GABAPENTIN 300 MG: 300 CAPSULE ORAL at 15:04

## 2021-01-12 RX ADMIN — PANTOPRAZOLE SODIUM 40 MG: 40 TABLET, DELAYED RELEASE ORAL at 07:15

## 2021-01-12 RX ADMIN — VANCOMYCIN HYDROCHLORIDE 750 MG: 750 INJECTION, POWDER, LYOPHILIZED, FOR SOLUTION INTRAVENOUS at 04:55

## 2021-01-12 RX ADMIN — CEFEPIME HYDROCHLORIDE 2 G: 2 INJECTION, POWDER, FOR SOLUTION INTRAVENOUS at 02:15

## 2021-01-12 RX ADMIN — VANCOMYCIN HYDROCHLORIDE 750 MG: 750 INJECTION, POWDER, LYOPHILIZED, FOR SOLUTION INTRAVENOUS at 11:46

## 2021-01-12 RX ADMIN — DIPHENOXYLATE HYDROCHLORIDE AND ATROPINE SULFATE 1 TABLET: 2.5; .025 TABLET ORAL at 13:48

## 2021-01-12 RX ADMIN — POTASSIUM PHOSPHATE, MONOBASIC POTASSIUM PHOSPHATE, DIBASIC: 224; 236 INJECTION, SOLUTION, CONCENTRATE INTRAVENOUS at 09:39

## 2021-01-12 RX ADMIN — ACYCLOVIR 400 MG: 200 CAPSULE ORAL at 17:19

## 2021-01-12 RX ADMIN — PROCHLORPERAZINE EDISYLATE 10 MG: 5 INJECTION INTRAMUSCULAR; INTRAVENOUS at 15:47

## 2021-01-12 RX ADMIN — LOPERAMIDE HYDROCHLORIDE 2 MG: 2 CAPSULE ORAL at 15:04

## 2021-01-12 RX ADMIN — CEFEPIME HYDROCHLORIDE 2 G: 2 INJECTION, POWDER, FOR SOLUTION INTRAVENOUS at 17:19

## 2021-01-12 RX ADMIN — LISINOPRIL 10 MG: 5 TABLET ORAL at 07:15

## 2021-01-12 NOTE — PROGRESS NOTES
[facility] Hematology & Oncology        Inpatient Hematology / Oncology Progress Note      Admission Date: 2021  2:40 PM  Reason for Admission/Hospital Course: Neutropenic fever ([address] [D70.9, R50.81]      24 Hour Events:  Afebrile, VSS  Day +13 s/p Auto PBSCT  On Cef/Vanc (D5)  RLE +DVT  Diarrhea ongoing    Transfusions: None  Replacements: K+, Phos      ROS:  Constitutional: negative for fever, chills, +weakness, malaise, fatigue. CV: +edema. negative for chest pain, palpitations. Respiratory:  negative for dyspnea, cough, wheezing. GI:  negative for nausea, abdominal pain. +diarrhea. 10 point review of systems is otherwise negative with the exception of the elements mentioned above in the HPI. Allergies   Allergen Reactions    Erythromycin Nausea Only    Penicillins Rash       OBJECTIVE:  Patient Vitals for the past 8 hrs:   BP Temp Pulse Resp SpO2 Weight   21 0735 139/81 98.4 °F (36.9 °C) 87 18 99 % 141 lb 3.2 oz (64 kg)   21 0450 (!) 140/78 98.5 °F (36.9 °C) 100 18 94 %      Temp (24hrs), Av.6 °F (37 °C), Min:97.6 °F (36.4 °C), Max:99.3 °F (37.4 °C)     0701 -  1900  In: -   Out: 700 [Urine:600]    Physical Exam:  Constitutional: Well developed, frail appearing female in no acute distress, sitting comfortably in the hospital bed. HEENT: Normocephalic and atraumatic. Oropharynx is clear, mucous membranes are moist.  Pupils are equal, round, and reactive to light. Extraocular muscles are intact. Sclerae anicteric. Skin Warm and dry. No bruising and no rash noted. No erythema. No pallor. Respiratory Lungs are clear to auscultation bilaterally without wheezes, rales or rhonchi, normal air exchange without accessory muscle use. CVS Normal rate, regular rhythm and normal S1 and S2. No murmurs, gallops, or rubs. Abdomen Soft, nontender and nondistended, normoactive bowel sounds. No palpable mass. No hepatosplenomegaly.    Neuro Grossly nonfocal with no obvious sensory or motor deficits. MSK Normal range of motion in general.  1+ RLE edema and no tenderness. Psych Appropriate mood and affect. Labs:      Recent Labs     01/12/21  0427 01/11/21  0219 01/10/21  0140   WBC 7.1 4.7 0.9*   RBC 2.53* 2.52* 2.54*   HGB 8.0* 7.9* 8.0*   HCT 24.4* 24.2* 24.2*   MCV 96.4 96.0 95.3   MCH 31.6 31.3 31.5   MCHC 32.8 32.6 33.1   RDW 14.2 13.9 13.5   PLT 33* 23* 27*   GRANS 65 50 28*   LYMPH 16 26 38   MONOS 15* 14* 33*   EOS  --   --  0*   BASOS  --   --  1   IG  --   --  0   DF AUTOMATED MANUAL AUTOMATED   ANEU 4.9 2.8 0.3*   ABL 1.1 1.2 0.3*   ABM 1.1 0.7 0.3   SIDDHARTH  --   --  0.0   ABB  --   --  0.0   AIG  --   --  0.0        Recent Labs     01/12/21 0427 01/11/21  1904 01/11/21  0952 01/11/21  0219 01/10/21  0140     --   --  146* 144   K 2.8*  --  3.3* 2.7* 3.1*   *  --   --  115* 114*   CO2 22  --   --  22 23   AGAP 7  --   --  9 7   *  --   --  91 87   BUN 7*  --   --  5* 5*   CREA 0.56*  --   --  0.54* 0.43*   GFRAA >60  --   --  >60 >60   GFRNA >60  --   --  >60 >60   CA 7.1*  --   --  7.5* 7.1*   AP 80  --   --  68 66   TP 5.0*  --   --  5.1* 4.8*   ALB 2.2*  --   --  2.3* 2.2*   GLOB 2.8  --   --  2.8 2.6   AGRAT 0.8*  --   --  0.8* 0.8*   MG 2.0  --   --  1.7* 1.9   PHOS 2.0* 1.7*  --   --   --          Imaging:  XR CHEST PA LAT [[id number]] Collected: 01/08/21 1531   Order Status: Completed Updated: 01/08/21 1534   Narrative:     EXAM: CHEST X-RAY, 2 VIEWS     INDICATION: neutropenic fever     COMPARISON: Chest x-ray 11/24/2020     TECHNIQUE: Frontal and lateral views of the chest were obtained.       FINDINGS: Postsurgical changes in the left lung apex. Lungs are clear and the   pulmonary vasculature is normal. No pneumothorax or pleural effusion. The   cardiomediastinal silhouette is within normal limits. The osseous structures are   unremarkable.       Impression:     IMPRESSION:   No radiographic evidence of acute cardiopulmonary disease. DUPLEX LOWER EXT VENOUS RIGHT [[id number]] Collected: 01/11/21 1655   Order Status: Completed Updated: 01/11/21 1658   Narrative:     EXAMINATION: DUPLEX LOWER EXT VENOUS RIGHT 1/11/2021 4:53 PM     COMPARISON: Right lower extremity Doppler ultrasound 12/23/2020       INDICATION: RLE edema     TECHNIQUE: Doppler ultrasound of the right lower extremity was performed. FINDINGS:     There is a new echogenic appearance of the right popliteal vein. The vein is   noncompressible. There is minimal flow in the vein on Doppler imaging. The peroneal veins are newly echogenic and noncompressible. There is minimal   flow in the peroneal veins on Doppler imaging. The large venous structures of the right lower extremity are otherwise patent. The imaged portions of the left common femoral vein are patent. Impression:     IMPRESSION:     New partially occlusive thrombus in the right popliteal vein and right peroneal   veins.           Medications:  Current Facility-Administered Medications   Medication Dose Route Frequency    potassium chloride 40 mEq IVPB  40 mEq IntraVENous ONCE    potassium phosphate 30 mmol in 0.9% sodium chloride 250 mL infusion   IntraVENous ONCE    magic mouthwash (OLGA) oral suspension 10 mL  10 mL Oral Q4H PRN    0.45% sodium chloride infusion  50 mL/hr IntraVENous CONTINUOUS    acetaminophen (TYLENOL) tablet 650 mg  650 mg Oral Q6H PRN    vancomycin (VANCOCIN) 750 mg in 0.9% sodium chloride 250 mL (VIAL-MATE)  750 mg IntraVENous Q8H    loperamide (IMODIUM) capsule 2 mg  2 mg Oral Q4H PRN    diphenoxylate-atropine (LOMOTIL) tablet 1 Tab  1 Tab Oral QID PRN    acyclovir (ZOVIRAX) capsule 400 mg  400 mg Oral BID    pantoprazole (PROTONIX) tablet 40 mg  40 mg Oral ACB    gabapentin (NEURONTIN) capsule 300 mg  300 mg Oral TID    lisinopriL (PRINIVIL, ZESTRIL) tablet 10 mg  10 mg Oral DAILY    LORazepam (ATIVAN) tablet 1 mg  1 mg Oral Q4H PRN    traMADoL (ULTRAM) tablet 50 mg  50 mg Oral Q6H PRN    cefepime (MAXIPIME) 2 g in 0.9% sodium chloride (MBP/ADV) 100 mL MBP  2 g IntraVENous Q8H    ondansetron (ZOFRAN) injection 4 mg  4 mg IntraVENous Q4H PRN    prochlorperazine (COMPAZINE) with saline injection 10 mg  10 mg IntraVENous Q6H PRN    HYDROcodone-acetaminophen (NORCO) 5-325 mg per tablet 1 Tab  1 Tab Oral Q6H PRN    morphine injection 2 mg  2 mg IntraVENous Q4H PRN    central line flush (saline) syringe 10 mL  10 mL InterCATHeter PRN    heparin (porcine) pf 300 Units  300 Units InterCATHeter PRN         ASSESSMENT:    Problem List  Date Reviewed: 12/8/2020          Codes Class Noted    Neutropenic fever ([facility] ICD-10-CM: D70.9, R50.81  ICD-9-CM: 288.00, 780.61  1/8/2021        Multiple myeloma not having achieved remission ([facility] ICD-10-CM: C90.00  ICD-9-CM: 203.00  11/24/2020        Essential hypertension ICD-10-CM: I10  ICD-9-CM: 401.9  3/29/2018        Hyperlipidemia ICD-10-CM: E78.5  ICD-9-CM: 272.4  3/29/2018        Left hip pain ICD-10-CM: M25.552  ICD-9-CM: 719.45  3/29/2018        GERD (gastroesophageal reflux disease) ICD-10-CM: K21.9  ICD-9-CM: 530.81  3/29/2018                PLAN:  MM sp auto stem cell transplant  1/9 D+10 continue granix  1/10 ANC 0.3   1/11 Day +12. ANC up to 2800. DC Granix  1/12 Day +13. [address] 4900. Hgb / Plt increasing. Diarrhea  1/9 Cdiff pending  1/10 Cdiff negative. Anti diarrheals helping. Neutropenic Fever  1/9 workup NTD. Day 2 Vanc/Cefe  1/11 Tmax 100. UCx-NG. BCx-NGTD. On Cef/Vanc (D4).  1/12 Remains afebrile. BCx-NGTD. On Cef/Vanc (D5). Pancytopenia secondary to chemotherapy  - Transfuse prn per Libby SOPs    RLE edema / DVT  1/11 Check RLE dopp  1/12 RLE dopp +new partially occlusive thrombus in R popliteal vein and R peroneal veins. AC contraindicated d/t thrombocytopenia. Will need AC after plt >50k. Continue home meds  Prophylactic Antibx: Acyclovir  Libby SOPs  VTE ppx:  SCDs.  AC contraindicated d/t thrombocytopenia    Goals and plan of care reviewed with the patient. All questions answered to the best of our ability. Disposition:  Anticipate discharge home towards the end of the week. Plt will need to recover >50k so pt can begin on anticoagulation. Hypokalemia will need to be corrected as well prior to discharging home. Alexandra Winslow NP   [facility] Hematology & Oncology  [address] [place] [address]  Office : [phone]  Fax : [phone]         Attending Addendum:  I have personally performed a face to face diagnostic evaluation on this patient. I have reviewed and agree with the care plan as documented by Alexandra Winslow, [email or web address]. 36 minutes were spent on patient care, including but not limited to, reviewing the chart and time with the patient and family, more than 50% of the time documented was spent in face-to-face contact with the patient and in the care of the patient on the floor/unit where the patient is located. My findings are as follows: She has Multiple Myeloma and is s/p Autologous PBHCT Day+13, appears anxious, heart rate regular without murmurs, abdomen is non-tender, bowel sounds are positive, we will continue IV ABX and correct her electrolytes, once her Platelet count is above 50k, we will initiate anti-coagulation.               Daisha Palma MD      [facility] Hematology/Oncology  [address] [place] [address]  Office : [phone]  Fax : [phone]

## 2021-01-12 NOTE — PROGRESS NOTES
Diagnosis: MM  Transplant Date: 12/30/2020  Day: (+/-) +13. Chemo regimen used: Melphalan  Labs not resulted that need follow-up: [address]. BMT assessments and toxicities completed. WBC/ANC= 7.1/4.9. Granix started on d+6 and last dose given on 1/10/2021. Toxicities greater than 2 :none. Patient seen by MD/NP daily until engraftment or while IP. New orders received: K+ and phos repleted.   Issues:diarrhea- lomotil given x2, immodium given x3, nausea- compazine given x1 zofran given x1, no other complaints    Port dressing and needle changed per protocol     01/12/21 0735   Toxicity Grading Criteria   Hemorrhage (GI) 0   Infection 0   Fever in the Absence of Neutropenia (ANC greater than or equal to 1.0 0   Febrile Neutropenia (ANC less than 1.0) 0   Rigors/Chills 0   Mucositis Oral 0   Esophagitis 0   Dry Mouth 0   Dysphagia 0   Nausea 1   Vomiting 0   Diarrhea 2   Constipation 0   Bilirubin increased 0   VOD- Weight Gain 0   ALT (SGPT) 0   Alkaline Phosphatase increased 0   AST (SGOT) 0   GGT increased 0   Weight Loss 0   Weight Gain 0  (4.9% change)   Dehydration 0   Urine Protein- Proteinuria Not able to assess   Urine Blood - Hematuria Not able to assess   Creatinine increased 0   Bladder Spasms 0   Cystitis non-infective 0   Urinary Frequency 0   Urinary Retention 0   Cardiac Arrhythmia Not able to assess   Conduction Abnormality/AV Heart Block   (MELODY)   Palpitations 0   Prolonged QTc    (MELODY)   Supraventricular and Kayla Arrhythmia   (MELODY)   Vasovagal Episode 0   Ventricular Arrhythmia   (MELODY)   Hypotension 0   Hypertension Baseline   Neuropathy / Sensory Baseline   Headache 0   Vision 0   Dizziness 0   Syncope 0   Anxiety 0   Agitation 0   Depression 0   Fatigue 1   Insomnia 0   Pain 0   Dyspnea 0   Cough 0   Hiccups 0   Alopecia 1   Nail Changes 0   Allergic Reaction 0   Rash 0   Urticaria (Hives, Welts, Wheals) 0

## 2021-01-12 NOTE — PROGRESS NOTES
Nutrition Note    Spoke with pt today to follow up on her RD consult from 1/9/2021. Patient reported her diarrhea has remained consistent with no improvement. Noted C. Diff negative. Pt reports 1-3 instances of diarrhea per day. Pt reports lack of appetite r/t nausea. Pt states that she ate only the pancakes from her breakfast and 1.5 slices of her personal pizza for lunch. Pt reports drinking 1-2 Ensures/day. Pt likes Ensure and prefers chocolate. Plan: Add banatrol plus BID; continue Ensure Enlive TID. Pt accepted recommendation to start Banatrol at breakfast and dinner, mixed in to applesauce. Discussed plan with Angle Mike RN.     Contact: Jaimie Catalan, Dietetic Intern, [phone]

## 2021-01-12 NOTE — PROGRESS NOTES
Diagnosis: Multiple Myeloma     Auto Transplant Date: 12/30/2020  GAE: +60  Chemo regimen used: Melphalan  Labs not resulted that need follow-up: Blood Cx 01/08 = NGTD, Asp Galac, Fungitell, CMV drawn 1/11/21 & pending. BMT assessments and toxicities completed. 1/11/21  WBC/ANC= 4.7/2.8  Prophylactic medications started po Acyclovir,Diflucan  On IV Vanco & Cefepime for neutropenic fever  Granix started on D+6. D/C'd 1/11  Toxicities greater than 2 : none  Patient seen by MD/NP while inpatient & until engraftment. New orders received: RLE doppler  Ozzy Becerra continues in small amounts. Medicated with antidiarrheals x2  this shift.  RLE + DVT, Bonnie Doshi NP notified.

## 2021-01-12 NOTE — PROGRESS NOTES
EOS: Stable. No complaint of pain or nausea. Three episodes of soft loose stool. Alternating lomotil and imodium. Pt reports getting short of breath when ambulating to bathroom. Continue with POC. Day +13 auto PBSCT. Report to oncoming RN.

## 2021-01-12 NOTE — PROGRESS NOTES
Pharmacokinetic Consult to Pharmacist    Nell Walker is a 56 y.o. female being treated for febrile neutorpenia with vancomycin and cefepime. Height: 5' (152.4 cm)  Weight: 64 kg (141 lb 3.2 oz)  Lab Results   Component Value Date/Time    BUN 7 (L) 01/12/2021 04:27 AM    Creatinine 0.56 (L) 01/12/2021 04:27 AM    WBC 7.1 01/12/2021 04:27 AM      Estimated Creatinine Clearance: 69.6 mL/min (A) (by C-G formula based on SCr of 0.56 mg/dL (L)). Lab Results   Component Value Date/Time    Vancomycin,trough 16.4 01/12/2021 04:27 AM       Day 5 of vancomycin. Goal trough is 15-20. Tr = 16.4. No changes, continue 750 mg q8h. Will continue to follow patient.       Thank you,  Mohan Sprague, PharmD, [address] [place] [address]  Clinical Pharmacist  [phone]

## 2021-01-13 ENCOUNTER — HOSPITAL ENCOUNTER (OUTPATIENT)
Dept: INFUSION THERAPY | Age: 61
Discharge: HOME | End: 2021-01-13

## 2021-01-13 LAB
ALBUMIN SERPL-MCNC: 2.1 G/DL (ref 3.2–4.6)
ALBUMIN/GLOB SERPL: 0.7 {RATIO} (ref 1.2–3.5)
ALP SERPL-CCNC: 81 U/L (ref 50–136)
ALT SERPL-CCNC: 24 U/L (ref 12–65)
ANION GAP SERPL CALC-SCNC: 7 MMOL/L (ref 7–16)
AST SERPL-CCNC: 20 U/L (ref 15–37)
BACTERIA SPEC CULT: NORMAL
BACTERIA SPEC CULT: NORMAL
BILIRUB SERPL-MCNC: 0.2 MG/DL (ref 0.2–1.1)
BUN SERPL-MCNC: 9 MG/DL (ref 8–23)
CALCIUM SERPL-MCNC: 7.2 MG/DL (ref 8.3–10.4)
CHLORIDE SERPL-SCNC: 114 MMOL/L (ref 98–107)
CO2 SERPL-SCNC: 24 MMOL/L (ref 21–32)
CREAT SERPL-MCNC: 0.61 MG/DL (ref 0.6–1)
DIFFERENTIAL METHOD BLD: ABNORMAL
ERYTHROCYTE [DISTWIDTH] IN BLOOD BY AUTOMATED COUNT: 14.5 % (ref 11.9–14.6)
GGT SERPL-CCNC: 63 U/L (ref 5–55)
GLOBULIN SER CALC-MCNC: 3 G/DL (ref 2.3–3.5)
GLUCOSE SERPL-MCNC: 94 MG/DL (ref 65–100)
HCT VFR BLD AUTO: 23.1 % (ref 35.8–46.3)
HGB BLD-MCNC: 7.5 G/DL (ref 11.7–15.4)
LDH SERPL L TO P-CCNC: 281 U/L (ref 110–210)
LYMPHOCYTES # BLD: 2.1 K/UL (ref 0.5–4.6)
LYMPHOCYTES NFR BLD MANUAL: 32 % (ref 16–44)
MAGNESIUM SERPL-MCNC: 1.8 MG/DL (ref 1.8–2.4)
MCH RBC QN AUTO: 31.1 PG (ref 26.1–32.9)
MCHC RBC AUTO-ENTMCNC: 32.5 G/DL (ref 31.4–35)
MCV RBC AUTO: 95.9 FL (ref 79.6–97.8)
MONOCYTES # BLD: 0.7 K/UL (ref 0.1–1.3)
MONOCYTES NFR BLD MANUAL: 11 % (ref 3–9)
NEUTS SEG # BLD: 3.9 K/UL (ref 1.7–8.2)
NEUTS SEG NFR BLD MANUAL: 57 % (ref 47–75)
NRBC # BLD: 0.05 K/UL (ref 0–0.2)
PHOSPHATE SERPL-MCNC: 3.3 MG/DL (ref 2.3–3.7)
PLATELET # BLD AUTO: 40 K/UL (ref 150–450)
PMV BLD AUTO: 12 FL (ref 9.4–12.3)
POTASSIUM SERPL-SCNC: 3.2 MMOL/L (ref 3.5–5.1)
PROT SERPL-MCNC: 5.1 G/DL (ref 6.3–8.2)
RBC # BLD AUTO: 2.41 M/UL (ref 4.05–5.2)
RBC MORPH BLD: ABNORMAL
RBC MORPH BLD: ABNORMAL
SERVICE CMNT-IMP: NORMAL
SERVICE CMNT-IMP: NORMAL
SODIUM SERPL-SCNC: 145 MMOL/L (ref 136–145)
WBC # BLD AUTO: 6.7 K/UL (ref 4.3–11.1)
WBC MORPH BLD: ABNORMAL

## 2021-01-13 PROCEDURE — 74011250636 HC RX REV CODE- 250/636: Performed by: NURSE PRACTITIONER

## 2021-01-13 PROCEDURE — 99233 SBSQ HOSP IP/OBS HIGH 50: CPT | Performed by: INTERNAL MEDICINE

## 2021-01-13 PROCEDURE — 82977 ASSAY OF GGT: CPT

## 2021-01-13 PROCEDURE — APPSS45 APP SPLIT SHARED TIME 31-45 MINUTES: Performed by: NURSE PRACTITIONER

## 2021-01-13 PROCEDURE — 83090 ASSAY OF HOMOCYSTEINE: CPT

## 2021-01-13 PROCEDURE — 74011000258 HC RX REV CODE- 258: Performed by: NURSE PRACTITIONER

## 2021-01-13 PROCEDURE — 83735 ASSAY OF MAGNESIUM: CPT

## 2021-01-13 PROCEDURE — 86147 CARDIOLIPIN ANTIBODY EA IG: CPT

## 2021-01-13 PROCEDURE — 80053 COMPREHEN METABOLIC PANEL: CPT

## 2021-01-13 PROCEDURE — 2709999900 HC NON-CHARGEABLE SUPPLY

## 2021-01-13 PROCEDURE — 74011250636 HC RX REV CODE- 250/636: Performed by: INTERNAL MEDICINE

## 2021-01-13 PROCEDURE — 84100 ASSAY OF PHOSPHORUS: CPT

## 2021-01-13 PROCEDURE — 83615 LACTATE (LD) (LDH) ENZYME: CPT

## 2021-01-13 PROCEDURE — 81241 F5 GENE: CPT

## 2021-01-13 PROCEDURE — 74011250637 HC RX REV CODE- 250/637: Performed by: NURSE PRACTITIONER

## 2021-01-13 PROCEDURE — 65270000029 HC RM PRIVATE

## 2021-01-13 PROCEDURE — 81291 MTHFR GENE: CPT

## 2021-01-13 PROCEDURE — 81240 F2 GENE: CPT

## 2021-01-13 PROCEDURE — 36591 DRAW BLOOD OFF VENOUS DEVICE: CPT

## 2021-01-13 PROCEDURE — 85025 COMPLETE CBC W/AUTO DIFF WBC: CPT

## 2021-01-13 RX ORDER — POTASSIUM CHLORIDE 29.8 MG/ML
40 INJECTION INTRAVENOUS ONCE
Status: COMPLETED | OUTPATIENT
Start: 2021-01-13 | End: 2021-01-13

## 2021-01-13 RX ADMIN — LOPERAMIDE HYDROCHLORIDE 2 MG: 2 CAPSULE ORAL at 06:45

## 2021-01-13 RX ADMIN — LISINOPRIL 10 MG: 5 TABLET ORAL at 09:01

## 2021-01-13 RX ADMIN — ACYCLOVIR 400 MG: 200 CAPSULE ORAL at 09:01

## 2021-01-13 RX ADMIN — VANCOMYCIN HYDROCHLORIDE 750 MG: 750 INJECTION, POWDER, LYOPHILIZED, FOR SOLUTION INTRAVENOUS at 04:02

## 2021-01-13 RX ADMIN — ONDANSETRON 4 MG: 2 INJECTION INTRAMUSCULAR; INTRAVENOUS at 13:03

## 2021-01-13 RX ADMIN — CEFEPIME HYDROCHLORIDE 2 G: 2 INJECTION, POWDER, FOR SOLUTION INTRAVENOUS at 09:00

## 2021-01-13 RX ADMIN — PANTOPRAZOLE SODIUM 40 MG: 40 TABLET, DELAYED RELEASE ORAL at 06:45

## 2021-01-13 RX ADMIN — GABAPENTIN 300 MG: 300 CAPSULE ORAL at 16:30

## 2021-01-13 RX ADMIN — CEFEPIME HYDROCHLORIDE 2 G: 2 INJECTION, POWDER, FOR SOLUTION INTRAVENOUS at 02:41

## 2021-01-13 RX ADMIN — GABAPENTIN 300 MG: 300 CAPSULE ORAL at 23:29

## 2021-01-13 RX ADMIN — CEFEPIME HYDROCHLORIDE 2 G: 2 INJECTION, POWDER, FOR SOLUTION INTRAVENOUS at 17:09

## 2021-01-13 RX ADMIN — POTASSIUM CHLORIDE 40 MEQ: 400 INJECTION, SOLUTION INTRAVENOUS at 09:32

## 2021-01-13 RX ADMIN — ACYCLOVIR 400 MG: 200 CAPSULE ORAL at 17:10

## 2021-01-13 RX ADMIN — GABAPENTIN 300 MG: 300 CAPSULE ORAL at 09:01

## 2021-01-13 RX ADMIN — DIPHENOXYLATE HYDROCHLORIDE AND ATROPINE SULFATE 1 TABLET: 2.5; .025 TABLET ORAL at 09:53

## 2021-01-13 NOTE — PROGRESS NOTES
Diagnosis: MM  Transplant Date: 12/30/2020  Day: (+/-) +14. Chemo regimen used: Melphalan  BMT assessments and toxicities completed. WBC/ANC= 6.7/3. 9     Granix started on d+6 and last dose given on 1/10/2021. Toxicities greater than 2 :none. Patient seen by MD/NP daily until engraftment or while IP. New orders received: 40 KCL IV administered. Issues: Lomotil given x 1; stool now soft. No diarrhea this shift. Zofran IV given x 1 for vomiting. This RN encouraged patient to ambulate in halls but patient declined. Patient emotional about hair loss. NAD at this time.     Geovanny Dumont RN

## 2021-01-13 NOTE — PROGRESS NOTES
EOS: Stable. SOB ambulating to and from bathroom. Pt has not been ambulating in the halls this admission. Lungs diminished and pt would prefer not to use IS. Two stools this shift. Alternating imodium and lomotil. Continue with POC. Pt anticipates discharge friday or Saturday. Report to oncoming RN.

## 2021-01-13 NOTE — PROGRESS NOTES
[facility] Hematology & Oncology        Inpatient Hematology / Oncology Progress Note      Admission Date: 2021  2:40 PM  Reason for Admission/Hospital Course: Neutropenic fever ([address] [D70.9, R50.81]      24 Hour Events:  Afebrile, VSS  Day +14 s/p Auto PBSCT  On Cef/Vanc (D6)  Plt up to 40k  Diarrhea ongoing    Transfusions: None  Replacements: K+      ROS:  Constitutional: Negative for fever, chills, +weakness, malaise, fatigue. CV: +edema. Negative for chest pain, palpitations. Respiratory:  Negative for dyspnea, cough, wheezing. GI:  +nausea, diarrhea. Negative abdominal pain    10 point review of systems is otherwise negative with the exception of the elements mentioned above in the HPI. Allergies   Allergen Reactions    Erythromycin Nausea Only    Penicillins Rash       OBJECTIVE:  Patient Vitals for the past 8 hrs:   BP Temp Pulse Resp SpO2   21 0402 (!) 121/93 99.3 °F (37.4 °C) 93 16 93 %   21 0005 139/78 98.9 °F (37.2 °C) 94 18 95 %     Temp (24hrs), Av.8 °F (37.1 °C), Min:97.9 °F (36.6 °C), Max:99.3 °F (37.4 °C)    No intake/output data recorded. Physical Exam:  Constitutional: Well developed, frail appearing female in no acute distress, sitting comfortably in the hospital bed. HEENT: Normocephalic and atraumatic. Oropharynx is clear, mucous membranes are moist.  Pupils are equal, round, and reactive to light. Extraocular muscles are intact. Sclerae anicteric. Skin Warm and dry. No bruising and no rash noted. No erythema. No pallor. Respiratory Lungs are clear to auscultation bilaterally without wheezes, rales or rhonchi, normal air exchange without accessory muscle use. CVS Normal rate, regular rhythm and normal S1 and S2. No murmurs, gallops, or rubs. Abdomen Soft, nontender and nondistended, normoactive bowel sounds. No palpable mass. No hepatosplenomegaly. Neuro Grossly nonfocal with no obvious sensory or motor deficits.    MSK Normal range of motion in general.  1+ RLE edema and no tenderness. Psych Appropriate mood and affect. Labs:      Recent Labs     01/13/21 0347 01/12/21 0427 01/11/21 0219   WBC 6.7 7.1 4.7   RBC 2.41* 2.53* 2.52*   HGB 7.5* 8.0* 7.9*   HCT 23.1* 24.4* 24.2*   MCV 95.9 96.4 96.0   MCH 31.1 31.6 31.3   MCHC 32.5 32.8 32.6   RDW 14.5 14.2 13.9   PLT 40* 33* 23*   GRANS 57 65 50   LYMPH 32 16 26   MONOS 11* 15* 14*   DF MANUAL AUTOMATED MANUAL   ANEU 3.9 4.9 2.8   ABL 2.1 1.1 1.2   ABM 0.7 1.1 0.7        Recent Labs     01/13/21 0347 01/12/21  1453 01/12/21 0427 01/11/21  1904 01/11/21 0219 01/11/21 0219     --  145  --   --  146*   K 3.2* 3.6 2.8*  --    < > 2.7*   *  --  116*  --   --  115*   CO2 24  --  22  --   --  22   AGAP 7  --  7  --   --  9   GLU 94  --  117*  --   --  91   BUN 9  --  7*  --   --  5*   CREA 0.61  --  0.56*  --   --  0.54*   GFRAA >60  --  >60  --   --  >60   GFRNA >60  --  >60  --   --  >60   CA 7.2*  --  7.1*  --   --  7.5*   AP 81  --  80  --   --  68   TP 5.1*  --  5.0*  --   --  5.1*   ALB 2.1*  --  2.2*  --   --  2.3*   GLOB 3.0  --  2.8  --   --  2.8   AGRAT 0.7*  --  0.8*  --   --  0.8*   MG 1.8  --  2.0  --   --  1.7*   PHOS 3.3  --  2.0* 1.7*  --   --     < > = values in this interval not displayed. Imaging:  XR CHEST PA LAT [[id number]] Collected: 01/08/21 1531   Order Status: Completed Updated: 01/08/21 1534   Narrative:     EXAM: CHEST X-RAY, 2 VIEWS     INDICATION: neutropenic fever     COMPARISON: Chest x-ray 11/24/2020     TECHNIQUE: Frontal and lateral views of the chest were obtained.       FINDINGS: Postsurgical changes in the left lung apex. Lungs are clear and the   pulmonary vasculature is normal. No pneumothorax or pleural effusion. The   cardiomediastinal silhouette is within normal limits. The osseous structures are   unremarkable. Impression:     IMPRESSION:   No radiographic evidence of acute cardiopulmonary disease.       DUPLEX LOWER EXT VENOUS RIGHT [[id number]] Collected: 01/11/21 1655   Order Status: Completed Updated: 01/11/21 1658   Narrative:     EXAMINATION: DUPLEX LOWER EXT VENOUS RIGHT 1/11/2021 4:53 PM     COMPARISON: Right lower extremity Doppler ultrasound 12/23/2020       INDICATION: RLE edema     TECHNIQUE: Doppler ultrasound of the right lower extremity was performed. FINDINGS:     There is a new echogenic appearance of the right popliteal vein. The vein is   noncompressible. There is minimal flow in the vein on Doppler imaging. The peroneal veins are newly echogenic and noncompressible. There is minimal   flow in the peroneal veins on Doppler imaging. The large venous structures of the right lower extremity are otherwise patent. The imaged portions of the left common femoral vein are patent. Impression:     IMPRESSION:     New partially occlusive thrombus in the right popliteal vein and right peroneal   veins.           Medications:  Current Facility-Administered Medications   Medication Dose Route Frequency    potassium chloride 40 mEq IVPB  40 mEq IntraVENous ONCE    magic mouthwash (OLGA) oral suspension 10 mL  10 mL Oral Q4H PRN    0.45% sodium chloride infusion  50 mL/hr IntraVENous CONTINUOUS    acetaminophen (TYLENOL) tablet 650 mg  650 mg Oral Q6H PRN    loperamide (IMODIUM) capsule 2 mg  2 mg Oral Q4H PRN    diphenoxylate-atropine (LOMOTIL) tablet 1 Tab  1 Tab Oral QID PRN    acyclovir (ZOVIRAX) capsule 400 mg  400 mg Oral BID    pantoprazole (PROTONIX) tablet 40 mg  40 mg Oral ACB    gabapentin (NEURONTIN) capsule 300 mg  300 mg Oral TID    lisinopriL (PRINIVIL, ZESTRIL) tablet 10 mg  10 mg Oral DAILY    LORazepam (ATIVAN) tablet 1 mg  1 mg Oral Q4H PRN    traMADoL (ULTRAM) tablet 50 mg  50 mg Oral Q6H PRN    cefepime (MAXIPIME) 2 g in 0.9% sodium chloride (MBP/ADV) 100 mL MBP  2 g IntraVENous Q8H    ondansetron (ZOFRAN) injection 4 mg  4 mg IntraVENous Q4H PRN    prochlorperazine (COMPAZINE) with saline injection 10 mg  10 mg IntraVENous Q6H PRN    HYDROcodone-acetaminophen (NORCO) 5-325 mg per tablet 1 Tab  1 Tab Oral Q6H PRN    morphine injection 2 mg  2 mg IntraVENous Q4H PRN    central line flush (saline) syringe 10 mL  10 mL InterCATHeter PRN    heparin (porcine) pf 300 Units  300 Units InterCATHeter PRN         ASSESSMENT:    Problem List  Date Reviewed: 12/8/2020          Codes Class Noted    [facility] ([facility] ICD-10-CM: D70.9, R50.81  ICD-9-CM: 288.00, 780.61  1/8/2021        Multiple myeloma not having achieved remission ([facility] ICD-10-CM: C90.00  ICD-9-CM: 203.00  11/24/2020        Essential hypertension ICD-10-CM: I10  ICD-9-CM: 401.9  3/29/2018        Hyperlipidemia ICD-10-CM: E78.5  ICD-9-CM: 272.4  3/29/2018        Left hip pain ICD-10-CM: M25.552  ICD-9-CM: 719.45  3/29/2018        GERD (gastroesophageal reflux disease) ICD-10-CM: K21.9  ICD-9-CM: 530.81  3/29/2018                PLAN:  MM sp auto stem cell transplant  1/9 D+10 continue granix  1/10 ANC 0.3   1/11 Day +12. ANC up to 2800. DC Granix  1/12 Day +13. [address] 4900. Hgb / Plt increasing. 1/13 Day +14. Plt 40k. Diarrhea  1/9 Cdiff pending  1/10 Cdiff negative. Anti diarrheals helping. 1/13 Ongoing diarrhea. Utilizing antidiarrheals prn. Neutropenic Fever  1/9 workup NTD. Day 2 Vanc/Cefe  1/11 Tmax 100. UCx-NG. BCx-NGTD. On Cef/Vanc (D4).  1/12 Remains afebrile. BCx-NGTD. On Cef/Vanc (D5).  1/13 Remains afebrile. BCx-NGTD. DC Vanc. Con't Cef. Pancytopenia secondary to chemotherapy  - Transfuse prn per Libby SOPs    RLE edema / DVT  1/11 Check RLE dopp  1/12 RLE dopp +new partially occlusive thrombus in R popliteal vein and R peroneal veins. AC contraindicated d/t thrombocytopenia. Will need AC after plt >50k. 1/13 Plt 40k. Plan to start [facility] once plt >50k. Partial hypercoag w/u ordered. Continue home meds  Prophylactic Antibx: Acyclovir  Libby SOPs  VTE ppx:  SCDs.  AC contraindicated d/t thrombocytopenia    Goals and plan of care reviewed with the patient. All questions answered to the best of our ability. Disposition:  Anticipate discharge home towards the end of the week, either Fri or Sat. Plt will need to recover >50k so pt can begin on anticoagulation. Hypokalemia will need to be corrected as well prior to discharging home. Ari Edge NP   [facility] Hematology & Oncology  [address] [place] [address]  Office : [phone]  Fax : [phone]           Attending Addendum:  I have personally performed a face to face diagnostic evaluation on this patient. I have reviewed and agree with the care plan as documented by Ari Edge, N.P. 36 minutes were spent on patient care, including but not limited to, reviewing the chart and time with the patient and family, more than 50% of the time documented was spent in face-to-face contact with the patient and in the care of the patient on the floor/unit where the patient is located. My findings are as follows: She has Multiple Myeloma and is s/p Autologous PBHCT Day +14, appears anxious, heart rate regular without murmurs, abdomen is non-tender, bowel sounds are positive, we will continue IV ABX and correct her electrolytes, she will require Eliquis/Xarelto when her Platelet count rises above 50k.               Cristina Mccurdy MD      [facility] Hematology/Oncology  [address] [place] [address]  Office : [phone]  Fax : [phone]

## 2021-01-13 NOTE — PROGRESS NOTES
Chart reviewed, meet with pt, plan remains the same. Pt will d/c home with family, no needs at this time. See N.P. note below for d/c date. ...................... Verenice Coyne Disposition:  Anticipate discharge home towards the end of the week, either Fri or Sat. Plt will need to recover >50k so pt can begin on anticoagulation.   Hypokalemia will need to be corrected as well prior to discharging home.              Abdelrahman Franco, NP   [facility] Hematology & Oncology

## 2021-01-14 ENCOUNTER — HOSPITAL ENCOUNTER (OUTPATIENT)
Dept: INFUSION THERAPY | Age: 61
End: 2021-01-14

## 2021-01-14 LAB
1,3 BETA GLUCAN SER-MCNC: <31 PG/ML
ALBUMIN SERPL-MCNC: 2.2 G/DL (ref 3.2–4.6)
ALBUMIN/GLOB SERPL: 0.7 {RATIO} (ref 1.2–3.5)
ALP SERPL-CCNC: 98 U/L (ref 50–136)
ALT SERPL-CCNC: 32 U/L (ref 12–65)
ANION GAP SERPL CALC-SCNC: 6 MMOL/L (ref 7–16)
AST SERPL-CCNC: 31 U/L (ref 15–37)
BILIRUB SERPL-MCNC: 0.2 MG/DL (ref 0.2–1.1)
BUN SERPL-MCNC: 12 MG/DL (ref 8–23)
CALCIUM SERPL-MCNC: 7.6 MG/DL (ref 8.3–10.4)
CHLORIDE SERPL-SCNC: 111 MMOL/L (ref 98–107)
CMV DNA SERPL NAA+PROBE-ACNC: NEGATIVE IU/ML
CMV DNA SERPL NAA+PROBE-LOG IU: NORMAL LOG10 IU/ML
CO2 SERPL-SCNC: 26 MMOL/L (ref 21–32)
CREAT SERPL-MCNC: 0.68 MG/DL (ref 0.6–1)
DIFFERENTIAL METHOD BLD: ABNORMAL
ERYTHROCYTE [DISTWIDTH] IN BLOOD BY AUTOMATED COUNT: 14.6 % (ref 11.9–14.6)
GALACTOMANNAN AG SPEC IA-ACNC: 0.05 INDEX (ref 0–0.49)
GLOBULIN SER CALC-MCNC: 3.1 G/DL (ref 2.3–3.5)
GLUCOSE SERPL-MCNC: 91 MG/DL (ref 65–100)
HCT VFR BLD AUTO: 22.8 % (ref 35.8–46.3)
HCYS SERPL-SCNC: 12.7 UMOL/L (ref 0–17.2)
HGB BLD-MCNC: 7.2 G/DL (ref 11.7–15.4)
LYMPHOCYTES # BLD: 1.5 K/UL (ref 0.5–4.6)
LYMPHOCYTES NFR BLD MANUAL: 30 % (ref 16–44)
MAGNESIUM SERPL-MCNC: 1.8 MG/DL (ref 1.8–2.4)
MCH RBC QN AUTO: 30.4 PG (ref 26.1–32.9)
MCHC RBC AUTO-ENTMCNC: 31.6 G/DL (ref 31.4–35)
MCV RBC AUTO: 96.2 FL (ref 79.6–97.8)
METAMYELOCYTES NFR BLD MANUAL: 1 %
MONOCYTES # BLD: 0.6 K/UL (ref 0.1–1.3)
MONOCYTES NFR BLD MANUAL: 11 % (ref 3–9)
MYELOCYTES NFR BLD MANUAL: 4 %
NEUTS SEG # BLD: 3 K/UL (ref 1.7–8.2)
NEUTS SEG NFR BLD MANUAL: 54 % (ref 47–75)
NRBC # BLD: 0.03 K/UL (ref 0–0.2)
PHOSPHATE SERPL-MCNC: 3.7 MG/DL (ref 2.3–3.7)
PLATELET # BLD AUTO: 50 K/UL (ref 150–450)
PLATELET COMMENTS,PCOM: ABNORMAL
PMV BLD AUTO: 12 FL (ref 9.4–12.3)
POTASSIUM SERPL-SCNC: 3.3 MMOL/L (ref 3.5–5.1)
PROT SERPL-MCNC: 5.3 G/DL (ref 6.3–8.2)
RBC # BLD AUTO: 2.37 M/UL (ref 4.05–5.2)
RBC MORPH BLD: ABNORMAL
RBC MORPH BLD: ABNORMAL
SODIUM SERPL-SCNC: 143 MMOL/L (ref 136–145)
WBC # BLD AUTO: 5.1 K/UL (ref 4.3–11.1)
WBC MORPH BLD: ABNORMAL

## 2021-01-14 PROCEDURE — 85025 COMPLETE CBC W/AUTO DIFF WBC: CPT

## 2021-01-14 PROCEDURE — 74011000258 HC RX REV CODE- 258: Performed by: NURSE PRACTITIONER

## 2021-01-14 PROCEDURE — 2709999900 HC NON-CHARGEABLE SUPPLY

## 2021-01-14 PROCEDURE — 74011250637 HC RX REV CODE- 250/637: Performed by: NURSE PRACTITIONER

## 2021-01-14 PROCEDURE — 80053 COMPREHEN METABOLIC PANEL: CPT

## 2021-01-14 PROCEDURE — 84100 ASSAY OF PHOSPHORUS: CPT

## 2021-01-14 PROCEDURE — 99233 SBSQ HOSP IP/OBS HIGH 50: CPT | Performed by: INTERNAL MEDICINE

## 2021-01-14 PROCEDURE — 65270000029 HC RM PRIVATE

## 2021-01-14 PROCEDURE — 74011250636 HC RX REV CODE- 250/636: Performed by: NURSE PRACTITIONER

## 2021-01-14 PROCEDURE — 74011250637 HC RX REV CODE- 250/637: Performed by: INTERNAL MEDICINE

## 2021-01-14 PROCEDURE — 83735 ASSAY OF MAGNESIUM: CPT

## 2021-01-14 PROCEDURE — APPSS60 APP SPLIT SHARED TIME 46-60 MINUTES: Performed by: NURSE PRACTITIONER

## 2021-01-14 PROCEDURE — 36591 DRAW BLOOD OFF VENOUS DEVICE: CPT

## 2021-01-14 RX ORDER — APIXABAN 5 MG (74)
KIT ORAL
Qty: 1 DOSE PACK | Refills: 0 | Status: SHIPPED | OUTPATIENT
Start: 2021-01-14 | End: 2021-01-14 | Stop reason: SDUPTHER

## 2021-01-14 RX ORDER — APIXABAN 5 MG (74)
KIT ORAL
Qty: 1 DOSE PACK | Refills: 0 | Status: SHIPPED | OUTPATIENT
Start: 2021-01-14 | End: 2021-01-15

## 2021-01-14 RX ORDER — POTASSIUM CHLORIDE 29.8 MG/ML
40 INJECTION INTRAVENOUS ONCE
Status: COMPLETED | OUTPATIENT
Start: 2021-01-14 | End: 2021-01-14

## 2021-01-14 RX ADMIN — GABAPENTIN 300 MG: 300 CAPSULE ORAL at 17:14

## 2021-01-14 RX ADMIN — ONDANSETRON 4 MG: 2 INJECTION INTRAMUSCULAR; INTRAVENOUS at 22:39

## 2021-01-14 RX ADMIN — APIXABAN 10 MG: 5 TABLET, FILM COATED ORAL at 11:40

## 2021-01-14 RX ADMIN — LISINOPRIL 10 MG: 5 TABLET ORAL at 08:01

## 2021-01-14 RX ADMIN — ACETAMINOPHEN 650 MG: 325 TABLET, FILM COATED ORAL at 03:51

## 2021-01-14 RX ADMIN — CEFEPIME HYDROCHLORIDE 2 G: 2 INJECTION, POWDER, FOR SOLUTION INTRAVENOUS at 08:06

## 2021-01-14 RX ADMIN — CEFEPIME HYDROCHLORIDE 2 G: 2 INJECTION, POWDER, FOR SOLUTION INTRAVENOUS at 03:51

## 2021-01-14 RX ADMIN — POTASSIUM CHLORIDE 40 MEQ: 400 INJECTION, SOLUTION INTRAVENOUS at 09:43

## 2021-01-14 RX ADMIN — CEFEPIME HYDROCHLORIDE 2 G: 2 INJECTION, POWDER, FOR SOLUTION INTRAVENOUS at 17:14

## 2021-01-14 RX ADMIN — GABAPENTIN 300 MG: 300 CAPSULE ORAL at 22:37

## 2021-01-14 RX ADMIN — ACYCLOVIR 400 MG: 200 CAPSULE ORAL at 17:14

## 2021-01-14 RX ADMIN — APIXABAN 10 MG: 5 TABLET, FILM COATED ORAL at 17:14

## 2021-01-14 RX ADMIN — SODIUM CHLORIDE 50 ML/HR: 450 INJECTION, SOLUTION INTRAVENOUS at 17:16

## 2021-01-14 RX ADMIN — PANTOPRAZOLE SODIUM 40 MG: 40 TABLET, DELAYED RELEASE ORAL at 08:02

## 2021-01-14 RX ADMIN — GABAPENTIN 300 MG: 300 CAPSULE ORAL at 08:01

## 2021-01-14 RX ADMIN — ACYCLOVIR 400 MG: 200 CAPSULE ORAL at 08:01

## 2021-01-14 NOTE — PROGRESS NOTES
[facility] Hematology & Oncology        Inpatient Hematology / Oncology Progress Note      Admission Date: 2021  2:40 PM  Reason for Admission/Hospital Course: Neutropenic fever ([address] [D70.9, R50.81]      24 Hour Events:  Tmax 100, VSS  Day +15 s/p Auto PBSCT  Plt up to 50k  Needing encouragement to get OOB    Transfusions: None  Replacements: K+      ROS:  Constitutional: Negative for fever, chills, +weakness, malaise, fatigue. CV: +edema. Negative for chest pain, palpitations. Respiratory:  Negative for dyspnea, cough, wheezing. GI:  +nausea, diarrhea. Negative abdominal pain    10 point review of systems is otherwise negative with the exception of the elements mentioned above in the HPI. Allergies   Allergen Reactions    Erythromycin Nausea Only    Penicillins Rash       OBJECTIVE:  Patient Vitals for the past 8 hrs:   BP Temp Pulse Resp SpO2   21 0757 130/83 99 °F (37.2 °C) 88 16 94 %   21 0350 (!) 141/83 100 °F (37.8 °C) 98 16 91 %     Temp (24hrs), Av.3 °F (37.4 °C), Min:98.2 °F (36.8 °C), Max:100 °F (37.8 °C)     0701 -  1900  In: -   Out: 400 [Urine:400]    Physical Exam:  Constitutional: Well developed, frail appearing female in no acute distress, sitting comfortably in the hospital bed. HEENT: Normocephalic and atraumatic. Oropharynx is clear, mucous membranes are moist.  Pupils are equal, round, and reactive to light. Extraocular muscles are intact. Sclerae anicteric. Skin Warm and dry. No bruising and no rash noted. No erythema. No pallor. Respiratory Lungs are clear to auscultation bilaterally without wheezes, rales or rhonchi, normal air exchange without accessory muscle use. CVS Normal rate, regular rhythm and normal S1 and S2. No murmurs, gallops, or rubs. Abdomen Soft, nontender and nondistended, normoactive bowel sounds. No palpable mass. No hepatosplenomegaly. Neuro Grossly nonfocal with no obvious sensory or motor deficits.    MSK Normal range of motion in general.  1+ RLE edema and no tenderness. Psych Appropriate mood and affect. Labs:      Recent Labs     01/14/21 0331 01/13/21 0347 01/12/21 0427   WBC 5.1 6.7 7.1   RBC 2.37* 2.41* 2.53*   HGB 7.2* 7.5* 8.0*   HCT 22.8* 23.1* 24.4*   MCV 96.2 95.9 96.4   MCH 30.4 31.1 31.6   MCHC 31.6 32.5 32.8   RDW 14.6 14.5 14.2   PLT 50* 40* 33*   GRANS 54 57 65   LYMPH 30 32 16   MONOS 11* 11* 15*   DF MANUAL MANUAL AUTOMATED   ANEU 3.0 3.9 4.9   ABL 1.5 2.1 1.1   ABM 0.6 0.7 1.1        Recent Labs     01/14/21  0331 01/13/21 0347 01/12/21  1453 01/12/21 0427    145  --  145   K 3.3* 3.2* 3.6 2.8*   * 114*  --  116*   CO2 26 24  --  22   AGAP 6* 7  --  7   GLU 91 94  --  117*   BUN 12 9  --  7*   CREA 0.68 0.61  --  0.56*   GFRAA >60 >60  --  >60   GFRNA >60 >60  --  >60   CA 7.6* 7.2*  --  7.1*   AP 98 81  --  80   TP 5.3* 5.1*  --  5.0*   ALB 2.2* 2.1*  --  2.2*   GLOB 3.1 3.0  --  2.8   AGRAT 0.7* 0.7*  --  0.8*   MG 1.8 1.8  --  2.0   PHOS 3.7 3.3  --  2.0*         Imaging:  XR CHEST PA LAT [[id number]] Collected: 01/08/21 1531   Order Status: Completed Updated: 01/08/21 1534   Narrative:     EXAM: CHEST X-RAY, 2 VIEWS     INDICATION: neutropenic fever     COMPARISON: Chest x-ray 11/24/2020     TECHNIQUE: Frontal and lateral views of the chest were obtained.       FINDINGS: Postsurgical changes in the left lung apex. Lungs are clear and the   pulmonary vasculature is normal. No pneumothorax or pleural effusion. The   cardiomediastinal silhouette is within normal limits. The osseous structures are   unremarkable. Impression:     IMPRESSION:   No radiographic evidence of acute cardiopulmonary disease.       DUPLEX LOWER EXT VENOUS RIGHT [[id number]] Collected: 01/11/21 1655   Order Status: Completed Updated: 01/11/21 1658   Narrative:     EXAMINATION: DUPLEX LOWER EXT VENOUS RIGHT 1/11/2021 4:53 PM     COMPARISON: Right lower extremity Doppler ultrasound 12/23/2020     INDICATION: RLE edema     TECHNIQUE: Doppler ultrasound of the right lower extremity was performed. FINDINGS:     There is a new echogenic appearance of the right popliteal vein. The vein is   noncompressible. There is minimal flow in the vein on Doppler imaging. The peroneal veins are newly echogenic and noncompressible. There is minimal   flow in the peroneal veins on Doppler imaging. The large venous structures of the right lower extremity are otherwise patent. The imaged portions of the left common femoral vein are patent. Impression:     IMPRESSION:     New partially occlusive thrombus in the right popliteal vein and right peroneal   veins.           Medications:  Current Facility-Administered Medications   Medication Dose Route Frequency    potassium chloride 40 mEq IVPB  40 mEq IntraVENous ONCE    magic mouthwash (OLGA) oral suspension 10 mL  10 mL Oral Q4H PRN    0.45% sodium chloride infusion  50 mL/hr IntraVENous CONTINUOUS    acetaminophen (TYLENOL) tablet 650 mg  650 mg Oral Q6H PRN    loperamide (IMODIUM) capsule 2 mg  2 mg Oral Q4H PRN    diphenoxylate-atropine (LOMOTIL) tablet 1 Tab  1 Tab Oral QID PRN    acyclovir (ZOVIRAX) capsule 400 mg  400 mg Oral BID    pantoprazole (PROTONIX) tablet 40 mg  40 mg Oral ACB    gabapentin (NEURONTIN) capsule 300 mg  300 mg Oral TID    lisinopriL (PRINIVIL, ZESTRIL) tablet 10 mg  10 mg Oral DAILY    LORazepam (ATIVAN) tablet 1 mg  1 mg Oral Q4H PRN    traMADoL (ULTRAM) tablet 50 mg  50 mg Oral Q6H PRN    cefepime (MAXIPIME) 2 g in 0.9% sodium chloride (MBP/ADV) 100 mL MBP  2 g IntraVENous Q8H    ondansetron (ZOFRAN) injection 4 mg  4 mg IntraVENous Q4H PRN    prochlorperazine (COMPAZINE) with saline injection 10 mg  10 mg IntraVENous Q6H PRN    HYDROcodone-acetaminophen (NORCO) 5-325 mg per tablet 1 Tab  1 Tab Oral Q6H PRN    morphine injection 2 mg  2 mg IntraVENous Q4H PRN    central line flush (saline) syringe 10 mL  10 mL InterCATHeter PRN    heparin (porcine) pf 300 Units  300 Units InterCATHeter PRN         ASSESSMENT:    Problem List  Date Reviewed: 12/8/2020          Codes Class Noted    Neutropenic fever ([facility] ICD-10-CM: D70.9, R50.81  ICD-9-CM: 288.00, 780.61  1/8/2021        Multiple myeloma not having achieved remission ([facility] ICD-10-CM: C90.00  ICD-9-CM: 203.00  11/24/2020        Essential hypertension ICD-10-CM: I10  ICD-9-CM: 401.9  3/29/2018        Hyperlipidemia ICD-10-CM: E78.5  ICD-9-CM: 272.4  3/29/2018        Left hip pain ICD-10-CM: M25.552  ICD-9-CM: 719.45  3/29/2018        GERD (gastroesophageal reflux disease) ICD-10-CM: K21.9  ICD-9-CM: 530.81  3/29/2018                PLAN:  MM sp auto stem cell transplant  1/9 D+10 continue granix  1/10 ANC 0.3   1/11 Day +12. ANC up to 2800. DC Granix  1/12 Day +13. [address] 4900. Hgb / Plt increasing. 1/14 Day +15. Plt 50k. Diarrhea  1/9 Cdiff pending  1/10 Cdiff negative. Anti diarrheals helping. 1/13 Ongoing diarrhea. Utilizing antidiarrheals prn. Neutropenic Fever  1/9 workup NTD. Day 2 Vanc/Cefe  1/11 Tmax 100. UCx-NG. BCx-NGTD. On Cef/Vanc (D4).  1/12 Remains afebrile. BCx-NGTD. On Cef/Vanc (D5).  1/13 Remains afebrile. BCx-NGTD. DC Vanc. Con't Cef. 1/14 Tmax 100. BCx-NGTD. Con't Cef. Pancytopenia secondary to chemotherapy  - Transfuse prn per Libby SOPs    RLE edema / DVT  1/11 Check RLE dopp  1/12 RLE dopp +new partially occlusive thrombus in R popliteal vein and R peroneal veins. AC contraindicated d/t thrombocytopenia. Will need AC after plt >50k. 1/13 Plt 40k. Plan to start [facility] once plt >50k. Partial hypercoag w/u ordered. 1/14 Plt 50k. Start Eliquis (was planning tx with Xarelto, but not on pt's covered formulary). Partial hypercoag w/u pending. Continue home meds  Prophylactic Antibx: Acyclovir  Libby SOPs  VTE ppx:  SCDs. AC contraindicated d/t thrombocytopenia    Goals and plan of care reviewed with the patient.   All questions answered to the best of our ability. Disposition:  Anticipate discharge home on Sat, 1/16. Susan Dixon NP   [facility] Hematology & Oncology  [address] [place] [address]  Office : [phone]  Fax : [phone]         Attending Addendum:  I have personally performed a face to face diagnostic evaluation on this patient. I have reviewed and agree with the care plan as documented by Susan Dixon, N.P. 36 minutes were spent on patient care, including but not limited to, reviewing the chart and time with the patient and family, more than 50% of the time documented was spent in face-to-face contact with the patient and in the care of the patient on the floor/unit where the patient is located. My findings are as follows: She has Multiple Myeloma and is s/p Autologous PBHCT Day +15, appears anxious, heart rate regular without murmurs, abdomen is non-tender, bowel sounds are positive, we will start Xarelto today and continue IV ABX.               Devante Chopra MD      [facility] Hematology/Oncology  [address] [place] [address]  Office : [phone]  Fax : [phone]

## 2021-01-14 NOTE — PROGRESS NOTES
Diagnosis: MM  Transplant Date: 12/30/2020  Day: (+/-) +14. Chemo regimen used: Melphalan  BMT assessments and toxicities completed. WBC/ANC= 5.1/3.0. Granix started on d+6 and last dose given on 1/10/2021  Toxicities greater than 2 :none  New orders received: KCL 40 mEq IV administered. Issues: Low grade temperature and diminished lung sounds cleared up with pt using Incentive Spirometer. Pt ambulating in room, and drinking plenty of fluids: >8-10 cups in a 24 hour period.

## 2021-01-14 NOTE — PROGRESS NOTES
EOS: Tmax 100.0, then given Tylenol prn c/o headache. Sat 91%. Encouraged TCDB. Pt refuses IS and ambulating in halls. One soft stool this shift, no antidiarrheal given. Report to oncoming RN. Continue with POC.

## 2021-01-14 NOTE — PROGRESS NOTES
Problem: Falls - Risk of  Goal: *Absence of Falls  Description: Document Zabrina Willis Fall Risk and appropriate interventions in the flowsheet.   Outcome: Progressing Towards Goal  Note: Fall Risk Interventions:  Mobility Interventions: Communicate number of staff needed for ambulation/transfer, Patient to call before getting OOB         Medication Interventions: Patient to call before getting OOB, Teach patient to arise slowly    Elimination Interventions: Patient to call for help with toileting needs              Problem: Neutropenic Fever: Day 5  Goal: Off Pathway (Use only if patient is Off Pathway)  Outcome: Progressing Towards Goal  Goal: Activity/Safety  Outcome: Progressing Towards Goal  Goal: Consults, if ordered  Outcome: Progressing Towards Goal  Goal: Diagnostic Test/Procedures  Outcome: Progressing Towards Goal  Goal: Nutrition/Diet  Outcome: Progressing Towards Goal  Goal: Discharge Planning  Outcome: Progressing Towards Goal  Goal: Medications  Outcome: Progressing Towards Goal  Goal: Respiratory  Outcome: Progressing Towards Goal  Goal: Treatments/Interventions/Procedures  Outcome: Progressing Towards Goal  Goal: Psychosocial  Outcome: Progressing Towards Goal

## 2021-01-15 ENCOUNTER — APPOINTMENT (OUTPATIENT)
Dept: INFUSION THERAPY | Age: 61
End: 2021-01-15

## 2021-01-15 LAB
ALBUMIN SERPL-MCNC: 2.1 G/DL (ref 3.2–4.6)
ALBUMIN/GLOB SERPL: 0.7 {RATIO} (ref 1.2–3.5)
ALP SERPL-CCNC: 106 U/L (ref 50–136)
ALT SERPL-CCNC: 38 U/L (ref 12–65)
ANION GAP SERPL CALC-SCNC: 4 MMOL/L (ref 7–16)
AST SERPL-CCNC: 35 U/L (ref 15–37)
BILIRUB SERPL-MCNC: 0.2 MG/DL (ref 0.2–1.1)
BUN SERPL-MCNC: 12 MG/DL (ref 8–23)
CALCIUM SERPL-MCNC: 7.8 MG/DL (ref 8.3–10.4)
CARDIOLIPIN IGA SER IA-ACNC: <9 APL U/ML (ref 0–11)
CARDIOLIPIN IGG SER IA-ACNC: <9 GPL U/ML (ref 0–14)
CARDIOLIPIN IGM SER IA-ACNC: <9 MPL U/ML (ref 0–12)
CHLORIDE SERPL-SCNC: 112 MMOL/L (ref 98–107)
CO2 SERPL-SCNC: 28 MMOL/L (ref 21–32)
CREAT SERPL-MCNC: 0.58 MG/DL (ref 0.6–1)
DIFFERENTIAL METHOD BLD: ABNORMAL
ERYTHROCYTE [DISTWIDTH] IN BLOOD BY AUTOMATED COUNT: 14.6 % (ref 11.9–14.6)
F5 GENE MUT ANL BLD/T: NORMAL
GGT SERPL-CCNC: 121 U/L (ref 5–55)
GLOBULIN SER CALC-MCNC: 3.1 G/DL (ref 2.3–3.5)
GLUCOSE SERPL-MCNC: 95 MG/DL (ref 65–100)
HCT VFR BLD AUTO: 21.9 % (ref 35.8–46.3)
HGB BLD-MCNC: 7 G/DL (ref 11.7–15.4)
HISTORY CHECKED?,CKHIST: NORMAL
LDH SERPL L TO P-CCNC: 317 U/L (ref 110–210)
LYMPHOCYTES # BLD: 0.7 K/UL (ref 0.5–4.6)
LYMPHOCYTES NFR BLD MANUAL: 18 % (ref 16–44)
MAGNESIUM SERPL-MCNC: 1.8 MG/DL (ref 1.8–2.4)
MCH RBC QN AUTO: 30.7 PG (ref 26.1–32.9)
MCHC RBC AUTO-ENTMCNC: 32 G/DL (ref 31.4–35)
MCV RBC AUTO: 96.1 FL (ref 79.6–97.8)
METAMYELOCYTES NFR BLD MANUAL: 4 %
MONOCYTES # BLD: 0.4 K/UL (ref 0.1–1.3)
MONOCYTES NFR BLD MANUAL: 10 % (ref 3–9)
MYELOCYTES NFR BLD MANUAL: 6 %
NEUTS SEG # BLD: 2.8 K/UL (ref 1.7–8.2)
NEUTS SEG NFR BLD MANUAL: 62 % (ref 47–75)
NRBC # BLD: 0.03 K/UL (ref 0–0.2)
PHOSPHATE SERPL-MCNC: 3.5 MG/DL (ref 2.3–3.7)
PLATELET # BLD AUTO: 60 K/UL (ref 150–450)
PMV BLD AUTO: 12.3 FL (ref 9.4–12.3)
POTASSIUM SERPL-SCNC: 3.6 MMOL/L (ref 3.5–5.1)
PROT SERPL-MCNC: 5.2 G/DL (ref 6.3–8.2)
RBC # BLD AUTO: 2.28 M/UL (ref 4.05–5.2)
RBC MORPH BLD: ABNORMAL
SODIUM SERPL-SCNC: 144 MMOL/L (ref 136–145)
WBC # BLD AUTO: 3.9 K/UL (ref 4.3–11.1)
WBC MORPH BLD: ABNORMAL

## 2021-01-15 PROCEDURE — 85025 COMPLETE CBC W/AUTO DIFF WBC: CPT

## 2021-01-15 PROCEDURE — 30233N1 TRANSFUSION OF NONAUTOLOGOUS RED BLOOD CELLS INTO PERIPHERAL VEIN, PERCUTANEOUS APPROACH: ICD-10-PCS | Performed by: INTERNAL MEDICINE

## 2021-01-15 PROCEDURE — 74011000258 HC RX REV CODE- 258: Performed by: NURSE PRACTITIONER

## 2021-01-15 PROCEDURE — 74011250637 HC RX REV CODE- 250/637: Performed by: INTERNAL MEDICINE

## 2021-01-15 PROCEDURE — 99233 SBSQ HOSP IP/OBS HIGH 50: CPT | Performed by: INTERNAL MEDICINE

## 2021-01-15 PROCEDURE — 83615 LACTATE (LD) (LDH) ENZYME: CPT

## 2021-01-15 PROCEDURE — 83735 ASSAY OF MAGNESIUM: CPT

## 2021-01-15 PROCEDURE — 80053 COMPREHEN METABOLIC PANEL: CPT

## 2021-01-15 PROCEDURE — 74011250637 HC RX REV CODE- 250/637: Performed by: NURSE PRACTITIONER

## 2021-01-15 PROCEDURE — APPNB15 APP NON BILLABLE TIME 0-15 MINS: Performed by: NURSE PRACTITIONER

## 2021-01-15 PROCEDURE — 86901 BLOOD TYPING SEROLOGIC RH(D): CPT

## 2021-01-15 PROCEDURE — APPSS60 APP SPLIT SHARED TIME 46-60 MINUTES: Performed by: NURSE PRACTITIONER

## 2021-01-15 PROCEDURE — 86644 CMV ANTIBODY: CPT

## 2021-01-15 PROCEDURE — 2709999900 HC NON-CHARGEABLE SUPPLY

## 2021-01-15 PROCEDURE — 84100 ASSAY OF PHOSPHORUS: CPT

## 2021-01-15 PROCEDURE — 74011250636 HC RX REV CODE- 250/636: Performed by: NURSE PRACTITIONER

## 2021-01-15 PROCEDURE — P9040 RBC LEUKOREDUCED IRRADIATED: HCPCS

## 2021-01-15 PROCEDURE — 36430 TRANSFUSION BLD/BLD COMPNT: CPT

## 2021-01-15 PROCEDURE — 82977 ASSAY OF GGT: CPT

## 2021-01-15 PROCEDURE — 74011000250 HC RX REV CODE- 250: Performed by: NURSE PRACTITIONER

## 2021-01-15 PROCEDURE — 36591 DRAW BLOOD OFF VENOUS DEVICE: CPT

## 2021-01-15 PROCEDURE — 65270000029 HC RM PRIVATE

## 2021-01-15 PROCEDURE — 86923 COMPATIBILITY TEST ELECTRIC: CPT

## 2021-01-15 RX ORDER — SODIUM CHLORIDE 9 MG/ML
250 INJECTION, SOLUTION INTRAVENOUS AS NEEDED
Status: DISCONTINUED | OUTPATIENT
Start: 2021-01-15 | End: 2021-01-17 | Stop reason: HOSPADM

## 2021-01-15 RX ORDER — RIVAROXABAN 15 MG-20MG
KIT ORAL
Qty: 1 DOSE PACK | Refills: 0 | Status: SHIPPED | OUTPATIENT
Start: 2021-01-15 | End: 2021-01-15

## 2021-01-15 RX ORDER — DIPHENHYDRAMINE HCL 25 MG
25 CAPSULE ORAL
Status: DISCONTINUED | OUTPATIENT
Start: 2021-01-15 | End: 2021-01-17 | Stop reason: HOSPADM

## 2021-01-15 RX ORDER — APIXABAN 5 MG (74)
KIT ORAL
Qty: 1 DOSE PACK | Refills: 0 | Status: SHIPPED
Start: 2021-01-15

## 2021-01-15 RX ADMIN — CEFEPIME HYDROCHLORIDE 2 G: 2 INJECTION, POWDER, FOR SOLUTION INTRAVENOUS at 17:44

## 2021-01-15 RX ADMIN — CEFEPIME HYDROCHLORIDE 2 G: 2 INJECTION, POWDER, FOR SOLUTION INTRAVENOUS at 10:32

## 2021-01-15 RX ADMIN — PANTOPRAZOLE SODIUM 40 MG: 40 TABLET, DELAYED RELEASE ORAL at 08:55

## 2021-01-15 RX ADMIN — APIXABAN 10 MG: 5 TABLET, FILM COATED ORAL at 08:55

## 2021-01-15 RX ADMIN — SODIUM CHLORIDE 50 ML/HR: 450 INJECTION, SOLUTION INTRAVENOUS at 17:47

## 2021-01-15 RX ADMIN — ACYCLOVIR 400 MG: 200 CAPSULE ORAL at 08:56

## 2021-01-15 RX ADMIN — ACETAMINOPHEN 650 MG: 325 TABLET, FILM COATED ORAL at 08:55

## 2021-01-15 RX ADMIN — ONDANSETRON 4 MG: 2 INJECTION INTRAMUSCULAR; INTRAVENOUS at 17:47

## 2021-01-15 RX ADMIN — LISINOPRIL 10 MG: 5 TABLET ORAL at 08:55

## 2021-01-15 RX ADMIN — PROCHLORPERAZINE EDISYLATE 10 MG: 5 INJECTION INTRAMUSCULAR; INTRAVENOUS at 00:26

## 2021-01-15 RX ADMIN — GABAPENTIN 300 MG: 300 CAPSULE ORAL at 16:14

## 2021-01-15 RX ADMIN — CEFEPIME HYDROCHLORIDE 2 G: 2 INJECTION, POWDER, FOR SOLUTION INTRAVENOUS at 02:29

## 2021-01-15 RX ADMIN — GABAPENTIN 300 MG: 300 CAPSULE ORAL at 21:45

## 2021-01-15 RX ADMIN — APIXABAN 10 MG: 5 TABLET, FILM COATED ORAL at 17:44

## 2021-01-15 RX ADMIN — ACYCLOVIR 400 MG: 200 CAPSULE ORAL at 17:44

## 2021-01-15 RX ADMIN — GABAPENTIN 300 MG: 300 CAPSULE ORAL at 08:55

## 2021-01-15 RX ADMIN — DIPHENHYDRAMINE HYDROCHLORIDE 25 MG: 25 CAPSULE ORAL at 12:53

## 2021-01-15 NOTE — PROGRESS NOTES
Pt ambulated in halls twice this evening, wearing appropriate PPE/CoVid mask. Pt denies pain and nausea. Report given to oncoming RN, Aubree Rolon RN.

## 2021-01-15 NOTE — PROGRESS NOTES
Problem: Falls - Risk of  Goal: *Absence of Falls  Description: Document Pako Ag Fall Risk and appropriate interventions in the flowsheet.   Outcome: Progressing Towards Goal  Note: Fall Risk Interventions:  Mobility Interventions: Communicate number of staff needed for ambulation/transfer, Patient to call before getting OOB         Medication Interventions: Patient to call before getting OOB, Teach patient to arise slowly    Elimination Interventions: Call light in reach              Problem: Neutropenic Fever: Day 5  Goal: Off Pathway (Use only if patient is Off Pathway)  Outcome: Progressing Towards Goal  Goal: Activity/Safety  Outcome: Progressing Towards Goal  Goal: Consults, if ordered  Outcome: Progressing Towards Goal  Goal: Diagnostic Test/Procedures  Outcome: Progressing Towards Goal  Goal: Nutrition/Diet  Outcome: Progressing Towards Goal  Goal: Discharge Planning  Outcome: Progressing Towards Goal  Goal: Medications  Outcome: Progressing Towards Goal  Goal: Respiratory  Outcome: Progressing Towards Goal  Goal: Treatments/Interventions/Procedures  Outcome: Progressing Towards Goal  Goal: Psychosocial  Outcome: Progressing Towards Goal

## 2021-01-15 NOTE — PROGRESS NOTES
Diagnosis: MM  Transplant Date: 12/30/2020  Day: (+/-) +15. Chemo regimen used: Melphalan  BMT assessments and toxicities completed. WBC/ANC=3.9/2.8  Granix started on d+6 and last dose given on 1/10/2021  Toxicities greater than 2 :none  New orders received: 1 unit PRBC     Issues:Tmax 100.1. Nausea overnight, prn zofran and compazine administered. Pt reporting relief this morning. Hgb 7.0, x1 unit PRBC ordered and type and screen specimen drawn from port. No visible s/sx of distress. No concerns voiced at this time. Pt resting quietly. Will continue to monitor.

## 2021-01-15 NOTE — PROGRESS NOTES
[facility] Hematology & Oncology        Inpatient Hematology / Oncology Progress Note      Admission Date: 2021  2:40 PM  Reason for Admission/Hospital Course: Neutropenic fever ([address] [D70.9, R50.81]      24 Hour Events:  Tmax 100.1, VSS  Day +16 s/p Auto PBSCT  Up walking hallways yesterday    Transfusions: PRBCs  Replacements: None      ROS:  Constitutional: Negative for fever, chills, +weakness, malaise, fatigue. CV: +edema. Negative for chest pain, palpitations. Respiratory:  Negative for dyspnea, cough, wheezing. GI:  +nausea, diarrhea. Negative abdominal pain    10 point review of systems is otherwise negative with the exception of the elements mentioned above in the HPI. Allergies   Allergen Reactions    Erythromycin Nausea Only    Penicillins Rash       OBJECTIVE:  Patient Vitals for the past 8 hrs:   BP Temp Pulse Resp SpO2   01/15/21 0233 (!) 146/80 100.1 °F (37.8 °C) 100 16 91 %     Temp (24hrs), Av °F (37.2 °C), Min:98.6 °F (37 °C), Max:100.1 °F (37.8 °C)    No intake/output data recorded. Physical Exam:  Constitutional: Well developed, frail appearing female in no acute distress, sitting comfortably in the hospital bed. HEENT: Normocephalic and atraumatic. Oropharynx is clear, mucous membranes are moist.  Pupils are equal, round, and reactive to light. Extraocular muscles are intact. Sclerae anicteric. Skin Warm and dry. No bruising and no rash noted. No erythema. No pallor. Respiratory Lungs are clear to auscultation bilaterally without wheezes, rales or rhonchi, normal air exchange without accessory muscle use. CVS Normal rate, regular rhythm and normal S1 and S2. No murmurs, gallops, or rubs. Abdomen Soft, nontender and nondistended, normoactive bowel sounds. No palpable mass. No hepatosplenomegaly. Neuro Grossly nonfocal with no obvious sensory or motor deficits. MSK Normal range of motion in general.  1+ RLE edema and no tenderness.    Psych Appropriate mood and affect. Labs:      Recent Labs     01/15/21  0314 01/14/21  0331 01/13/21  0347   WBC 3.9* 5.1 6.7   RBC 2.28* 2.37* 2.41*   HGB 7.0* 7.2* 7.5*   HCT 21.9* 22.8* 23.1*   MCV 96.1 96.2 95.9   MCH 30.7 30.4 31.1   MCHC 32.0 31.6 32.5   RDW 14.6 14.6 14.5   PLT 60* 50* 40*   GRANS 62 54 57   LYMPH 18 30 32   MONOS 10* 11* 11*   DF MANUAL MANUAL MANUAL   ANEU 2.8 3.0 3.9   ABL 0.7 1.5 2.1   ABM 0.4 0.6 0.7        Recent Labs     01/15/21  0314 01/14/21  0331 01/13/21  0347    143 145   K 3.6 3.3* 3.2*   * 111* 114*   CO2 28 26 24   AGAP 4* 6* 7   GLU 95 91 94   BUN 12 12 9   CREA 0.58* 0.68 0.61   GFRAA >60 >60 >60   GFRNA >60 >60 >60   CA 7.8* 7.6* 7.2*    98 81   TP 5.2* 5.3* 5.1*   ALB 2.1* 2.2* 2.1*   GLOB 3.1 3.1 3.0   AGRAT 0.7* 0.7* 0.7*   MG 1.8 1.8 1.8   PHOS 3.5 3.7 3.3         Imaging:  XR CHEST PA LAT [[id number]] Collected: 01/08/21 1531   Order Status: Completed Updated: 01/08/21 1534   Narrative:     EXAM: CHEST X-RAY, 2 VIEWS     INDICATION: neutropenic fever     COMPARISON: Chest x-ray 11/24/2020     TECHNIQUE: Frontal and lateral views of the chest were obtained.       FINDINGS: Postsurgical changes in the left lung apex. Lungs are clear and the   pulmonary vasculature is normal. No pneumothorax or pleural effusion. The   cardiomediastinal silhouette is within normal limits. The osseous structures are   unremarkable. Impression:     IMPRESSION:   No radiographic evidence of acute cardiopulmonary disease. DUPLEX LOWER EXT VENOUS RIGHT [[id number]] Collected: 01/11/21 1655   Order Status: Completed Updated: 01/11/21 1658   Narrative:     EXAMINATION: DUPLEX LOWER EXT VENOUS RIGHT 1/11/2021 4:53 PM     COMPARISON: Right lower extremity Doppler ultrasound 12/23/2020       INDICATION: RLE edema     TECHNIQUE: Doppler ultrasound of the right lower extremity was performed. FINDINGS:     There is a new echogenic appearance of the right popliteal vein. The vein is   noncompressible. There is minimal flow in the vein on Doppler imaging. The peroneal veins are newly echogenic and noncompressible. There is minimal   flow in the peroneal veins on Doppler imaging. The large venous structures of the right lower extremity are otherwise patent. The imaged portions of the left common femoral vein are patent. Impression:     IMPRESSION:     New partially occlusive thrombus in the right popliteal vein and right peroneal   veins.           Medications:  Current Facility-Administered Medications   Medication Dose Route Frequency    0.9% sodium chloride infusion 250 mL  250 mL IntraVENous PRN    apixaban (ELIQUIS) tablet 10 mg  10 mg Oral BID    magic mouthwash (OLGA) oral suspension 10 mL  10 mL Oral Q4H PRN    0.45% sodium chloride infusion  50 mL/hr IntraVENous CONTINUOUS    acetaminophen (TYLENOL) tablet 650 mg  650 mg Oral Q6H PRN    loperamide (IMODIUM) capsule 2 mg  2 mg Oral Q4H PRN    diphenoxylate-atropine (LOMOTIL) tablet 1 Tab  1 Tab Oral QID PRN    acyclovir (ZOVIRAX) capsule 400 mg  400 mg Oral BID    pantoprazole (PROTONIX) tablet 40 mg  40 mg Oral ACB    gabapentin (NEURONTIN) capsule 300 mg  300 mg Oral TID    lisinopriL (PRINIVIL, ZESTRIL) tablet 10 mg  10 mg Oral DAILY    LORazepam (ATIVAN) tablet 1 mg  1 mg Oral Q4H PRN    traMADoL (ULTRAM) tablet 50 mg  50 mg Oral Q6H PRN    cefepime (MAXIPIME) 2 g in 0.9% sodium chloride (MBP/ADV) 100 mL MBP  2 g IntraVENous Q8H    ondansetron (ZOFRAN) injection 4 mg  4 mg IntraVENous Q4H PRN    prochlorperazine (COMPAZINE) with saline injection 10 mg  10 mg IntraVENous Q6H PRN    HYDROcodone-acetaminophen (NORCO) 5-325 mg per tablet 1 Tab  1 Tab Oral Q6H PRN    morphine injection 2 mg  2 mg IntraVENous Q4H PRN    central line flush (saline) syringe 10 mL  10 mL InterCATHeter PRN    heparin (porcine) pf 300 Units  300 Units InterCATHeter PRN         ASSESSMENT:    Problem List  Date Reviewed: 12/8/2020          Codes Class Noted    Neutropenic fever ([facility] ICD-10-CM: D70.9, R50.81  ICD-9-CM: 288.00, 780.61  1/8/2021        Multiple myeloma not having achieved remission ([facility] ICD-10-CM: C90.00  ICD-9-CM: 203.00  11/24/2020        Essential hypertension ICD-10-CM: I10  ICD-9-CM: 401.9  3/29/2018        Hyperlipidemia ICD-10-CM: E78.5  ICD-9-CM: 272.4  3/29/2018        Left hip pain ICD-10-CM: M25.552  ICD-9-CM: 719.45  3/29/2018        GERD (gastroesophageal reflux disease) ICD-10-CM: K21.9  ICD-9-CM: 530.81  3/29/2018                PLAN:  MM sp auto stem cell transplant  1/9 D+10 continue granix  1/10 ANC 0.3   1/11 Day +12. ANC up to 2800. DC Granix  1/12 Day +13. [address] 4900. Hgb / Plt increasing. 1/15 Day +16. Diarrhea  1/9 Cdiff pending  1/10 Cdiff negative. Anti diarrheals helping. 1/13 Ongoing diarrhea. Utilizing antidiarrheals prn.  1/14 Diarrhea much improved    Neutropenic Fever  1/9 workup NTD. Day 2 Vanc/Cefe  1/11 Tmax 100. UCx-NG. BCx-NGTD. On Cef/Vanc (D4).  1/12 Remains afebrile. BCx-NGTD. On Cef/Vanc (D5).  1/13 Remains afebrile. BCx-NGTD. DC Vanc. Con't Cef. 1/15 Tmax 100.1. BCx-NG. Con't Cef. CMV, Aspergillus, and fungitell neg. Pancytopenia secondary to chemotherapy  - Transfuse prn per [facility] SOPs    RLE edema / DVT  1/11 Check RLE dopp  1/12 RLE dopp +new partially occlusive thrombus in R popliteal vein and R peroneal veins. AC contraindicated d/t thrombocytopenia. Will need AC after plt >50k. 1/13 Plt 40k. Plan to start [facility] once plt >50k. Partial hypercoag w/u ordered. 1/14 Plt 50k. Start Eliquis (was planning tx with Xarelto, but not on pt's covered formulary). Partial hypercoag w/u pending. 1/15 Eliquis too expensive on patient's insurance. Will check on cost of Xarelto. Continue home meds  Prophylactic Antibx: Acyclovir  Libby SOPs  VTE ppx:  SCDs.  AC contraindicated d/t thrombocytopenia    Goals and plan of care reviewed with the patient. All questions answered to the best of our ability. Disposition:  Anticipate discharge home tomorrow if remains afebrile. ROSANGELA [facility]  [id number] [facility]  Office : [phone]  Fax : [phone]         Attending Addendum:  I have personally performed a face to face diagnostic evaluation on this patient. I have reviewed and agree with the care plan as documented by Kevin Davidson, N.P. 36 minutes were spent on patient care, including but not limited to, reviewing the chart and time with the patient and family, more than 50% of the time documented was spent in face-to-face contact with the patient and in the care of the patient on the floor/unit where the patient is located. My findings are as follows: She has Multiple Myeloma and is s/p Autologous PBHCT Day +16 with a DVT, appears anxious, heart rate regular without murmurs, abdomen is non-tender, bowel sounds are positive, we will continue IV ABX and transfuse PRBCs today, she will also continue Eliquis.               MD Debby Simmons Hematology/Oncology  [id number]  Office : [phone]  Fax : [phone]

## 2021-01-15 NOTE — PROGRESS NOTES
Pt with low-grade temperature, 99 F. Lungs diminished throughout. Pt given an incentive spirometer, and instructed on proper use to help improve air exchange and help prevent fevers. Pt encouraged to drink 8-10 cups of fluids daily. Pt demonstrates and verbalizes understanding. Pt concerned about hair loss, emotional support offered.

## 2021-01-15 NOTE — DISCHARGE SUMMARY
[address] [place] [address] Hematology & Oncology: Inpatient Hematology / Oncology Discharge Summary Note    Patient ID:  Debby Moreno  [id number]  13 y.o.  1960    Admit Date: 1/8/2021    Discharge Date: 1/16/2021    Admission Diagnoses: Neutropenic fever ([facility] [D70.9, R50.81]    Discharge Diagnoses:  Principal Diagnosis: <principal problem not specified>  Active Problems:    Neutropenic fever ([facility] [address] (1/8/2021)        Hospital Course:  Ms. Allison Gavin is a 56 y.o female patient admitted on 1/8/2021. She is a known patient of Dr. Vernon Casillas with multiple myeloma s/p Auto PBSCT, D+17 today. She was admitted for neutropenic fever on Day +9. Cultures NG.  CXR neg. Cdiff neg. CMV, aspergillus, and fungitell neg. She was treated with Cef/Vanc. She developed RLE edema and doppler revealed new partially occlusive thrombus in R popliteal vein and R peroneal veins. On Eliquis--has a voucher for new RX. She has remained afebrile (low grade) and is feeling well. She will continue on her prophylactic Acyclovir. She is ready for discharge home. She is scheduled to f/u with Dr. Vernon Casillas on 1/18. Advised to call with fever, chills, uncontrollable symptoms, or with any other concerns.     Consults:  None    Pertinent Diagnostic Studies:   Labs:    Recent Labs     01/16/21  0407 01/15/21  0314 01/14/21  0331   WBC 3.7* 3.9* 5.1   HGB 8.9* 7.0* 7.2*   PLT 73* 60* 50*   ANEU 1.8 2.8 3.0      Recent Labs     01/16/21  0407 01/15/21  0314 01/14/21  0331    144 143   K 3.3* 3.6 3.3*   * 112* 111*   CO2 26 28 26   GLU 88 95 91   BUN 10 12 12   CREA 0.61 0.58* 0.68   CA 7.7* 7.8* 7.6*   * 106 98   TP 5.4* 5.2* 5.3*   ALB 2.3* 2.1* 2.2*   MG 1.8 1.8 1.8   PHOS 3.6 3.5 3.7       Imaging:  DUPLEX LOWER EXT VENOUS RIGHT [[id number]] Collected: 01/11/21 1655   Order Status: Completed Updated: 01/11/21 1658   Narrative:     EXAMINATION: DUPLEX LOWER EXT VENOUS RIGHT 1/11/2021 4:53 PM     COMPARISON: Right lower extremity Doppler ultrasound 12/23/2020       INDICATION: RLE edema     TECHNIQUE: Doppler ultrasound of the right lower extremity was performed. FINDINGS:     There is a new echogenic appearance of the right popliteal vein. The vein is   noncompressible. There is minimal flow in the vein on Doppler imaging. The peroneal veins are newly echogenic and noncompressible. There is minimal   flow in the peroneal veins on Doppler imaging. The large venous structures of the right lower extremity are otherwise patent. The imaged portions of the left common femoral vein are patent. Impression:     IMPRESSION:     New partially occlusive thrombus in the right popliteal vein and right peroneal   veins. XR CHEST PA LAT [[id number]] Collected: 01/08/21 1531   Order Status: Completed Updated: 01/08/21 1534   Narrative:     EXAM: CHEST X-RAY, 2 VIEWS     INDICATION: neutropenic fever     COMPARISON: Chest x-ray 11/24/2020     TECHNIQUE: Frontal and lateral views of the chest were obtained.       FINDINGS: Postsurgical changes in the left lung apex. Lungs are clear and the   pulmonary vasculature is normal. No pneumothorax or pleural effusion. The   cardiomediastinal silhouette is within normal limits. The osseous structures are   unremarkable. Impression:     IMPRESSION:   No radiographic evidence of acute cardiopulmonary disease. Current Discharge Medication List      START taking these medications    Details   apixaban (Eliquis DVT-PE Treat 30D Start) 5 mg (74 tabs) starter pack Take 10 mg (two 5 mg tablets) by mouth twice a day for 7 days Followed by 5 mg (one 5 mg tablet) by mouth twice a day  Qty: 1 Dose Pack, Refills: 0         CONTINUE these medications which have NOT CHANGED    Details   acyclovir (ZOVIRAX) 400 mg tablet Take 1 Tab by mouth two (2) times a day.   Qty: 60 Tab, Refills: 5      gabapentin (NEURONTIN) 300 mg capsule TAKE 1 CAPSULE BY MOUTH THREE TIMES DAILY      lisinopriL (PRINIVIL, ZESTRIL) 10 mg tablet Take 1 Tab by mouth daily. Indications: high blood pressure  Qty: 90 Tab, Refills: 4    Associated Diagnoses: Essential hypertension      esomeprazole (NEXIUM) 20 mg capsule Take 1 Cap by mouth daily.  Qty: 90 Cap, Refills: 4    Associated Diagnoses: Gastroesophageal reflux disease, esophagitis presence not specified      LORazepam (ATIVAN) 1 mg tablet Take 1 Tab by mouth every four (4) hours as needed (nausea). Max Daily Amount: 6 mg. Indications: prevent nausea and vomiting from cancer chemotherapy  Qty: 30 Tab, Refills: 1    Associated Diagnoses: Chemotherapy-induced nausea      ondansetron hcl (ZOFRAN) 8 mg tablet Take 1 Tab by mouth every eight (8) hours as needed for Nausea or Vomiting. Indications: nausea and vomiting caused by cancer drugs  Qty: 30 Tab, Refills: 5      prochlorperazine (COMPAZINE) 10 mg tablet Take 1 Tab by mouth every six (6) hours as needed for Nausea. Indications: nausea and vomiting caused by cancer drugs  Qty: 30 Tab, Refills: 2      ergocalciferol (ERGOCALCIFEROL) 1,250 mcg (50,000 unit) capsule Take 1 Cap by mouth every seven (7) days.  Qty: 4 Cap, Refills: 2      traMADoL (ULTRAM) 50 mg tablet TAKE 1 TABLET BY MOUTH TWICE DAILY AS NEEDED FOR PAIN      simvastatin (ZOCOR) 20 mg tablet Take 1 Tab by mouth nightly. Indications: high cholesterol and high triglycerides  Qty: 90 Tab, Refills: 4    Associated Diagnoses: Mixed hyperlipidemia      fluticasone propionate (FLONASE) 50 mcg/actuation nasal spray 2 Sprays by Both Nostrils route daily.  Qty: 1 Bottle, Refills: 2    Associated Diagnoses: Acute non-recurrent frontal sinusitis         STOP taking these medications       fluconazole (DIFLUCAN) 200 mg tablet Comments:   Reason for Stopping:         amoxicillin-clavulanate (AUGMENTIN) 875-125 mg per tablet Comments:   Reason for Stopping:         levoFLOXacin (LEVAQUIN) 500 mg tablet Comments:   Reason for Stopping:         lenalidomide 15 mg cap Comments:   Reason for Stopping:   rivaroxaban (XARELTO) 20 mg tab tablet Comments:   Reason for Stopping:         azithromycin (ZITHROMAX) 250 mg tablet Comments:   Reason for Stopping:         diclofenac (VOLTAREN XR) 100 mg ER tablet Comments:   Reason for Stopping:               OBJECTIVE:  Patient Vitals for the past 8 hrs:   BP Temp Pulse Resp SpO2   21 0356 (!) 146/82 98.9 °F (37.2 °C) 88 18 95 %   01/15/21 2347 138/71 99.7 °F (37.6 °C) 89 18 92 %     Temp (24hrs), Av.9 °F (37.2 °C), Min:98 °F (36.7 °C), Max:99.8 °F (37.7 °C)    01/15 1901 -  0700  In: 9164 [I.V.:1447]  Out: 2400 [Urine:2400]    Physical Exam:  Constitutional: Well developed, well nourished female in no acute distress, sitting comfortably on the hospital bed. HEENT: Normocephalic and atraumatic. Oropharynx is clear, mucous membranes are moist. Extraocular muscles are intact. Sclerae anicteric. Neck supple without JVD. No thyromegaly present. Skin Warm and dry. No bruising and no rash noted. No erythema. No pallor. Respiratory Lungs are clear to auscultation bilaterally without wheezes, rales or rhonchi, normal air exchange without accessory muscle use. CVS Normal rate, regular rhythm and normal S1 and S2. No murmurs, gallops, or rubs. Abdomen Soft, nontender and nondistended, normoactive bowel sounds. No palpable mass. No hepatosplenomegaly. Neuro Grossly nonfocal with no obvious sensory or motor deficits. MSK Normal range of motion in general.  No edema and no tenderness. Psych Appropriate mood and affect. ASSESSMENT:    Active Problems:    Neutropenic fever ([address] (2021)        DISPOSITION:  Follow-up Appointments   Procedures    FOLLOW UP VISIT Appointment in: Other ([facility] Follow up with Dr. Nissa Hanna on  as previously scheduled     Follow up with Dr. Nissa Hanna on  as previously scheduled     Standing Status:   Standing     Number of Occurrences:   1     Order Specific Question:   Appointment in     Answer:    Other (Specify)       Over 30 minutes was spent in discharge planning and coordination of care.             Casey Bourne NP  [facility] Hematology & Oncology  [address] [address]  Office : [phone]  Fax : [phone]

## 2021-01-15 NOTE — PROGRESS NOTES
Care Management Interventions  PCP Verified by CM: Yes  Palliative Care Criteria Met (RRAT>21 & CHF Dx)?: No(Neutropenic fever Risk 14%)  Mode of Transport at Discharge: Self  Transition of Care Consult (CM Consult): Discharge Planning  Discharge Durable Medical Equipment: No(none)  Physical Therapy Consult: No  Occupational Therapy Consult: No  Speech Therapy Consult: No  Current Support Network: Lives with Spouse(Roshan Huerta [phone])  Confirm Follow Up Transport: Family  Discharge Location  Discharge Placement: Home with family assistance  Anticipate d/c in am pending lab results. Patient lives with her spouse and independent of ADL's. Requires no DME and has transportation. D/C plan is home with spouse when medically stable.

## 2021-01-15 NOTE — PROGRESS NOTES
Called patient's pharmacy - Xarelto would cost more than Eliquis. Will continue with Eliquis - pt rec'd voucher.       Mandie Balderas, ROSANGELA  [facility] Hematology & Oncology

## 2021-01-16 ENCOUNTER — APPOINTMENT (OUTPATIENT)
Dept: GENERAL RADIOLOGY | Age: 61
DRG: 809 | End: 2021-01-16
Attending: NURSE PRACTITIONER
Payer: COMMERCIAL

## 2021-01-16 ENCOUNTER — HOSPITAL ENCOUNTER (OUTPATIENT)
Dept: INFUSION THERAPY | Age: 61
Discharge: HOME | End: 2021-01-16

## 2021-01-16 LAB
ABO + RH BLD: NORMAL
ALBUMIN SERPL-MCNC: 2.3 G/DL (ref 3.2–4.6)
ALBUMIN/GLOB SERPL: 0.7 {RATIO} (ref 1.2–3.5)
ALP SERPL-CCNC: 139 U/L (ref 50–136)
ALT SERPL-CCNC: 69 U/L (ref 12–65)
ANION GAP SERPL CALC-SCNC: 7 MMOL/L (ref 7–16)
APPEARANCE UR: CLEAR
AST SERPL-CCNC: 71 U/L (ref 15–37)
BACTERIA URNS QL MICRO: 0 /HPF
BILIRUB SERPL-MCNC: 0.2 MG/DL (ref 0.2–1.1)
BILIRUB UR QL: NEGATIVE
BLD PROD TYP BPU: NORMAL
BLOOD GROUP ANTIBODIES SERPL: NORMAL
BPU ID: NORMAL
BUN SERPL-MCNC: 10 MG/DL (ref 8–23)
CALCIUM SERPL-MCNC: 7.7 MG/DL (ref 8.3–10.4)
CASTS URNS QL MICRO: 0 /LPF
CHLORIDE SERPL-SCNC: 109 MMOL/L (ref 98–107)
CO2 SERPL-SCNC: 26 MMOL/L (ref 21–32)
COLOR UR: YELLOW
CREAT SERPL-MCNC: 0.61 MG/DL (ref 0.6–1)
CROSSMATCH RESULT,%XM: NORMAL
DIFFERENTIAL METHOD BLD: ABNORMAL
EPI CELLS #/AREA URNS HPF: 0 /HPF
ERYTHROCYTE [DISTWIDTH] IN BLOOD BY AUTOMATED COUNT: 14.6 % (ref 11.9–14.6)
GLOBULIN SER CALC-MCNC: 3.1 G/DL (ref 2.3–3.5)
GLUCOSE SERPL-MCNC: 88 MG/DL (ref 65–100)
GLUCOSE UR STRIP.AUTO-MCNC: NEGATIVE MG/DL
HCT VFR BLD AUTO: 27.5 % (ref 35.8–46.3)
HGB BLD-MCNC: 8.9 G/DL (ref 11.7–15.4)
HGB UR QL STRIP: ABNORMAL
KETONES UR QL STRIP.AUTO: NEGATIVE MG/DL
LEUKOCYTE ESTERASE UR QL STRIP.AUTO: NEGATIVE
LYMPHOCYTES # BLD: 0.8 K/UL (ref 0.5–4.6)
LYMPHOCYTES NFR BLD MANUAL: 21 % (ref 16–44)
MAGNESIUM SERPL-MCNC: 1.8 MG/DL (ref 1.8–2.4)
MCH RBC QN AUTO: 30.4 PG (ref 26.1–32.9)
MCHC RBC AUTO-ENTMCNC: 32.4 G/DL (ref 31.4–35)
MCV RBC AUTO: 93.9 FL (ref 79.6–97.8)
METAMYELOCYTES NFR BLD MANUAL: 1 %
MONOCYTES # BLD: 1 K/UL (ref 0.1–1.3)
MONOCYTES NFR BLD MANUAL: 27 % (ref 3–9)
MYELOCYTES NFR BLD MANUAL: 4 %
NEUTS BAND NFR BLD MANUAL: 5 % (ref 0–6)
NEUTS SEG # BLD: 1.8 K/UL (ref 1.7–8.2)
NEUTS SEG NFR BLD MANUAL: 42 % (ref 47–75)
NITRITE UR QL STRIP.AUTO: NEGATIVE
NRBC # BLD: 0.02 K/UL (ref 0–0.2)
PH UR STRIP: 7 [PH] (ref 5–9)
PHOSPHATE SERPL-MCNC: 3.6 MG/DL (ref 2.3–3.7)
PLATELET # BLD AUTO: 73 K/UL (ref 150–450)
PLATELET COMMENTS,PCOM: ABNORMAL
PMV BLD AUTO: 11.7 FL (ref 9.4–12.3)
POTASSIUM SERPL-SCNC: 3.3 MMOL/L (ref 3.5–5.1)
PROT SERPL-MCNC: 5.4 G/DL (ref 6.3–8.2)
PROT UR STRIP-MCNC: NEGATIVE MG/DL
RBC # BLD AUTO: 2.93 M/UL (ref 4.05–5.2)
RBC #/AREA URNS HPF: ABNORMAL /HPF
RBC MORPH BLD: ABNORMAL
RBC MORPH BLD: ABNORMAL
SODIUM SERPL-SCNC: 142 MMOL/L (ref 136–145)
SP GR UR REFRACTOMETRY: 1.01 (ref 1–1.02)
SPECIMEN EXP DATE BLD: NORMAL
STATUS OF UNIT,%ST: NORMAL
UNIT DIVISION, %UDIV: 0
UROBILINOGEN UR QL STRIP.AUTO: 0.2 EU/DL (ref 0.2–1)
WBC # BLD AUTO: 3.7 K/UL (ref 4.3–11.1)
WBC MORPH BLD: SLIGHT
WBC URNS QL MICRO: 0 /HPF

## 2021-01-16 PROCEDURE — 80053 COMPREHEN METABOLIC PANEL: CPT

## 2021-01-16 PROCEDURE — 77010033678 HC OXYGEN DAILY

## 2021-01-16 PROCEDURE — 87040 BLOOD CULTURE FOR BACTERIA: CPT

## 2021-01-16 PROCEDURE — 71045 X-RAY EXAM CHEST 1 VIEW: CPT

## 2021-01-16 PROCEDURE — 74011250637 HC RX REV CODE- 250/637: Performed by: INTERNAL MEDICINE

## 2021-01-16 PROCEDURE — 74011250637 HC RX REV CODE- 250/637: Performed by: NURSE PRACTITIONER

## 2021-01-16 PROCEDURE — 65270000029 HC RM PRIVATE

## 2021-01-16 PROCEDURE — 74011250636 HC RX REV CODE- 250/636: Performed by: NURSE PRACTITIONER

## 2021-01-16 PROCEDURE — 85025 COMPLETE CBC W/AUTO DIFF WBC: CPT

## 2021-01-16 PROCEDURE — 83735 ASSAY OF MAGNESIUM: CPT

## 2021-01-16 PROCEDURE — 74011000258 HC RX REV CODE- 258: Performed by: NURSE PRACTITIONER

## 2021-01-16 PROCEDURE — 81001 URINALYSIS AUTO W/SCOPE: CPT

## 2021-01-16 PROCEDURE — 99233 SBSQ HOSP IP/OBS HIGH 50: CPT | Performed by: INTERNAL MEDICINE

## 2021-01-16 PROCEDURE — 2709999900 HC NON-CHARGEABLE SUPPLY

## 2021-01-16 PROCEDURE — 84100 ASSAY OF PHOSPHORUS: CPT

## 2021-01-16 PROCEDURE — 36591 DRAW BLOOD OFF VENOUS DEVICE: CPT

## 2021-01-16 RX ORDER — POTASSIUM CHLORIDE 29.8 MG/ML
40 INJECTION INTRAVENOUS ONCE
Status: COMPLETED | OUTPATIENT
Start: 2021-01-16 | End: 2021-01-16

## 2021-01-16 RX ADMIN — SODIUM CHLORIDE 50 ML/HR: 450 INJECTION, SOLUTION INTRAVENOUS at 21:40

## 2021-01-16 RX ADMIN — CEFEPIME HYDROCHLORIDE 2 G: 2 INJECTION, POWDER, FOR SOLUTION INTRAVENOUS at 01:32

## 2021-01-16 RX ADMIN — ACETAMINOPHEN 650 MG: 325 TABLET, FILM COATED ORAL at 01:31

## 2021-01-16 RX ADMIN — ACETAMINOPHEN 650 MG: 325 TABLET, FILM COATED ORAL at 15:10

## 2021-01-16 RX ADMIN — GABAPENTIN 300 MG: 300 CAPSULE ORAL at 16:00

## 2021-01-16 RX ADMIN — APIXABAN 10 MG: 5 TABLET, FILM COATED ORAL at 09:10

## 2021-01-16 RX ADMIN — GABAPENTIN 300 MG: 300 CAPSULE ORAL at 21:32

## 2021-01-16 RX ADMIN — CEFEPIME HYDROCHLORIDE 2 G: 2 INJECTION, POWDER, FOR SOLUTION INTRAVENOUS at 19:48

## 2021-01-16 RX ADMIN — LISINOPRIL 10 MG: 5 TABLET ORAL at 09:11

## 2021-01-16 RX ADMIN — ONDANSETRON 4 MG: 2 INJECTION INTRAMUSCULAR; INTRAVENOUS at 09:10

## 2021-01-16 RX ADMIN — APIXABAN 10 MG: 5 TABLET, FILM COATED ORAL at 19:48

## 2021-01-16 RX ADMIN — ACYCLOVIR 400 MG: 200 CAPSULE ORAL at 09:09

## 2021-01-16 RX ADMIN — GABAPENTIN 300 MG: 300 CAPSULE ORAL at 09:10

## 2021-01-16 RX ADMIN — POTASSIUM CHLORIDE 40 MEQ: 29.8 INJECTION, SOLUTION INTRAVENOUS at 06:18

## 2021-01-16 RX ADMIN — ACYCLOVIR 400 MG: 200 CAPSULE ORAL at 19:48

## 2021-01-16 RX ADMIN — PANTOPRAZOLE SODIUM 40 MG: 40 TABLET, DELAYED RELEASE ORAL at 09:10

## 2021-01-16 NOTE — PROGRESS NOTES
Patient is anticipated to discharge today. Plan for discharge is as follows:    Care Management Interventions  PCP Verified by CM: Yes  Palliative Care Criteria Met (RRAT>21 & CHF Dx)?: No(Neutropenic fever Risk 14%)  Mode of Transport at Discharge: Self  Transition of Care Consult (CM Consult): Discharge Planning  Discharge Durable Medical Equipment: No(none)  Physical Therapy Consult: No  Occupational Therapy Consult: No  Speech Therapy Consult: No  Current Support Network: Lives with Spouse(Roshan Jane Danube [phone])  Confirm Follow Up Transport: Family  Discharge Location  Discharge Placement: Home with family assistance    Milestones and interventions have been entered.      ERROL Rodriguez    [facility]    * [email or web address]    ( [phone]

## 2021-01-16 NOTE — PROGRESS NOTES
[employer] Hematology & Oncology        Inpatient Hematology / Oncology Progress Note      Admission Date: 2021  2:40 PM  Reason for Admission/Hospital Course: Neutropenic fever ([facility] [address] [D70.9, R50.81]      24 Hour Events:  Tmax 101.9, VSS  Day +17 s/p Auto PBSCT  Up walking hallways yesterday    Transfusions: None  Replacements: K      ROS:  Constitutional: + fever, chills, +weakness, malaise, fatigue. CV: +edema. Negative for chest pain, palpitations. Respiratory:  Negative for dyspnea, cough, wheezing. GI:  +nausea, diarrhea. Negative abdominal pain    10 point review of systems is otherwise negative with the exception of the elements mentioned above in the HPI. Allergies   Allergen Reactions    Erythromycin Nausea Only    Penicillins Rash       OBJECTIVE:  Patient Vitals for the past 8 hrs:   BP Temp Pulse Resp SpO2   21 0911 (!) 149/80  93     21 0900 (!) 149/80 100.1 °F (37.8 °C) 93 18 95 %     Temp (24hrs), Av.6 °F (37.6 °C), Min:98.9 °F (37.2 °C), Max:100.1 °F (37.8 °C)    No intake/output data recorded. Physical Exam:  Constitutional: Well developed, frail appearing female in no acute distress, sitting comfortably in the hospital bed. HEENT: Normocephalic and atraumatic. Oropharynx is clear, mucous membranes are moist.  Pupils are equal, round, and reactive to light. Extraocular muscles are intact. Sclerae anicteric. Skin Warm and dry. No bruising and no rash noted. No erythema. No pallor. Respiratory Lungs are clear to auscultation bilaterally without wheezes, rales or rhonchi, normal air exchange without accessory muscle use. CVS Normal rate, regular rhythm and normal S1 and S2. No murmurs, gallops, or rubs. Abdomen Soft, nontender and nondistended, normoactive bowel sounds. No palpable mass. No hepatosplenomegaly. Neuro Grossly nonfocal with no obvious sensory or motor deficits.    MSK Normal range of motion in general.  1+ RLE edema and no tenderness. Psych Appropriate mood and affect. Labs:      Recent Labs     01/16/21  0407 01/15/21  0314 01/14/21  0331   WBC 3.7* 3.9* 5.1   RBC 2.93* 2.28* 2.37*   HGB 8.9* 7.0* 7.2*   HCT 27.5* 21.9* 22.8*   MCV 93.9 96.1 96.2   MCH 30.4 30.7 30.4   MCHC 32.4 32.0 31.6   RDW 14.6 14.6 14.6   PLT 73* 60* 50*   GRANS 42* 62 54   LYMPH 21 18 30   MONOS 27* 10* 11*   DF MANUAL MANUAL MANUAL   ANEU 1.8 2.8 3.0   ABL 0.8 0.7 1.5   ABM 1.0 0.4 0.6        Recent Labs     01/16/21  0407 01/15/21  0314 01/14/21  0331    144 143   K 3.3* 3.6 3.3*   * 112* 111*   CO2 26 28 26   AGAP 7 4* 6*   GLU 88 95 91   BUN 10 12 12   CREA 0.61 0.58* 0.68   GFRAA >60 >60 >60   GFRNA >60 >60 >60   CA 7.7* 7.8* 7.6*   * 106 98   TP 5.4* 5.2* 5.3*   ALB 2.3* 2.1* 2.2*   GLOB 3.1 3.1 3.1   AGRAT 0.7* 0.7* 0.7*   MG 1.8 1.8 1.8   PHOS 3.6 3.5 3.7         Imaging:  XR CHEST PA LAT [[id number]] Collected: 01/08/21 1531   Order Status: Completed Updated: 01/08/21 1534   Narrative:     EXAM: CHEST X-RAY, 2 VIEWS     INDICATION: neutropenic fever     COMPARISON: Chest x-ray 11/24/2020     TECHNIQUE: Frontal and lateral views of the chest were obtained.       FINDINGS: Postsurgical changes in the left lung apex. Lungs are clear and the   pulmonary vasculature is normal. No pneumothorax or pleural effusion. The   cardiomediastinal silhouette is within normal limits. The osseous structures are   unremarkable. Impression:     IMPRESSION:   No radiographic evidence of acute cardiopulmonary disease. DUPLEX LOWER EXT VENOUS RIGHT [[id number]] Collected: 01/11/21 1655   Order Status: Completed Updated: 01/11/21 1658   Narrative:     EXAMINATION: DUPLEX LOWER EXT VENOUS RIGHT 1/11/2021 4:53 PM     COMPARISON: Right lower extremity Doppler ultrasound 12/23/2020       INDICATION: RLE edema     TECHNIQUE: Doppler ultrasound of the right lower extremity was performed.      FINDINGS:     There is a new echogenic appearance of the right popliteal vein. The vein is   noncompressible. There is minimal flow in the vein on Doppler imaging. The peroneal veins are newly echogenic and noncompressible. There is minimal   flow in the peroneal veins on Doppler imaging. The large venous structures of the right lower extremity are otherwise patent. The imaged portions of the left common femoral vein are patent. Impression:     IMPRESSION:     New partially occlusive thrombus in the right popliteal vein and right peroneal   veins.           Medications:  Current Facility-Administered Medications   Medication Dose Route Frequency    0.9% sodium chloride infusion 250 mL  250 mL IntraVENous PRN    diphenhydrAMINE (BENADRYL) capsule 25 mg  25 mg Oral Q6H PRN    apixaban (ELIQUIS) tablet 10 mg  10 mg Oral BID    magic mouthwash (OLGA) oral suspension 10 mL  10 mL Oral Q4H PRN    0.45% sodium chloride infusion  50 mL/hr IntraVENous CONTINUOUS    acetaminophen (TYLENOL) tablet 650 mg  650 mg Oral Q6H PRN    loperamide (IMODIUM) capsule 2 mg  2 mg Oral Q4H PRN    diphenoxylate-atropine (LOMOTIL) tablet 1 Tab  1 Tab Oral QID PRN    acyclovir (ZOVIRAX) capsule 400 mg  400 mg Oral BID    pantoprazole (PROTONIX) tablet 40 mg  40 mg Oral ACB    gabapentin (NEURONTIN) capsule 300 mg  300 mg Oral TID    lisinopriL (PRINIVIL, ZESTRIL) tablet 10 mg  10 mg Oral DAILY    LORazepam (ATIVAN) tablet 1 mg  1 mg Oral Q4H PRN    traMADoL (ULTRAM) tablet 50 mg  50 mg Oral Q6H PRN    cefepime (MAXIPIME) 2 g in 0.9% sodium chloride (MBP/ADV) 100 mL MBP  2 g IntraVENous Q8H    ondansetron (ZOFRAN) injection 4 mg  4 mg IntraVENous Q4H PRN    prochlorperazine (COMPAZINE) with saline injection 10 mg  10 mg IntraVENous Q6H PRN    HYDROcodone-acetaminophen (NORCO) 5-325 mg per tablet 1 Tab  1 Tab Oral Q6H PRN    morphine injection 2 mg  2 mg IntraVENous Q4H PRN    central line flush (saline) syringe 10 mL  10 mL InterCATHeter PRN    heparin (porcine) pf 300 Units  300 Units InterCATHeter PRN         ASSESSMENT:    Problem List  Date Reviewed: 12/8/2020          Codes Class Noted    Neutropenic fever ([facility] ICD-10-CM: D70.9, R50.81  ICD-9-CM: 288.00, 780.61  1/8/2021        Multiple myeloma not having achieved remission ([facility] ICD-10-CM: C90.00  ICD-9-CM: 203.00  11/24/2020        Essential hypertension ICD-10-CM: I10  ICD-9-CM: 401.9  3/29/2018        Hyperlipidemia ICD-10-CM: E78.5  ICD-9-CM: 272.4  3/29/2018        Left hip pain ICD-10-CM: M25.552  ICD-9-CM: 719.45  3/29/2018        GERD (gastroesophageal reflux disease) ICD-10-CM: K21.9  ICD-9-CM: 530.81  3/29/2018                PLAN:  MM sp auto stem cell transplant  1/9 D+10 continue granix  1/10 ANC 0.3   1/11 Day +12. ANC up to 2800. DC Granix  1/12 Day +13. [address] 4900. Hgb / Plt increasing. 1/15 Day +16.  1/16 Day + 17    Diarrhea  1/9 Cdiff pending  1/10 Cdiff negative. Anti diarrheals helping. 1/13 Ongoing diarrhea. Utilizing antidiarrheals prn.  1/14 Diarrhea much improved    Neutropenic Fever  1/9 workup NTD. Day 2 Vanc/Cefe  1/11 Tmax 100. UCx-NG. BCx-NGTD. On Cef/Vanc (D4).  1/12 Remains afebrile. BCx-NGTD. On Cef/Vanc (D5).  1/13 Remains afebrile. BCx-NGTD. DC Vanc. Con't Cef. 1/15 Tmax 100.1. BCx-NG. Con't Cef. CMV, Aspergillus, and fungitell neg. 1/16 Planned to d/c home, pt with fever of 101.9. Repeat BCx, remains on Cefepime. CXR. Pancytopenia secondary to chemotherapy  - Transfuse prn per [facility] SOPs    RLE edema / DVT  1/11 Check RLE dopp  1/12 RLE dopp +new partially occlusive thrombus in R popliteal vein and R peroneal veins. AC contraindicated d/t thrombocytopenia. Will need AC after plt >50k. 1/13 Plt 40k. Plan to start [facility] once plt >50k. Partial hypercoag w/u ordered. 1/14 Plt 50k. Start Eliquis (was planning tx with Xarelto, but not on pt's covered formulary). Partial hypercoag w/u pending. 1/15 Eliquis too expensive on patient's insurance.   Will check on cost of Xarelto. Continue home meds  Prophylactic Antibx: Acyclovir  [facility] SOPs  VTE ppx:  Eliquis    Goals and plan of care reviewed with the patient. All questions answered to the best of our ability. Disposition:  Discharge held for new development of fever today. Debbie Vazquez NP   [facility] Hematology & Oncology  [address] [place] [address]  Office : [phone]  Fax : [phone]           Attending Addendum:  I have personally performed a face to face diagnostic evaluation on this patient. I have reviewed and agree with the care plan as documented by Debbie Vazquez N.P. 36 minutes were spent on patient care, including but not limited to, reviewing the chart and time with the patient and family, more than 50% of the time documented was spent in face-to-face contact with the patient and in the care of the patient on the floor/unit where the patient is located. My findings are as follows: She has Multiple Myeloma and is Day +17 with a DVT, appears ANXIOUS, heart rate regular without murmurs, abdomen is non-tender, bowel sounds are positive, we will re-check her cultures and continue IV ABX.               Scot Ren MD      [facility] Hematology/Oncology  [address] [place] [address]  Office : [phone]  Fax : [phone]

## 2021-01-16 NOTE — PROGRESS NOTES
Problem: Falls - Risk of  Goal: *Absence of Falls  Description: Document Gomes Reason Fall Risk and appropriate interventions in the flowsheet.   Outcome: Progressing Towards Goal  Note: Fall Risk Interventions:  Mobility Interventions: Communicate number of staff needed for ambulation/transfer         Medication Interventions: Evaluate medications/consider consulting pharmacy, Teach patient to arise slowly    Elimination Interventions: Call light in reach, Patient to call for help with toileting needs, Toilet paper/wipes in reach

## 2021-01-16 NOTE — DISCHARGE SUMMARY
[address] [place] [address] Hematology & Oncology: Inpatient Hematology / Oncology Discharge Summary Note    Patient ID:  Diana Amaral  [id number]  12 y.o.  1960    Admit Date: 1/8/2021    Discharge Date: 1/17/2021    Admission Diagnoses: Neutropenic fever ([facility] [D70.9, R50.81]    Discharge Diagnoses:  Principal Diagnosis: <principal problem not specified>  Active Problems:    Neutropenic fever ([facility] (1/8/2021)        Hospital Course:  Ms. Darlin Quan is a 56 y.o female patient admitted on 1/8/2021. She is a known patient of Dr. Cuca Queen with multiple myeloma s/p Auto PBSCT, D+17 today. She was admitted for neutropenic fever on Day +9. Cultures NG.  CXR neg. Cdiff neg. CMV, aspergillus, and fungitell neg. She was treated with Cef/Vanc. She developed RLE edema and doppler revealed new partially occlusive thrombus in R popliteal vein and R peroneal veins. On Eliquis--has a voucher for new RX. She had a fever on 1/16 and blood cultures were repeated. She has since been afebrile. BCx 1/16 pending and will be followed. She had a CXR on 1/16 that showed minimal right lobe infiltrate--encouraged to use IS at home. She will continue on her prophylactic Acyclovir, and will also add Levaquin 500 daily x 1 week. She is ready for discharge home. She is scheduled to f/u with Dr. Cuca Queen on 1/18. Advised to call with fever, chills, uncontrollable symptoms, or with any other concerns.     Consults:  None    Pertinent Diagnostic Studies:   Labs:    Recent Labs     01/17/21  0256 01/16/21  0407 01/15/21  0314   WBC 3.2* 3.7* 3.9*   HGB 9.1* 8.9* 7.0*   PLT 75* 73* 60*   ANEU 1.5* 1.8 2.8      Recent Labs     01/17/21  0256 01/16/21  0407 01/15/21  0314    142 144   K 3.5 3.3* 3.6    109* 112*   CO2 27 26 28   GLU 97 88 95   BUN 10 10 12   CREA 0.50* 0.61 0.58*   CA 8.0* 7.7* 7.8*   * 139* 106   TP 5.6* 5.4* 5.2*   ALB 2.3* 2.3* 2.1*   MG 1.9 1.8 1.8   PHOS 3.3 3.6 3.5       Imaging:  XR CHEST SNGL V [[id number]] Collected: 01/16/21 1609   Order Status: Completed Updated: 01/16/21 1612   Narrative:     Chest X-ray     INDICATION: Bone marrow transplant patient, fever     A portable AP view of the chest was obtained. FINDINGS: There is a small area of developing infiltrate in the right lung base.    Left lung remains clear.  The heart size is normal.  Vascular port is present. Impression:     IMPRESSION: Minimal right lower lobe infiltrate    DUPLEX LOWER EXT VENOUS RIGHT [[id number]] Collected: 01/11/21 1655   Order Status: Completed Updated: 01/11/21 1658   Narrative:     EXAMINATION: DUPLEX LOWER EXT VENOUS RIGHT 1/11/2021 4:53 PM     COMPARISON: Right lower extremity Doppler ultrasound 12/23/2020       INDICATION: RLE edema     TECHNIQUE: Doppler ultrasound of the right lower extremity was performed. FINDINGS:     There is a new echogenic appearance of the right popliteal vein. The vein is   noncompressible. There is minimal flow in the vein on Doppler imaging. The peroneal veins are newly echogenic and noncompressible. There is minimal   flow in the peroneal veins on Doppler imaging. The large venous structures of the right lower extremity are otherwise patent. The imaged portions of the left common femoral vein are patent. Impression:     IMPRESSION:     New partially occlusive thrombus in the right popliteal vein and right peroneal   veins. XR CHEST PA LAT [[id number]] Collected: 01/08/21 1531   Order Status: Completed Updated: 01/08/21 1534   Narrative:     EXAM: CHEST X-RAY, 2 VIEWS     INDICATION: neutropenic fever     COMPARISON: Chest x-ray 11/24/2020     TECHNIQUE: Frontal and lateral views of the chest were obtained.       FINDINGS: Postsurgical changes in the left lung apex. Lungs are clear and the   pulmonary vasculature is normal. No pneumothorax or pleural effusion. The   cardiomediastinal silhouette is within normal limits. The osseous structures are   unremarkable. Impression:     IMPRESSION:   No radiographic evidence of acute cardiopulmonary disease     Current Discharge Medication List      START taking these medications    Details   apixaban (Eliquis DVT-PE Treat 30D Start) 5 mg (74 tabs) starter pack Take 10 mg (two 5 mg tablets) by mouth twice a day for 7 days Followed by 5 mg (one 5 mg tablet) by mouth twice a day  Qty: 1 Dose Pack, Refills: 0         CONTINUE these medications which have CHANGED    Details   levoFLOXacin (Levaquin) 500 mg tablet Take 1 Tab by mouth daily. Indications: fever of unknown origin, recent bone marrow transplant  Qty: 7 Tab, Refills: 0         CONTINUE these medications which have NOT CHANGED    Details   acyclovir (ZOVIRAX) 400 mg tablet Take 1 Tab by mouth two (2) times a day. Qty: 60 Tab, Refills: 5      gabapentin (NEURONTIN) 300 mg capsule TAKE 1 CAPSULE BY MOUTH THREE TIMES DAILY      lisinopriL (PRINIVIL, ZESTRIL) 10 mg tablet Take 1 Tab by mouth daily. Indications: high blood pressure  Qty: 90 Tab, Refills: 4    Associated Diagnoses: Essential hypertension      esomeprazole (NEXIUM) 20 mg capsule Take 1 Cap by mouth daily. Qty: 90 Cap, Refills: 4    Associated Diagnoses: Gastroesophageal reflux disease, esophagitis presence not specified      LORazepam (ATIVAN) 1 mg tablet Take 1 Tab by mouth every four (4) hours as needed (nausea). Max Daily Amount: 6 mg. Indications: prevent nausea and vomiting from cancer chemotherapy  Qty: 30 Tab, Refills: 1    Associated Diagnoses: Chemotherapy-induced nausea      ondansetron hcl (ZOFRAN) 8 mg tablet Take 1 Tab by mouth every eight (8) hours as needed for Nausea or Vomiting. Indications: nausea and vomiting caused by cancer drugs  Qty: 30 Tab, Refills: 5      prochlorperazine (COMPAZINE) 10 mg tablet Take 1 Tab by mouth every six (6) hours as needed for Nausea.  Indications: nausea and vomiting caused by cancer drugs  Qty: 30 Tab, Refills: 2      ergocalciferol (ERGOCALCIFEROL) 1,250 mcg (50,000 unit) capsule Take 1 Cap by mouth every seven (7) days. Qty: 4 Cap, Refills: 2      traMADoL (ULTRAM) 50 mg tablet TAKE 1 TABLET BY MOUTH TWICE DAILY AS NEEDED FOR PAIN      simvastatin (ZOCOR) 20 mg tablet Take 1 Tab by mouth nightly. Indications: high cholesterol and high triglycerides  Qty: 90 Tab, Refills: 4    Associated Diagnoses: Mixed hyperlipidemia      fluticasone propionate (FLONASE) 50 mcg/actuation nasal spray 2 Sprays by Both Nostrils route daily. Qty: 1 Bottle, Refills: 2    Associated Diagnoses: Acute non-recurrent frontal sinusitis         STOP taking these medications       fluconazole (DIFLUCAN) 200 mg tablet Comments:   Reason for Stopping:         amoxicillin-clavulanate (AUGMENTIN) 875-125 mg per tablet Comments:   Reason for Stopping:         lenalidomide 15 mg cap Comments:   Reason for Stopping:         rivaroxaban (XARELTO) 20 mg tab tablet Comments:   Reason for Stopping:         azithromycin (ZITHROMAX) 250 mg tablet Comments:   Reason for Stopping:         diclofenac (VOLTAREN XR) 100 mg ER tablet Comments:   Reason for Stopping:             OBJECTIVE:  Patient Vitals for the past 8 hrs:   BP Temp Pulse Resp SpO2   21 0936 (!) 149/79 99 °F (37.2 °C) 93 18 96 %     Temp (24hrs), Av.9 °F (37.7 °C), Min:98.9 °F (37.2 °C), Max:101.9 °F (38.8 °C)    No intake/output data recorded. Physical Exam:  Constitutional: Well developed, well nourished female in no acute distress, sitting comfortably on the examination table. HEENT: Normocephalic and atraumatic. Oropharynx is clear, mucous membranes are moist.  Pupils are equal, round, and reactive to light. Extraocular muscles are intact. Sclerae anicteric. Neck supple without JVD. No thyromegaly present. Lymph node   Deferred. Skin Warm and dry. No bruising and no rash noted. No erythema. No pallor.     Respiratory Lungs are clear to auscultation bilaterally without wheezes, rales or rhonchi, normal air exchange without accessory muscle use. CVS Normal rate, regular rhythm and normal S1 and S2. No murmurs, gallops, or rubs. Abdomen Soft, nontender and nondistended, normoactive bowel sounds. No palpable mass. No hepatosplenomegaly. Neuro Grossly nonfocal with no obvious sensory or motor deficits. MSK Normal range of motion in general.  No edema and no tenderness. Psych Appropriate mood and affect. ASSESSMENT:    Active Problems:    Neutropenic fever ([address] (1/8/2021)        DISPOSITION:  Follow-up Appointments   Procedures    FOLLOW UP VISIT Appointment in: Other (Chely Abdullahi) Follow up with Dr. Wilmer Vargas on 1/18 as previously scheduled     Follow up with Dr. Wilmer Vargas on 1/18 as previously scheduled     Standing Status:   Standing     Number of Occurrences:   1     Order Specific Question:   Appointment in     Answer: Other (Specify)       Over 30 minutes was spent in discharge planning and coordination of care. David Dyer NP  [facility] Hematology & Oncology  [address] [place] [address]  Office : [phone]  Fax : [phone]         Attending Addendum:  I have personally performed a face to face diagnostic evaluation on this patient. I have reviewed and agree with the care plan as documented by David Dyer, NGERTRUDE. My findings are as follows: She has Multiple Myeloma and DVT, appears anxious, heart rate regular without murmurs, abdomen is non-tender, bowel sounds are positive, we will discharge her on PO Levaquin, prophylactic Acyclovir and Eliquis; her partial Hypercoagulable work-up is pending.               Candy Tolbert MD      [facility] Hematology/Oncology  [address] [place] [address]  Office : [phone]  Fax : [phone]

## 2021-01-16 NOTE — PROGRESS NOTES
Diagnosis: MM  Transplant Date: 12/30/2020  Day: (+/-) +16. Chemo regimen used: Melphalan  BMT assessments and toxicities completed. WBC/ANC=3.7/1.8  Granix started on d+6 and last dose given on 1/10/2021  Toxicities greater than 2 :none  New orders received: urinalysis. 40 meq of K+     Issues: Tmax 99.8. Pt denies nausea. Headache overnight, tylenol given. Malodorous urine noted, UA ordered, other than trace blood UA neg. K+ 3.3, bolus ordered by NP. Pt resting quietly. No visible s/sx of distress. No needs voiced at this time.

## 2021-01-17 VITALS
SYSTOLIC BLOOD PRESSURE: 149 MMHG | DIASTOLIC BLOOD PRESSURE: 79 MMHG | BODY MASS INDEX: 27.09 KG/M2 | HEART RATE: 93 BPM | HEIGHT: 60 IN | OXYGEN SATURATION: 96 % | TEMPERATURE: 99 F | RESPIRATION RATE: 18 BRPM | WEIGHT: 138 LBS

## 2021-01-17 LAB
ALBUMIN SERPL-MCNC: 2.3 G/DL (ref 3.2–4.6)
ALBUMIN/GLOB SERPL: 0.7 {RATIO} (ref 1.2–3.5)
ALP SERPL-CCNC: 178 U/L (ref 50–136)
ALT SERPL-CCNC: 109 U/L (ref 12–65)
ANION GAP SERPL CALC-SCNC: 5 MMOL/L (ref 7–16)
AST SERPL-CCNC: 91 U/L (ref 15–37)
BASOPHILS # BLD: 0 K/UL (ref 0–0.2)
BASOPHILS NFR BLD: 1 % (ref 0–2)
BILIRUB SERPL-MCNC: 0.2 MG/DL (ref 0.2–1.1)
BUN SERPL-MCNC: 10 MG/DL (ref 8–23)
CALCIUM SERPL-MCNC: 8 MG/DL (ref 8.3–10.4)
CHLORIDE SERPL-SCNC: 107 MMOL/L (ref 98–107)
CO2 SERPL-SCNC: 27 MMOL/L (ref 21–32)
CREAT SERPL-MCNC: 0.5 MG/DL (ref 0.6–1)
DIFFERENTIAL METHOD BLD: ABNORMAL
EOSINOPHIL # BLD: 0 K/UL (ref 0–0.8)
EOSINOPHIL NFR BLD: 0 % (ref 0.5–7.8)
ERYTHROCYTE [DISTWIDTH] IN BLOOD BY AUTOMATED COUNT: 14.7 % (ref 11.9–14.6)
GLOBULIN SER CALC-MCNC: 3.3 G/DL (ref 2.3–3.5)
GLUCOSE SERPL-MCNC: 97 MG/DL (ref 65–100)
HCT VFR BLD AUTO: 27.5 % (ref 35.8–46.3)
HGB BLD-MCNC: 9.1 G/DL (ref 11.7–15.4)
IMM GRANULOCYTES # BLD AUTO: 0.2 K/UL (ref 0–0.5)
IMM GRANULOCYTES NFR BLD AUTO: 5 % (ref 0–5)
LYMPHOCYTES # BLD: 0.8 K/UL (ref 0.5–4.6)
LYMPHOCYTES NFR BLD: 25 % (ref 13–44)
MAGNESIUM SERPL-MCNC: 1.9 MG/DL (ref 1.8–2.4)
MCH RBC QN AUTO: 31.2 PG (ref 26.1–32.9)
MCHC RBC AUTO-ENTMCNC: 33.1 G/DL (ref 31.4–35)
MCV RBC AUTO: 94.2 FL (ref 79.6–97.8)
MONOCYTES # BLD: 0.7 K/UL (ref 0.1–1.3)
MONOCYTES NFR BLD: 21 % (ref 4–12)
NEUTS SEG # BLD: 1.5 K/UL (ref 1.7–8.2)
NEUTS SEG NFR BLD: 48 % (ref 43–78)
NRBC # BLD: 0.02 K/UL (ref 0–0.2)
PHOSPHATE SERPL-MCNC: 3.3 MG/DL (ref 2.3–3.7)
PLATELET # BLD AUTO: 75 K/UL (ref 150–450)
PMV BLD AUTO: 12 FL (ref 9.4–12.3)
POTASSIUM SERPL-SCNC: 3.5 MMOL/L (ref 3.5–5.1)
PROT SERPL-MCNC: 5.6 G/DL (ref 6.3–8.2)
RBC # BLD AUTO: 2.92 M/UL (ref 4.05–5.2)
SODIUM SERPL-SCNC: 139 MMOL/L (ref 136–145)
WBC # BLD AUTO: 3.2 K/UL (ref 4.3–11.1)

## 2021-01-17 PROCEDURE — 74011000258 HC RX REV CODE- 258: Performed by: NURSE PRACTITIONER

## 2021-01-17 PROCEDURE — 99239 HOSP IP/OBS DSCHRG MGMT >30: CPT | Performed by: INTERNAL MEDICINE

## 2021-01-17 PROCEDURE — 80053 COMPREHEN METABOLIC PANEL: CPT

## 2021-01-17 PROCEDURE — 84100 ASSAY OF PHOSPHORUS: CPT

## 2021-01-17 PROCEDURE — 77010033678 HC OXYGEN DAILY

## 2021-01-17 PROCEDURE — 85025 COMPLETE CBC W/AUTO DIFF WBC: CPT

## 2021-01-17 PROCEDURE — 74011250637 HC RX REV CODE- 250/637: Performed by: NURSE PRACTITIONER

## 2021-01-17 PROCEDURE — 83735 ASSAY OF MAGNESIUM: CPT

## 2021-01-17 PROCEDURE — 74011250637 HC RX REV CODE- 250/637: Performed by: INTERNAL MEDICINE

## 2021-01-17 PROCEDURE — 74011250636 HC RX REV CODE- 250/636: Performed by: NURSE PRACTITIONER

## 2021-01-17 PROCEDURE — 36591 DRAW BLOOD OFF VENOUS DEVICE: CPT

## 2021-01-17 RX ORDER — LEVOFLOXACIN 500 MG/1
500 TABLET, FILM COATED ORAL DAILY
Qty: 7 TAB | Refills: 0 | Status: ON HOLD | OUTPATIENT
Start: 2021-01-17 | End: 2021-01-18 | Stop reason: ALTCHOICE

## 2021-01-17 RX ORDER — LEVOFLOXACIN 500 MG/1
500 TABLET, FILM COATED ORAL DAILY
Qty: 7 TAB | Refills: 0 | Status: SHIPPED | OUTPATIENT
Start: 2021-01-17 | End: 2021-01-17

## 2021-01-17 RX ADMIN — CEFEPIME HYDROCHLORIDE 2 G: 2 INJECTION, POWDER, FOR SOLUTION INTRAVENOUS at 09:36

## 2021-01-17 RX ADMIN — ACETAMINOPHEN 650 MG: 325 TABLET, FILM COATED ORAL at 02:52

## 2021-01-17 RX ADMIN — ONDANSETRON 4 MG: 2 INJECTION INTRAMUSCULAR; INTRAVENOUS at 14:17

## 2021-01-17 RX ADMIN — GABAPENTIN 300 MG: 300 CAPSULE ORAL at 09:37

## 2021-01-17 RX ADMIN — PANTOPRAZOLE SODIUM 40 MG: 40 TABLET, DELAYED RELEASE ORAL at 09:36

## 2021-01-17 RX ADMIN — ACYCLOVIR 400 MG: 200 CAPSULE ORAL at 09:37

## 2021-01-17 RX ADMIN — APIXABAN 10 MG: 5 TABLET, FILM COATED ORAL at 09:36

## 2021-01-17 RX ADMIN — CEFEPIME HYDROCHLORIDE 2 G: 2 INJECTION, POWDER, FOR SOLUTION INTRAVENOUS at 01:36

## 2021-01-17 RX ADMIN — LISINOPRIL 10 MG: 5 TABLET ORAL at 09:37

## 2021-01-17 NOTE — PROGRESS NOTES
Problem: Falls - Risk of  Goal: *Absence of Falls  Description: Document Rosalio Diane Fall Risk and appropriate interventions in the flowsheet.   Outcome: Progressing Towards Goal  Note: Fall Risk Interventions:  Mobility Interventions: Communicate number of staff needed for ambulation/transfer, Patient to call before getting OOB         Medication Interventions: Evaluate medications/consider consulting pharmacy, Teach patient to arise slowly    Elimination Interventions: Call light in reach, Patient to call for help with toileting needs, Toilet paper/wipes in reach    History of Falls Interventions: Consult care management for discharge planning, Evaluate medications/consider consulting pharmacy

## 2021-01-17 NOTE — PROGRESS NOTES
Diagnosis: MM  Transplant Date: 12/30/2020  Day: (+/-) +18. Chemo regimen used: Melphalan  BMT assessments and toxicities completed. WBC/ANC=3.2/1.5  Granix started on d+6 and last dose given on 1/10/2021  Toxicities greater than 2 :none  New orders received: none     Issues: Tmax 99.8. Pt denies nausea. Mild headache ongoing, tylenol given this morning. Pt resting quietly. No visible s/sx of distress. No needs voiced at this time.

## 2021-01-17 NOTE — PROGRESS NOTES
RN to pt room to discharge pt pt flushed and hot to touch. Temp 101.9, o2 saturation 88% on room air. 1500 tylenol given,   1505 Spoke with Krupa NP on call, CXR and blood cultures X2   One peripheral and one via port cancel discharge.

## 2021-01-18 ENCOUNTER — HOSPITAL ENCOUNTER (OUTPATIENT)
Dept: INFUSION THERAPY | Age: 61
Discharge: HOME | End: 2021-01-18
Payer: COMMERCIAL

## 2021-01-18 ENCOUNTER — APPOINTMENT (OUTPATIENT)
Dept: GENERAL RADIOLOGY | Age: 61
DRG: 371 | End: 2021-01-18
Attending: NURSE PRACTITIONER
Payer: COMMERCIAL

## 2021-01-18 ENCOUNTER — HOSPITAL ENCOUNTER (INPATIENT)
Age: 61
LOS: 3 days | Discharge: HOME | DRG: 371 | End: 2021-01-21
Attending: INTERNAL MEDICINE | Admitting: INTERNAL MEDICINE
Payer: COMMERCIAL

## 2021-01-18 VITALS
SYSTOLIC BLOOD PRESSURE: 139 MMHG | RESPIRATION RATE: 18 BRPM | DIASTOLIC BLOOD PRESSURE: 79 MMHG | BODY MASS INDEX: 26.68 KG/M2 | TEMPERATURE: 98.7 F | HEART RATE: 88 BPM | OXYGEN SATURATION: 98 % | WEIGHT: 136.6 LBS

## 2021-01-18 DIAGNOSIS — A09 DIARRHEA OF INFECTIOUS ORIGIN: ICD-10-CM

## 2021-01-18 DIAGNOSIS — C90.00 MULTIPLE MYELOMA NOT HAVING ACHIEVED REMISSION (HCC): ICD-10-CM

## 2021-01-18 DIAGNOSIS — Z94.84 STEM CELLS TRANSPLANT STATUS (HCC): ICD-10-CM

## 2021-01-18 DIAGNOSIS — C90.00 MULTIPLE MYELOMA, REMISSION STATUS UNSPECIFIED (HCC): ICD-10-CM

## 2021-01-18 DIAGNOSIS — D70.9 FEBRILE NEUTROPENIA (HCC): ICD-10-CM

## 2021-01-18 DIAGNOSIS — A04.72 C. DIFFICILE COLITIS: ICD-10-CM

## 2021-01-18 DIAGNOSIS — R50.81 FEBRILE NEUTROPENIA (HCC): ICD-10-CM

## 2021-01-18 DIAGNOSIS — C90.00 MULTIPLE MYELOMA NOT HAVING ACHIEVED REMISSION (HCC): Primary | ICD-10-CM

## 2021-01-18 DIAGNOSIS — R50.9 FEBRILE ILLNESS, ACUTE: ICD-10-CM

## 2021-01-18 LAB
ALBUMIN SERPL-MCNC: 2.3 G/DL (ref 3.2–4.6)
ALBUMIN/GLOB SERPL: 0.7 {RATIO} (ref 1.2–3.5)
ALP SERPL-CCNC: 172 U/L (ref 50–136)
ALT SERPL-CCNC: 110 U/L (ref 12–65)
ANION GAP SERPL CALC-SCNC: 9 MMOL/L (ref 7–16)
APPEARANCE UR: CLEAR
AST SERPL-CCNC: 96 U/L (ref 15–37)
BACTERIA URNS QL MICRO: 0 /HPF
BASOPHILS # BLD: 0 K/UL (ref 0–0.2)
BASOPHILS NFR BLD: 0 % (ref 0–2)
BILIRUB SERPL-MCNC: 0.3 MG/DL (ref 0.2–1.1)
BILIRUB UR QL: NEGATIVE
BUN SERPL-MCNC: 10 MG/DL (ref 8–23)
CALCIUM SERPL-MCNC: 7.7 MG/DL (ref 8.3–10.4)
CASTS URNS QL MICRO: 0 /LPF
CHLORIDE SERPL-SCNC: 108 MMOL/L (ref 98–107)
CO2 SERPL-SCNC: 23 MMOL/L (ref 21–32)
COLOR UR: YELLOW
CREAT SERPL-MCNC: 0.8 MG/DL (ref 0.6–1)
CRYSTALS URNS QL MICRO: 0 /LPF
DIFFERENTIAL METHOD BLD: ABNORMAL
EOSINOPHIL # BLD: 0 K/UL (ref 0–0.8)
EOSINOPHIL NFR BLD: 1 % (ref 0.5–7.8)
EPI CELLS #/AREA URNS HPF: NORMAL /HPF
ERYTHROCYTE [DISTWIDTH] IN BLOOD BY AUTOMATED COUNT: 14.5 % (ref 11.9–14.6)
F2 GENE MUT ANL BLD/T: NORMAL
GGT SERPL-CCNC: 249 U/L (ref 5–55)
GLOBULIN SER CALC-MCNC: 3.4 G/DL (ref 2.3–3.5)
GLUCOSE SERPL-MCNC: 133 MG/DL (ref 65–100)
GLUCOSE UR STRIP.AUTO-MCNC: NEGATIVE MG/DL
HCT VFR BLD AUTO: 28.3 % (ref 35.8–46.3)
HGB BLD-MCNC: 9.2 G/DL (ref 11.7–15.4)
HGB UR QL STRIP: ABNORMAL
IMM GRANULOCYTES # BLD AUTO: 0.1 K/UL (ref 0–0.5)
IMM GRANULOCYTES NFR BLD AUTO: 1 % (ref 0–5)
KETONES UR QL STRIP.AUTO: NEGATIVE MG/DL
LDH SERPL L TO P-CCNC: 410 U/L (ref 110–210)
LEUKOCYTE ESTERASE UR QL STRIP.AUTO: NEGATIVE
LYMPHOCYTES # BLD: 0.5 K/UL (ref 0.5–4.6)
LYMPHOCYTES NFR BLD: 10 % (ref 13–44)
MAGNESIUM SERPL-MCNC: 1.8 MG/DL (ref 1.8–2.4)
MCH RBC QN AUTO: 30.7 PG (ref 26.1–32.9)
MCHC RBC AUTO-ENTMCNC: 32.5 G/DL (ref 31.4–35)
MCV RBC AUTO: 94.3 FL (ref 79.6–97.8)
MONOCYTES # BLD: 0.6 K/UL (ref 0.1–1.3)
MONOCYTES NFR BLD: 11 % (ref 4–12)
MUCOUS THREADS URNS QL MICRO: 0 /LPF
NEUTS SEG # BLD: 3.8 K/UL (ref 1.7–8.2)
NEUTS SEG NFR BLD: 77 % (ref 43–78)
NITRITE UR QL STRIP.AUTO: NEGATIVE
NRBC # BLD: 0 K/UL (ref 0–0.2)
PH UR STRIP: 7 [PH] (ref 5–9)
PHOSPHATE SERPL-MCNC: 2.8 MG/DL (ref 2.3–3.7)
PLATELET # BLD AUTO: 103 K/UL (ref 150–450)
PMV BLD AUTO: 12.5 FL (ref 9.4–12.3)
POTASSIUM SERPL-SCNC: 2.9 MMOL/L (ref 3.5–5.1)
POTASSIUM SERPL-SCNC: 3.8 MMOL/L (ref 3.5–5.1)
PROT SERPL-MCNC: 5.7 G/DL (ref 6.3–8.2)
PROT UR STRIP-MCNC: 100 MG/DL
RBC # BLD AUTO: 3 M/UL (ref 4.05–5.25)
RBC #/AREA URNS HPF: NORMAL /HPF
SODIUM SERPL-SCNC: 140 MMOL/L (ref 136–145)
SP GR UR REFRACTOMETRY: 1.01 (ref 1–1.02)
UROBILINOGEN UR QL STRIP.AUTO: 0.2 EU/DL (ref 0.2–1)
WBC # BLD AUTO: 5 K/UL (ref 4.3–11.1)
WBC URNS QL MICRO: 0 /HPF

## 2021-01-18 PROCEDURE — 87086 URINE CULTURE/COLONY COUNT: CPT

## 2021-01-18 PROCEDURE — 83735 ASSAY OF MAGNESIUM: CPT

## 2021-01-18 PROCEDURE — 74011250636 HC RX REV CODE- 250/636: Performed by: INTERNAL MEDICINE

## 2021-01-18 PROCEDURE — 87040 BLOOD CULTURE FOR BACTERIA: CPT

## 2021-01-18 PROCEDURE — 96367 TX/PROPH/DG ADDL SEQ IV INF: CPT

## 2021-01-18 PROCEDURE — 81015 MICROSCOPIC EXAM OF URINE: CPT

## 2021-01-18 PROCEDURE — 82977 ASSAY OF GGT: CPT

## 2021-01-18 PROCEDURE — 71046 X-RAY EXAM CHEST 2 VIEWS: CPT

## 2021-01-18 PROCEDURE — 81003 URINALYSIS AUTO W/O SCOPE: CPT

## 2021-01-18 PROCEDURE — 86769 SARS-COV-2 COVID-19 ANTIBODY: CPT

## 2021-01-18 PROCEDURE — 77030018836 HC SOL IRR NACL ICUM -A

## 2021-01-18 PROCEDURE — 65270000029 HC RM PRIVATE

## 2021-01-18 PROCEDURE — 85025 COMPLETE CBC W/AUTO DIFF WBC: CPT

## 2021-01-18 PROCEDURE — 87635 SARS-COV-2 COVID-19 AMP PRB: CPT

## 2021-01-18 PROCEDURE — APPNB30 APP NON BILLABLE TIME 0-30 MINS: Performed by: NURSE PRACTITIONER

## 2021-01-18 PROCEDURE — 80053 COMPREHEN METABOLIC PANEL: CPT

## 2021-01-18 PROCEDURE — 74011250636 HC RX REV CODE- 250/636: Performed by: NURSE PRACTITIONER

## 2021-01-18 PROCEDURE — 83615 LACTATE (LD) (LDH) ENZYME: CPT

## 2021-01-18 PROCEDURE — 74011000258 HC RX REV CODE- 258: Performed by: INTERNAL MEDICINE

## 2021-01-18 PROCEDURE — 99222 1ST HOSP IP/OBS MODERATE 55: CPT | Performed by: INTERNAL MEDICINE

## 2021-01-18 PROCEDURE — 96365 THER/PROPH/DIAG IV INF INIT: CPT

## 2021-01-18 PROCEDURE — 84100 ASSAY OF PHOSPHORUS: CPT

## 2021-01-18 PROCEDURE — 74011250637 HC RX REV CODE- 250/637: Performed by: NURSE PRACTITIONER

## 2021-01-18 PROCEDURE — 96375 TX/PRO/DX INJ NEW DRUG ADDON: CPT

## 2021-01-18 PROCEDURE — 96361 HYDRATE IV INFUSION ADD-ON: CPT

## 2021-01-18 PROCEDURE — 84132 ASSAY OF SERUM POTASSIUM: CPT

## 2021-01-18 PROCEDURE — 74011000258 HC RX REV CODE- 258: Performed by: NURSE PRACTITIONER

## 2021-01-18 PROCEDURE — 2709999900 HC NON-CHARGEABLE SUPPLY

## 2021-01-18 RX ORDER — SODIUM CHLORIDE 0.9 % (FLUSH) 0.9 %
10 SYRINGE (ML) INJECTION AS NEEDED
Status: DISCONTINUED | OUTPATIENT
Start: 2021-01-18 | End: 2021-01-21 | Stop reason: HOSPADM

## 2021-01-18 RX ORDER — HEPARIN 100 UNIT/ML
300 SYRINGE INTRAVENOUS AS NEEDED
Status: DISCONTINUED | OUTPATIENT
Start: 2021-01-18 | End: 2021-01-21 | Stop reason: HOSPADM

## 2021-01-18 RX ORDER — ONDANSETRON 2 MG/ML
4 INJECTION INTRAMUSCULAR; INTRAVENOUS
Status: DISCONTINUED | OUTPATIENT
Start: 2021-01-18 | End: 2021-01-21 | Stop reason: HOSPADM

## 2021-01-18 RX ORDER — TRAMADOL HYDROCHLORIDE 50 MG/1
50 TABLET ORAL
Status: DISCONTINUED | OUTPATIENT
Start: 2021-01-18 | End: 2021-01-21 | Stop reason: HOSPADM

## 2021-01-18 RX ORDER — DIPHENHYDRAMINE HCL 25 MG
25 CAPSULE ORAL
Status: DISCONTINUED | OUTPATIENT
Start: 2021-01-18 | End: 2021-01-21 | Stop reason: HOSPADM

## 2021-01-18 RX ORDER — DIPHENOXYLATE HYDROCHLORIDE AND ATROPINE SULFATE 2.5; .025 MG/1; MG/1
1 TABLET ORAL
Status: DISCONTINUED | OUTPATIENT
Start: 2021-01-18 | End: 2021-01-19

## 2021-01-18 RX ORDER — POTASSIUM CHLORIDE 29.8 MG/ML
40 INJECTION INTRAVENOUS ONCE
Status: COMPLETED | OUTPATIENT
Start: 2021-01-18 | End: 2021-01-18

## 2021-01-18 RX ORDER — SODIUM CHLORIDE 0.9 % (FLUSH) 0.9 %
10-40 SYRINGE (ML) INJECTION EVERY 8 HOURS
Status: DISCONTINUED | OUTPATIENT
Start: 2021-01-18 | End: 2021-01-20 | Stop reason: HOSPADM

## 2021-01-18 RX ORDER — MORPHINE SULFATE 2 MG/ML
2 INJECTION, SOLUTION INTRAMUSCULAR; INTRAVENOUS
Status: DISCONTINUED | OUTPATIENT
Start: 2021-01-18 | End: 2021-01-21 | Stop reason: HOSPADM

## 2021-01-18 RX ORDER — HYDROCODONE BITARTRATE AND ACETAMINOPHEN 5; 325 MG/1; MG/1
1 TABLET ORAL
Status: DISCONTINUED | OUTPATIENT
Start: 2021-01-18 | End: 2021-01-21 | Stop reason: HOSPADM

## 2021-01-18 RX ORDER — DEXTROSE, SODIUM CHLORIDE, AND POTASSIUM CHLORIDE 5; .45; .15 G/100ML; G/100ML; G/100ML
1000 INJECTION INTRAVENOUS
Status: COMPLETED | OUTPATIENT
Start: 2021-01-18 | End: 2021-01-18

## 2021-01-18 RX ORDER — GABAPENTIN 300 MG/1
300 CAPSULE ORAL 3 TIMES DAILY
Status: DISCONTINUED | OUTPATIENT
Start: 2021-01-18 | End: 2021-01-19

## 2021-01-18 RX ORDER — SODIUM CHLORIDE 9 MG/ML
100 INJECTION, SOLUTION INTRAVENOUS CONTINUOUS
Status: DISCONTINUED | OUTPATIENT
Start: 2021-01-18 | End: 2021-01-20

## 2021-01-18 RX ORDER — MORPHINE SULFATE 2 MG/ML
1 INJECTION, SOLUTION INTRAMUSCULAR; INTRAVENOUS ONCE
Status: COMPLETED | OUTPATIENT
Start: 2021-01-18 | End: 2021-01-18

## 2021-01-18 RX ORDER — LOPERAMIDE HYDROCHLORIDE 2 MG/1
2 CAPSULE ORAL AS NEEDED
Status: DISCONTINUED | OUTPATIENT
Start: 2021-01-18 | End: 2021-01-19

## 2021-01-18 RX ORDER — PANTOPRAZOLE SODIUM 40 MG/1
40 TABLET, DELAYED RELEASE ORAL
Status: DISCONTINUED | OUTPATIENT
Start: 2021-01-19 | End: 2021-01-21 | Stop reason: HOSPADM

## 2021-01-18 RX ORDER — ACYCLOVIR 800 MG/1
400 TABLET ORAL 2 TIMES DAILY
Status: DISCONTINUED | OUTPATIENT
Start: 2021-01-18 | End: 2021-01-21 | Stop reason: HOSPADM

## 2021-01-18 RX ORDER — LORAZEPAM 1 MG/1
1 TABLET ORAL
Status: DISCONTINUED | OUTPATIENT
Start: 2021-01-18 | End: 2021-01-21 | Stop reason: HOSPADM

## 2021-01-18 RX ORDER — LISINOPRIL 5 MG/1
10 TABLET ORAL DAILY
Status: DISCONTINUED | OUTPATIENT
Start: 2021-01-19 | End: 2021-01-21 | Stop reason: HOSPADM

## 2021-01-18 RX ADMIN — DEXTROSE, SODIUM CHLORIDE, AND POTASSIUM CHLORIDE 1000 ML: 5; .45; .15 INJECTION INTRAVENOUS at 08:30

## 2021-01-18 RX ADMIN — VANCOMYCIN HYDROCHLORIDE 1000 MG: 1 INJECTION, POWDER, LYOPHILIZED, FOR SOLUTION INTRAVENOUS at 10:25

## 2021-01-18 RX ADMIN — SODIUM CHLORIDE 100 ML/HR: 900 INJECTION, SOLUTION INTRAVENOUS at 14:06

## 2021-01-18 RX ADMIN — Medication 20 ML: at 12:31

## 2021-01-18 RX ADMIN — TRAMADOL HYDROCHLORIDE 50 MG: 50 TABLET ORAL at 17:16

## 2021-01-18 RX ADMIN — CEFEPIME HYDROCHLORIDE 2 G: 2 INJECTION, POWDER, FOR SOLUTION INTRAVENOUS at 09:50

## 2021-01-18 RX ADMIN — HYDROCODONE BITARTRATE AND ACETAMINOPHEN 1 TABLET: 5; 325 TABLET ORAL at 14:06

## 2021-01-18 RX ADMIN — MORPHINE SULFATE 1 MG: 2 INJECTION, SOLUTION INTRAMUSCULAR; INTRAVENOUS at 10:06

## 2021-01-18 RX ADMIN — ACYCLOVIR 400 MG: 800 TABLET ORAL at 17:16

## 2021-01-18 RX ADMIN — HYDROCODONE BITARTRATE AND ACETAMINOPHEN 1 TABLET: 5; 325 TABLET ORAL at 20:59

## 2021-01-18 RX ADMIN — GABAPENTIN 300 MG: 300 CAPSULE ORAL at 21:01

## 2021-01-18 RX ADMIN — VANCOMYCIN HYDROCHLORIDE 750 MG: 750 INJECTION, POWDER, LYOPHILIZED, FOR SOLUTION INTRAVENOUS at 20:59

## 2021-01-18 RX ADMIN — CEFEPIME HYDROCHLORIDE 2 G: 2 INJECTION, POWDER, FOR SOLUTION INTRAVENOUS at 20:09

## 2021-01-18 RX ADMIN — GABAPENTIN 300 MG: 300 CAPSULE ORAL at 17:16

## 2021-01-18 RX ADMIN — POTASSIUM CHLORIDE 40 MEQ: 29.8 INJECTION, SOLUTION INTRAVENOUS at 14:05

## 2021-01-18 RX ADMIN — APIXABAN 10 MG: 5 TABLET, FILM COATED ORAL at 17:16

## 2021-01-18 RX ADMIN — Medication 10 ML: at 21:02

## 2021-01-18 NOTE — PROGRESS NOTES
Care Management Interventions  PCP Verified by CM: Yes  Palliative Care Criteria Met (RRAT>21 & CHF Dx)?: No  Transition of Care Consult (CM Consult): Discharge Planning  Discharge Durable Medical Equipment: No  Physical Therapy Consult: No  Occupational Therapy Consult: No  Speech Therapy Consult: No  Current Support Network: Lives with Spouse(Roshan Infante [phone])  Confirm Follow Up Transport: Family  Discharge Location  Discharge Placement: Unable to determine at this time  Patient admitted for neutropenic fever. She was at the MD office today reported Shortness of breath, fatigue and fever. Patient transferred to [facility] for further care. Patient was just d/c 1/17 to home with spouse. CM familiar with patient from previous admission. She lives with her spouse Jana High and independent of ADL's. She requires no DME and has needed transportation. . She has [facility] [employer] and obtains medications. D/C plan pending clinical progress.

## 2021-01-18 NOTE — PROGRESS NOTES
Pharmacokinetic Consult to Pharmacist    Freedom Roberson is a 56 y.o. female being treated for sespis with vancomycin and cefepime. Weight: 61.9 kg (136 lb 7.4 oz)  Lab Results   Component Value Date/Time    BUN 10 01/18/2021 08:35 AM    Creatinine 0.80 01/18/2021 08:35 AM    WBC 5.0 01/18/2021 08:35 AM      Estimated Creatinine Clearance: 60 mL/min (based on SCr of 0.8 mg/dL). Lab Results   Component Value Date/Time    Vancomycin,trough 16.4 01/12/2021 04:27 AM       Day 1 of vancomycin. Goal trough is 15-20. Dosing started with 1g x 1 and then 750 mg q8h. Will continue to follow patient.       Thank you,  Tk Cavanaugh, PharmD, [address] [place] [address]  Clinical Pharmacist  [phone]

## 2021-01-18 NOTE — PROGRESS NOTES
Pt here ambulatory. Mozobil given SQ. Pt observed for 30min then discharged to home ambulatory with his wife. Pt will return tomorrow 1/19 at 0715

## 2021-01-18 NOTE — PROGRESS NOTES
Diagnosis: MM  Transplant Date: 12/30/2020  Day: (+/-) +19. Chemo regimen used: Melphalan  BMT assessments and toxicities completed. WBC/ANC=5.0/3.8  Granix started on d+6 and last dose given on 1/10/2021  Toxicities greater than 2 :none  Pt seen today by, Dr Valera orders received: Rapid COVID test, blood culturesx2, UA, maxipime , vancomycin and morphine 1mg.     Issues:  Pt is here via SAMANTHA Linda with her . Pt's  reports max temp at home 101. Temp now 99. Pt C/O being SOB diarrhea and back pain. Today pt recived 1lt d5.45 [id number], rapid covid and covid test, maxipime, vancomycin and 1mg morphine for C/O back pain. Blood cultures x2,  UA and daily labs done. Pt discharged to [address]  via SAMANTHA Linda with her .

## 2021-01-19 ENCOUNTER — APPOINTMENT (OUTPATIENT)
Dept: CT IMAGING | Age: 61
DRG: 371 | End: 2021-01-19
Attending: NURSE PRACTITIONER
Payer: COMMERCIAL

## 2021-01-19 LAB
ALBUMIN SERPL-MCNC: 2 G/DL (ref 3.2–4.6)
ALBUMIN/GLOB SERPL: 0.7 {RATIO} (ref 1.2–3.5)
ALP SERPL-CCNC: 151 U/L (ref 50–136)
ALT SERPL-CCNC: 92 U/L (ref 12–65)
ANION GAP SERPL CALC-SCNC: 6 MMOL/L (ref 7–16)
AST SERPL-CCNC: 66 U/L (ref 15–37)
BASOPHILS # BLD: 0 K/UL (ref 0–0.2)
BASOPHILS NFR BLD: 1 % (ref 0–2)
BILIRUB SERPL-MCNC: 0.2 MG/DL (ref 0.2–1.1)
BUN SERPL-MCNC: 8 MG/DL (ref 8–23)
C DIFF GDH STL QL: ABNORMAL
C DIFF TOX A+B STL QL IA: ABNORMAL
CALCIUM SERPL-MCNC: 7.2 MG/DL (ref 8.3–10.4)
CHLORIDE SERPL-SCNC: 115 MMOL/L (ref 98–107)
CLINICAL CONSIDERATION: ABNORMAL
CO2 SERPL-SCNC: 23 MMOL/L (ref 21–32)
COVID-19 RAPID TEST, COVR: NOT DETECTED
CREAT SERPL-MCNC: 0.6 MG/DL (ref 0.6–1)
DIASORIN SARS-COV-2 AB, IGG, RCVG3T: NEGATIVE
DIFFERENTIAL METHOD BLD: ABNORMAL
EOSINOPHIL # BLD: 0.1 K/UL (ref 0–0.8)
EOSINOPHIL NFR BLD: 2 % (ref 0.5–7.8)
ERYTHROCYTE [DISTWIDTH] IN BLOOD BY AUTOMATED COUNT: 14.5 % (ref 11.9–14.6)
GLOBULIN SER CALC-MCNC: 2.8 G/DL (ref 2.3–3.5)
GLUCOSE SERPL-MCNC: 89 MG/DL (ref 65–100)
HCT VFR BLD AUTO: 23.1 % (ref 35.8–46.3)
HGB BLD-MCNC: 7.6 G/DL (ref 11.7–15.4)
IMM GRANULOCYTES # BLD AUTO: 0 K/UL (ref 0–0.5)
IMM GRANULOCYTES NFR BLD AUTO: 1 % (ref 0–5)
INTERPRETATION: ABNORMAL
LYMPHOCYTES # BLD: 1 K/UL (ref 0.5–4.6)
LYMPHOCYTES NFR BLD: 30 % (ref 13–44)
MAGNESIUM SERPL-MCNC: 1.8 MG/DL (ref 1.8–2.4)
MCH RBC QN AUTO: 31.3 PG (ref 26.1–32.9)
MCHC RBC AUTO-ENTMCNC: 32.9 G/DL (ref 31.4–35)
MCV RBC AUTO: 95.1 FL (ref 79.6–97.8)
MONOCYTES # BLD: 0.5 K/UL (ref 0.1–1.3)
MONOCYTES NFR BLD: 17 % (ref 4–12)
NEUTS SEG # BLD: 1.6 K/UL (ref 1.7–8.2)
NEUTS SEG NFR BLD: 49 % (ref 43–78)
NRBC # BLD: 0 K/UL (ref 0–0.2)
PCR REFLEX: ABNORMAL
PLATELET # BLD AUTO: 95 K/UL (ref 150–450)
PLATELET COMMENTS,PCOM: ABNORMAL
PMV BLD AUTO: 11.2 FL (ref 9.4–12.3)
POTASSIUM SERPL-SCNC: 3.4 MMOL/L (ref 3.5–5.1)
POTASSIUM SERPL-SCNC: 4 MMOL/L (ref 3.5–5.1)
PROT SERPL-MCNC: 4.8 G/DL (ref 6.3–8.2)
RBC # BLD AUTO: 2.43 M/UL (ref 4.05–5.2)
RBC MORPH BLD: ABNORMAL
SARS COV-2, XPGCVT: NEGATIVE
SODIUM SERPL-SCNC: 144 MMOL/L (ref 136–145)
SOURCE, COVRS: NORMAL
WBC # BLD AUTO: 3.2 K/UL (ref 4.3–11.1)
WBC MORPH BLD: ABNORMAL

## 2021-01-19 PROCEDURE — 71260 CT THORAX DX C+: CPT

## 2021-01-19 PROCEDURE — 74011250637 HC RX REV CODE- 250/637: Performed by: NURSE PRACTITIONER

## 2021-01-19 PROCEDURE — 99233 SBSQ HOSP IP/OBS HIGH 50: CPT | Performed by: INTERNAL MEDICINE

## 2021-01-19 PROCEDURE — 74011000636 HC RX REV CODE- 636: Performed by: INTERNAL MEDICINE

## 2021-01-19 PROCEDURE — 74011250636 HC RX REV CODE- 250/636: Performed by: NURSE PRACTITIONER

## 2021-01-19 PROCEDURE — 65270000029 HC RM PRIVATE

## 2021-01-19 PROCEDURE — 83735 ASSAY OF MAGNESIUM: CPT

## 2021-01-19 PROCEDURE — 80053 COMPREHEN METABOLIC PANEL: CPT

## 2021-01-19 PROCEDURE — 87324 CLOSTRIDIUM AG IA: CPT

## 2021-01-19 PROCEDURE — 74011000250 HC RX REV CODE- 250: Performed by: NURSE PRACTITIONER

## 2021-01-19 PROCEDURE — 85025 COMPLETE CBC W/AUTO DIFF WBC: CPT

## 2021-01-19 PROCEDURE — 87305 ASPERGILLUS AG IA: CPT

## 2021-01-19 PROCEDURE — APPSS60 APP SPLIT SHARED TIME 46-60 MINUTES: Performed by: NURSE PRACTITIONER

## 2021-01-19 PROCEDURE — 36591 DRAW BLOOD OFF VENOUS DEVICE: CPT

## 2021-01-19 PROCEDURE — 93306 TTE W/DOPPLER COMPLETE: CPT

## 2021-01-19 PROCEDURE — 74011000258 HC RX REV CODE- 258: Performed by: NURSE PRACTITIONER

## 2021-01-19 PROCEDURE — 74011250636 HC RX REV CODE- 250/636: Performed by: INTERNAL MEDICINE

## 2021-01-19 PROCEDURE — 74011000258 HC RX REV CODE- 258: Performed by: INTERNAL MEDICINE

## 2021-01-19 PROCEDURE — 84132 ASSAY OF SERUM POTASSIUM: CPT

## 2021-01-19 PROCEDURE — 87449 NOS EACH ORGANISM AG IA: CPT

## 2021-01-19 PROCEDURE — 2709999900 HC NON-CHARGEABLE SUPPLY

## 2021-01-19 RX ORDER — POTASSIUM CHLORIDE 29.8 MG/ML
40 INJECTION INTRAVENOUS ONCE
Status: COMPLETED | OUTPATIENT
Start: 2021-01-19 | End: 2021-01-19

## 2021-01-19 RX ORDER — SODIUM CHLORIDE 0.9 % (FLUSH) 0.9 %
10 SYRINGE (ML) INJECTION
Status: COMPLETED | OUTPATIENT
Start: 2021-01-19 | End: 2021-01-19

## 2021-01-19 RX ADMIN — SODIUM CHLORIDE 100 ML: 900 INJECTION, SOLUTION INTRAVENOUS at 13:10

## 2021-01-19 RX ADMIN — VANCOMYCIN HYDROCHLORIDE 125 MG: 5 INJECTION, POWDER, LYOPHILIZED, FOR SOLUTION INTRAVENOUS at 14:36

## 2021-01-19 RX ADMIN — ACYCLOVIR 400 MG: 800 TABLET ORAL at 18:07

## 2021-01-19 RX ADMIN — DIATRIZOATE MEGLUMINE AND DIATRIZOATE SODIUM 15 ML: 660; 100 LIQUID ORAL; RECTAL at 11:35

## 2021-01-19 RX ADMIN — VANCOMYCIN HYDROCHLORIDE 125 MG: 5 INJECTION, POWDER, LYOPHILIZED, FOR SOLUTION INTRAVENOUS at 19:29

## 2021-01-19 RX ADMIN — PANTOPRAZOLE SODIUM 40 MG: 40 TABLET, DELAYED RELEASE ORAL at 09:40

## 2021-01-19 RX ADMIN — VANCOMYCIN HYDROCHLORIDE 750 MG: 750 INJECTION, POWDER, LYOPHILIZED, FOR SOLUTION INTRAVENOUS at 04:59

## 2021-01-19 RX ADMIN — APIXABAN 10 MG: 5 TABLET, FILM COATED ORAL at 09:40

## 2021-01-19 RX ADMIN — DIPHENOXYLATE HYDROCHLORIDE AND ATROPINE SULFATE 1 TABLET: 2.5; .025 TABLET ORAL at 09:01

## 2021-01-19 RX ADMIN — CEFEPIME HYDROCHLORIDE 2 G: 2 INJECTION, POWDER, FOR SOLUTION INTRAVENOUS at 14:35

## 2021-01-19 RX ADMIN — TRAMADOL HYDROCHLORIDE 50 MG: 50 TABLET ORAL at 09:01

## 2021-01-19 RX ADMIN — SODIUM CHLORIDE 100 ML/HR: 900 INJECTION, SOLUTION INTRAVENOUS at 04:59

## 2021-01-19 RX ADMIN — Medication 10 ML: at 13:11

## 2021-01-19 RX ADMIN — CEFEPIME HYDROCHLORIDE 2 G: 2 INJECTION, POWDER, FOR SOLUTION INTRAVENOUS at 04:10

## 2021-01-19 RX ADMIN — LISINOPRIL 10 MG: 5 TABLET ORAL at 09:40

## 2021-01-19 RX ADMIN — POTASSIUM CHLORIDE 40 MEQ: 400 INJECTION, SOLUTION INTRAVENOUS at 04:10

## 2021-01-19 RX ADMIN — ONDANSETRON 4 MG: 2 INJECTION INTRAMUSCULAR; INTRAVENOUS at 23:21

## 2021-01-19 RX ADMIN — IOPAMIDOL 100 ML: 755 INJECTION, SOLUTION INTRAVENOUS at 13:10

## 2021-01-19 RX ADMIN — APIXABAN 10 MG: 5 TABLET, FILM COATED ORAL at 18:07

## 2021-01-19 RX ADMIN — ACYCLOVIR 400 MG: 800 TABLET ORAL at 09:40

## 2021-01-19 RX ADMIN — VANCOMYCIN HYDROCHLORIDE 750 MG: 750 INJECTION, POWDER, LYOPHILIZED, FOR SOLUTION INTRAVENOUS at 16:09

## 2021-01-19 NOTE — PROGRESS NOTES
Diagnosis: Multiple Myeloma  Transplant Date: 12/30/20  Day: +20  Chemo regimen used: HD Melphalan  Labs not resulted that need follow-up: blood & urine culture,stool c diff,CMV,fungitell,aspergillus  BMT assessments and toxicities completed. 1/18/21  WBC/ANC= 3.2/1.6  Prophylactic medications started po Acyclovir. On IV Vanco & Cefepime for neutropenic fever  Granix started on D+6 ;completed  Toxicities greater than 2 : none  Patient seen by MD/NP daily & prn while inpatient   New orders received: 40 meqs IV KCL for K+ 3.4;rpt level ordered for 0900  Issues: no new issues    END OF SHIFT NOTE:    Intake/Output  01/18 1901 - 01/19 0700  In: 817 [P.O.:240; I.V.:577]  Out: 1100 [Urine:1100]   Voiding: YES  Catheter: NO  Drain:              Stool:  2 occurrences    Stool Assessment  Stool Color: Brown(per pt) (01/19/21 0052)  Stool Appearance: Loose(per pt;not assessed;had incontinence & cleaned herself up) (01/19/21 0052)  Stool Amount: Small(per pt) (01/19/21 0052)  Stool Source/Status: Incontinence; Rectum (01/19/21 0052)    Emesis:  0 occurrences. VITAL SIGNS  Patient Vitals for the past 12 hrs:   Temp Pulse Resp BP SpO2   01/18/21 2223 98.5 °F (36.9 °C) 99 16 125/61 94 %   01/18/21 2015 99.1 °F (37.3 °C) 99 18 132/73 95 %       Pain Assessment  Pain 1  Pain Scale 1: Numeric (0 - 10) (01/19/21 0130)  Pain Intensity 1: 0 (01/19/21 0130)  Patient Stated Pain Goal: 0 (01/19/21 0130)  Pain Reassessment 1: Yes (01/18/21 2145)  Pain Onset 1: pta (01/18/21 2059)  Pain Location 1: Generalized (01/18/21 2059)  Pain Orientation 1: Other (comment)(generalized) (01/18/21 2059)  Pain Description 1: Aching (01/18/21 2059)  Pain Intervention(s) 1: Medication (see MAR); Rest (01/18/21 2059)    Ambulating  Yes    Additional Information:   Afebrile during the shift. Mouth care instructed;compliant. x2 loose BM;small amounts; sent stool for c diff. Redness/flushed face improved.     Shift report given to oncoming nurse OVIDIO Summers at the bedside.     Maria Luisa Skelton RN

## 2021-01-19 NOTE — PROGRESS NOTES
Problem: Falls - Risk of  Goal: *Absence of Falls  Description: Document Leafy Santiago Fall Risk and appropriate interventions in the flowsheet. Outcome: Progressing Towards Goal  Note: Fall Risk Interventions:  Mobility Interventions: Strengthening exercises (ROM-active/passive)         Medication Interventions: Teach patient to arise slowly    Elimination Interventions: Toileting schedule/hourly rounds              Problem: Risk for Spread of Infection  Goal: Prevent transmission of infectious organism to others  Description: Prevent the transmission of infectious organisms to other patients, staff members, and visitors. Outcome: Progressing Towards Goal     Problem: Diarrhea (Adult and Pediatrics)  Goal: *Absence of diarrhea  Outcome: Progressing Towards Goal  Goal: *PALLIATIVE CARE:  Absence of diarrhea  Outcome: Progressing Towards Goal     Problem: Patient Education: Go to Patient Education Activity  Goal: Patient/Family Education  Outcome: Progressing Towards Goal     Problem: Neutropenic Fever: Day 1  Goal: Off Pathway (Use only if patient is Off Pathway)  Outcome: Progressing Towards Goal  Goal: Activity/Safety  Outcome: Progressing Towards Goal  Goal: Consults, if ordered  Outcome: Progressing Towards Goal  Goal: Diagnostic Test/Procedures  Outcome: Progressing Towards Goal  Goal: Nutrition/Diet  Outcome: Progressing Towards Goal  Goal: Discharge Planning  Outcome: Progressing Towards Goal  Goal: Medications  Outcome: Progressing Towards Goal  Goal: Respiratory  Outcome: Progressing Towards Goal  Goal: Treatments/Interventions/Procedures  Outcome: Progressing Towards Goal  Goal: Psychosocial  Outcome: Progressing Towards Goal  Goal: *Optimal pain control at patient's stated goal  Outcome: Progressing Towards Goal  Goal: *Hemodynamically stable  Outcome: Progressing Towards Goal  Goal: *Adequate oxygenation  Outcome: Progressing Towards Goal     Problem:  Body Temperature -  Risk of, Imbalanced  Goal: *Absence of heat stress or hyperthermia signs and symptoms  Outcome: Progressing Towards Goal     Problem: Patient Education: Go to Patient Education Activity  Goal: Patient/Family Education  Outcome: Progressing Towards Goal     Problem: Pain  Goal: *Control of Pain  Outcome: Progressing Towards Goal     Problem: Patient Education: Go to Patient Education Activity  Goal: Patient/Family Education  Outcome: Progressing Towards Goal     Problem: Nausea/Vomiting (Adult)  Goal: *Absence of nausea/vomiting  Outcome: Progressing Towards Goal     Problem: Patient Education: Go to Patient Education Activity  Goal: Patient/Family Education  Outcome: Progressing Towards Goal

## 2021-01-19 NOTE — PROGRESS NOTES
Pt requesting education/hand-outs on Cdiff infection. Handouts printed from [employer]. Provided education about Cdiff as well as what risks that attributed to infection (recent hospital stay, transplant/immunocompromised status, recent antibx use). All questions answered to the best of my ability.       Naif Chen NP  [facility] Hematology & Oncology

## 2021-01-19 NOTE — CONSULTS
Infectious Disease Consult    Today's Date: 1/19/2021   Admit Date: 1/18/2021    Impression:   · Multiple myeloma s/p peripheral blood HCT on 12/30/20; engrafted 1/11/2021  · + C diff colitis  · DVT  · transaminitis 2x normal    I do not think that she has pyelonephritis    Plan:   ·  stop cefepime and IV vancomycin   · Continue po vancomycin  · I don't think that she needs acyclovir at this point post engraftment, but will defer to oncology team    Anti-infectives:   · Acyclovir  · Vancomycin  · cefepime    Subjective:   Date of Consultation:  January 19, 2021  Referring Physician: Emily Blevins    Patient is a 56 y.o. female who was diagnosed with multiple myeloma on 6/2020. She was initially treated with 4 cycles of revlimid, velcade, dexamethasone and had a partial response. She underwent autologous stem cell transplant on 12/30/2020. She was admitted yesterday with febrile neutropenia. She was started on vancomycin, cefepime, and acyclovir empirically (she was already on acyclovir prophylaxis). Absolute neutrophil count is currently 1600. She has been afebrile since admission yesterday. She was only discharged for a day before returning. She had fever and diarrhea that was present prior to discharge and the diarrhea has returned after she came back to the hospital. She has had 4-5 small volume stools just this morning.       Patient Active Problem List   Diagnosis Code    Essential hypertension I10    Hyperlipidemia E78.5    Left hip pain M25.552    GERD (gastroesophageal reflux disease) K21.9    Multiple myeloma not having achieved remission ([facility]) C90.00    Neutropenic fever [facility] D70.9, R50.81    Febrile neutropenia ([facility] D70.9, R50.81    History of autologous stem cell transplant ([facility] Z94.84     Past Medical History:   Diagnosis Date    DVT (deep venous thrombosis) [facility]     Essential hypertension 3/29/2018    GERD (gastroesophageal reflux disease) 3/29/2018    Hyperlipemia     Hyperlipidemia 3/29/2018    Hypertension     Left hip pain 3/29/2018    Multiple myeloma ([address] 06/2020      Family History   Problem Relation Age of Onset    Breast Cancer Neg Hx     Colon Cancer Neg Hx     Ovarian Cancer Neg Hx       Social History     Tobacco Use    Smoking status: Never Smoker    Smokeless tobacco: Never Used   Substance Use Topics    Alcohol use: No     Past Surgical History:   Procedure Laterality Date    HX OTHER SURGICAL      open chest surgey    HX TRANSPLANT      Autologous Stem Cell treansplant 12/30/2020    HX TUBAL LIGATION      IR INSERT NON TUNL CVC OVER 5 YRS  12/7/2020    IR INSERT TUNL CVC W PORT OVER 5 YEARS  11/17/2020      Prior to Admission medications    Medication Sig Start Date End Date Taking? Authorizing Provider   apixaban (Eliquis DVT-PE Treat 30D Start) 5 mg (74 tabs) starter pack Take 10 mg (two 5 mg tablets) by mouth twice a day for 7 days Followed by 5 mg (one 5 mg tablet) by mouth twice a day 1/15/21   Ryan Weeks NP   LORazepam (ATIVAN) 1 mg tablet Take 1 Tab by mouth every four (4) hours as needed (nausea). Max Daily Amount: 6 mg. Indications: prevent nausea and vomiting from cancer chemotherapy 1/2/21   Gino Beckham MD   ondansetron hcl [facility]) 8 mg tablet Take 1 Tab by mouth every eight (8) hours as needed for Nausea or Vomiting. Indications: nausea and vomiting caused by cancer drugs 1/2/21   Gino Beckham MD   prochlorperazine (COMPAZINE) 10 mg tablet Take 1 Tab by mouth every six (6) hours as needed for Nausea. Indications: nausea and vomiting caused by cancer drugs 1/2/21   Gino Beckham MD   acyclovir (ZOVIRAX) 400 mg tablet Take 1 Tab by mouth two (2) times a day. 12/28/20   Holli Garcia MD   ergocalciferol (ERGOCALCIFEROL) 1,250 mcg (50,000 unit) capsule Take 1 Cap by mouth every seven (7) days.  10/14/20   Holli Garcia MD   gabapentin (NEURONTIN) 300 mg capsule TAKE 1 CAPSULE BY MOUTH THREE TIMES DAILY 8/18/20   Provider, Historical traMADoL (ULTRAM) 50 mg tablet TAKE 1 TABLET BY MOUTH TWICE DAILY AS NEEDED FOR PAIN 20   Provider, Historical   simvastatin (ZOCOR) 20 mg tablet Take 1 Tab by mouth nightly. Indications: high cholesterol and high triglycerides 20   Fleta Amabile F, NP   lisinopriL (PRINIVIL, ZESTRIL) 10 mg tablet Take 1 Tab by mouth daily. Indications: high blood pressure 20   Fleta Amabile F NP   fluticasone propionate (FLONASE) 50 mcg/actuation nasal spray 2 Sprays by Both Nostrils route daily. 19   Brennan Tesfaye MD   esomeprazole (NEXIUM) 20 mg capsule Take 1 Cap by mouth daily. 19   Brennan Tesfaye MD       Allergies   Allergen Reactions    Erythromycin Nausea Only    Penicillins Rash        Review of Systems:  A comprehensive review of systems was negative except for that written in the History of Present Illness. Objective:     Visit Vitals  /77 (BP 1 Location: Right arm)   Pulse 83   Temp 98.6 °F (37 °C)   Resp 19   Wt 62.6 kg (138 lb)   SpO2 95%   Breastfeeding No   BMI 26.95 kg/m²     Temp (24hrs), Av.8 °F (37.1 °C), Min:98.5 °F (36.9 °C), Max:99.1 °F (37.3 °C)       Lines:  Peripheral IV:       Physical Exam:    General:  Alert, cooperative, well nourished, well developed, appears stated age   Eyes:  Sclera anicteric. Pupils equally round and reactive to light. Mouth/Throat: Mucous membranes normal, oral pharynx clear   Neck: Supple   Lungs:   Clear to auscultation bilaterally, good effort   CV:  Regular rate and rhythm,no murmur, click, rub or gallop   Abdomen:   Soft, non-tender.  bowel sounds normal. non-distended   Extremities: No cyanosis or edema   Skin: Skin color, texture, turgor normal. no acute rash or lesions   Lymph nodes: Cervical and supraclavicular normal   Musculoskeletal: No swelling or deformity   Lines/Devices:  Intact, no erythema, drainage or tenderness   Psych: Alert and oriented, normal mood affect given the setting       Data Review: CBC:  Recent Labs     01/19/21 0248 01/18/21 0835 01/17/21  0256   WBC 3.2* 5.0 3.2*   GRANS 49 77 48   MONOS 17* 11 21*   EOS 2 1 0*   ANEU 1.6* 3.8 1.5*   ABL 1.0 0.5 0.8   HGB 7.6* 9.2* 9.1*   HCT 23.1* 28.3* 27.5*   PLT 95* 103* 75*       BMP:  Recent Labs     01/19/21 0248 01/18/21 2058 01/18/21 0835 01/17/21 0256   CREA 0.60  --  0.80 0.50*   BUN 8  --  10 10     --  140 139   K 3.4* 3.8 2.9* 3.5   *  --  108* 107   CO2 23  --  23 27   AGAP 6*  --  9 5*   GLU 89  --  133* 97       LFTS:  Recent Labs     01/19/21 0248 01/18/21 0835 01/17/21 0256   TBILI 0.2 0.3 0.2   ALT 92* 110* 109*   * 172* 178*   TP 4.8* 5.7* 5.6*   ALB 2.0* 2.3* 2.3*       Microbiology:   CMV (1/11/2021) negative    All Micro Results     Procedure Component Value Units Date/Time    C. DIFFICILE AG & TOXIN A/B [[id number]] Collected: 01/19/21 0507    Order Status: Completed Updated: 01/19/21 0604    CLOSTRIDIUM DIFF TOXIN A & B [[id number]] Collected: 01/19/21 0507    Order Status: Canceled Specimen: Stool     CMV BY PCR, QT [[id number]] Collected: 01/19/21 0248    Order Status: Completed Specimen: Blood Updated: 01/19/21 0310    CMV BY PCR, QT [[id number]]     Order Status: Canceled Specimen: Blood           Imaging:   CT chest/abd/pelvis  IMPRESSION  Chest:  1. Trace pleural pleural effusions with bibasilar dependent and right middle  lobe linear opacities favored to represent atelectasis.     Abdomen and pelvis:  1. Multiple wedge-shaped areas of hypoenhancement involving both kidneys. In the  setting of fever this is favored to represent multifocal pyelonephritis. No  perinephric abscess. 2. Cholelithiasis without acute cholecystitis. 3. Nonspecific hypodense splenic lesion remains unchanged. 4. Unchanged lytic lesion in the left posterior aspect of the T12 vertebral  body, possibly related to adjacent costovertebral degenerative changes, though  indeterminate.     CXR (1/18/2021)  IMPRESSION:  IMPRESSION: Near total resolution of the right basilar infiltrate. Duplex venous ultrasound (1/11/2021)  IMPRESSION:  New partially occlusive thrombus in the right popliteal vein and right peroneal veins.     Signed By: Alverto Alicea MD     January 19, 2021

## 2021-01-19 NOTE — H&P
Inpatient Hematology / Oncology History and Physical    Reason for Asmission:  Febrile neutropenia (HCC) [D70.9, R50.81];History of autologous stem cell transplant (HCC) [Z94.84]    History of Present Illness:  Ms. Arrieta is a 60 y.o. female admitted on 1/18/2021 with a primary diagnosis of The encounter diagnosis was Febrile neutropenia (HCC)..      She has Multiple Myeloma, s/p Auto PBHCT Day +19 with febrile illness      Review of Systems:  Constitutional C/o fever.   HEENT Denies trauma, blurry vision, hearing loss, ear pain, nosebleeds, sore throat, neck pain and ear discharge.    Skin Denies lesions or rashes.   Lungs Denies dyspnea, cough, sputum production or hemoptysis.   Cardiovascular Denies chest pain, palpitations, or lower extremity edema.   Gastrointestinal Denies nausea, vomiting, changes in bowel habits, bloody or black stools, abdominal pain.    Denies dysuria, frequency or hesitancy of urination.   Neuro Denies headaches, visual changes or ataxia. Denies dizziness, tingling, tremors, sensory change, speech change, focal weakness or headaches.     Hematology Denies easy bruising or bleeding, denies gingival bleeding or epistaxis.   Endo Denies heat/cold intolerance, denies diabetes or thyroid abnormalities.   MSK Denies back pain, arthralgias, myalgias or frequent falls.     Psychiatric/Behavioral Denies depression and substance abuse. The patient is not nervous/anxious.         Allergies   Allergen Reactions   • Erythromycin Nausea Only   • Penicillins Rash     Past Medical History:   Diagnosis Date   • DVT (deep venous thrombosis) ([facility])    • Essential hypertension 3/29/2018   • GERD (gastroesophageal reflux disease) 3/29/2018   • Hyperlipemia    • Hyperlipidemia 3/29/2018   • Hypertension    • Left hip pain 3/29/2018   • Multiple myeloma (HCC) 06/2020     Past Surgical History:   Procedure Laterality Date   • HX OTHER SURGICAL      open chest surgey   • HX TRANSPLANT      Autologous  Stem Cell treansplant 12/30/2020    HX TUBAL LIGATION      IR INSERT NON TUNL CVC OVER 5 YRS  12/7/2020    IR INSERT TUNL CVC W PORT OVER 5 YEARS  11/17/2020     Family History   Problem Relation Age of Onset    Breast Cancer Neg Hx     Colon Cancer Neg Hx     Ovarian Cancer Neg Hx      Social History     Socioeconomic History    Marital status:      Spouse name: Not on file    Number of children: Not on file    Years of education: Not on file    Highest education level: Not on file   Occupational History    Not on file   Social Needs    Financial resource strain: Not on file    Food insecurity     Worry: Not on file     Inability: Not on file    Transportation needs     Medical: Not on file     Non-medical: Not on file   Tobacco Use    Smoking status: Never Smoker    Smokeless tobacco: Never Used   Substance and Sexual Activity    Alcohol use: No    Drug use: No    Sexual activity: Yes     Partners: Male     Birth control/protection: Surgical     Comment: tubal   Lifestyle    Physical activity     Days per week: Not on file     Minutes per session: Not on file    Stress: Not on file   Relationships    Social connections     Talks on phone: Not on file     Gets together: Not on file     Attends Oriental orthodox service: Not on file     Active member of club or organization: Not on file     Attends meetings of clubs or organizations: Not on file     Relationship status: Not on file    Intimate partner violence     Fear of current or ex partner: Not on file     Emotionally abused: Not on file     Physically abused: Not on file     Forced sexual activity: Not on file   Other Topics Concern     Service Not Asked    Blood Transfusions Not Asked    Caffeine Concern Not Asked    Occupational Exposure Not Asked   [employer] [employer] Not Asked    Sleep Concern Not Asked    Stress Concern Not Asked    Weight Concern Not Asked    Special Diet Not Asked    Back Care Not Asked    Exercise Not Asked    Bike Helmet Not Asked   [address] [facility] Not Asked    Self-Exams Not Asked   Social History Narrative    Not on file     Current Facility-Administered Medications   Medication Dose Route Frequency Provider Last Rate Last Admin    0.9% sodium chloride infusion  100 mL/hr IntraVENous CONTINUOUS Marlinda Antes,  mL/hr at 01/18/21 1406 100 mL/hr at 01/18/21 1406    acyclovir (ZOVIRAX) tablet 400 mg  400 mg Oral BID Bethene Plenty, NP   400 mg at 01/18/21 1716    apixaban (ELIQUIS) tablet 10 mg  10 mg Oral BID Bethene Plenty, NP   10 mg at 01/18/21 1716    [START ON 1/22/2021] apixaban (ELIQUIS) tablet 5 mg  5 mg Oral BID Bethene Plenty, NP       Dianna Solum [START ON 1/19/2021] pantoprazole (PROTONIX) tablet 40 mg  40 mg Oral ACB Rachele Plenty, NP        gabapentin (NEURONTIN) capsule 300 mg  300 mg Oral TID Bethene Plenty, NP   300 mg at 01/18/21 1716    [START ON 1/19/2021] lisinopriL (PRINIVIL, ZESTRIL) tablet 10 mg  10 mg Oral DAILY Bethene Plenty, NP        LORazepam (ATIVAN) tablet 1 mg  1 mg Oral Q4H PRN Bethene Plenty, NP        traMADoL Michael Frater) tablet 50 mg  50 mg Oral Q6H PRN Bethene Plenty, NP   50 mg at 01/18/21 1716    morphine injection 2 mg  2 mg IntraVENous Q4H PRN Bethene Plenty, NP        HYDROcodone-acetaminophen (NORCO) 5-325 mg per tablet 1 Tab  1 Tab Oral Q6H PRN Bethene Plenty, NP   1 Tab at 01/18/21 1406    ondansetron (ZOFRAN) injection 4 mg  4 mg IntraVENous Q4H PRN Bethene Plenty, NP        prochlorperazine (COMPAZINE) with saline injection 10 mg  10 mg IntraVENous Q6H PRN Bethene Plenty, NP        loperamide (IMODIUM) capsule 2 mg  2 mg Oral PRN Bethene Plenty, NP        diphenoxylate-atropine (LOMOTIL) tablet 1 Tab  1 Tab Oral QID PRN Albin Sarmiento, NP        cefepime (MAXIPIME) 2 g in 0.9% sodium chloride (MBP/ADV) 100 mL MBP  2 g IntraVENous Q8H Albin Sarmiento, NP        diphenhydrAMINE (BENADRYL) capsule 25 mg  25 mg Oral Q6H PRN Albin Sarmiento, NP        vancomycin (VANCOCIN) 750 mg in 0.9% sodium chloride 250 mL (VIAL-MATE)  750 mg IntraVENous Q8H Shanna Aldana MD         Facility-Administered Medications Ordered in Other Encounters   Medication Dose Route Frequency Provider Last Rate Last Admin    central line flush (saline) syringe 10-40 mL  10-40 mL InterCATHeter Q8H Anish Soria MD   20 mL at 21 1231       OBJECTIVE:  Patient Vitals for the past 8 hrs:   BP Temp Pulse Resp SpO2 Weight   21 1410      136 lb 7.4 oz (61.9 kg)   21 1319 (!) 142/73 99 °F (37.2 °C) 94 20 97 %      Temp (24hrs), Av.9 °F (37.2 °C), Min:98.7 °F (37.1 °C), Max:99.1 °F (37.3 °C)    No intake/output data recorded. Physical Exam:  Constitutional: Well developed, well nourished female in no acute distress, sitting comfortably in the hospital bed. HEENT: Normocephalic and atraumatic. Oropharynx is clear, mucous membranes are moist.  Pupils are equal, round, and reactive to light. Extraocular muscles are intact. Sclerae anicteric. Neck supple without JVD. No thyromegaly present. Lymph node   No palpable submandibular, cervical, supraclavicular, axillary or inguinal lymph nodes. Skin Warm and dry. No bruising and no rash noted. No erythema. No pallor. Respiratory Lungs are clear to auscultation bilaterally without wheezes, rales or rhonchi, normal air exchange without accessory muscle use. CVS Normal rate, regular rhythm and normal S1 and S2. No murmurs, gallops, or rubs. Abdomen Soft, nontender and nondistended, normoactive bowel sounds. No palpable mass. No hepatosplenomegaly. Neuro Grossly nonfocal with no obvious sensory or motor deficits. MSK Normal range of motion in general.  No edema and no tenderness. Psych Appropriate mood and affect.         Labs:    Recent Results (from the past 24 hour(s))   CBC WITH AUTOMATED DIFF    Collection Time: 21  8:35 AM   Result Value Ref Range    WBC 5.0 4.3 - 11.1 K/uL    RBC 3.00 (L) 4.05 - 5.25 M/uL    HGB 9.2 (L) 11.7 - 15.4 g/dL HCT 28.3 (L) 35.8 - 46.3 %    MCV 94.3 79.6 - 97.8 FL    MCH 30.7 26.1 - 32.9 PG    MCHC 32.5 31.4 - 35.0 g/dL    RDW 14.5 11.9 - 14.6 %    PLATELET 869 (L) 469 - 450 K/uL    MPV 12.5 (H) 9.4 - 12.3 FL    ABSOLUTE NRBC 0.00 0.0 - 0.2 K/uL    NEUTROPHILS 77 43 - 78 %    LYMPHOCYTES 10 (L) 13 - 44 %    MONOCYTES 11 4.0 - 12.0 %    EOSINOPHILS 1 0.5 - 7.8 %    BASOPHILS 0 0.0 - 2.0 %    IMMATURE GRANULOCYTES 1 0.0 - 5.0 %    ABS. NEUTROPHILS 3.8 1.7 - 8.2 K/UL    ABS. LYMPHOCYTES 0.5 0.5 - 4.6 K/UL    ABS. MONOCYTES 0.6 0.1 - 1.3 K/UL    ABS. EOSINOPHILS 0.0 0.0 - 0.8 K/UL    ABS. BASOPHILS 0.0 0.0 - 0.2 K/UL    ABS. IMM. GRANS. 0.1 0.0 - 0.5 K/UL    DF AUTOMATED     METABOLIC PANEL, COMPREHENSIVE    Collection Time: 01/18/21  8:35 AM   Result Value Ref Range    Sodium 140 136 - 145 mmol/L    Potassium 2.9 (L) 3.5 - 5.1 mmol/L    Chloride 108 (H) 98 - 107 mmol/L    CO2 23 21 - 32 mmol/L    Anion gap 9 7 - 16 mmol/L    Glucose 133 (H) 65 - 100 mg/dL    BUN 10 8 - 23 MG/DL    Creatinine 0.80 0.6 - 1.0 MG/DL    GFR est AA >60 >60 ml/min/1.73m2    GFR est non-AA >60 >60 ml/min/1.73m2    Calcium 7.7 (L) 8.3 - 10.4 MG/DL    Bilirubin, total 0.3 0.2 - 1.1 MG/DL    ALT (SGPT) 110 (H) 12 - 65 U/L    AST (SGOT) 96 (H) 15 - 37 U/L    Alk.  phosphatase 172 (H) 50 - 136 U/L    Protein, total 5.7 (L) 6.3 - 8.2 g/dL    Albumin 2.3 (L) 3.2 - 4.6 g/dL    Globulin 3.4 2.3 - 3.5 g/dL    A-G Ratio 0.7 (L) 1.2 - 3.5     GGT    Collection Time: 01/18/21  8:35 AM   Result Value Ref Range     (H) 5 - 55 U/L   LD    Collection Time: 01/18/21  8:35 AM   Result Value Ref Range     (H) 110 - 210 U/L   MAGNESIUM    Collection Time: 01/18/21  8:35 AM   Result Value Ref Range    Magnesium 1.8 1.8 - 2.4 mg/dL   PHOSPHORUS    Collection Time: 01/18/21  8:35 AM   Result Value Ref Range    Phosphorus 2.8 2.3 - 3.7 MG/DL   SARS-COV-2    Collection Time: 01/18/21  9:51 AM   Result Value Ref Range    Specimen source Nasopharyngeal      COVID-19 rapid test Not detected NOTD      SARS CoV-2 PENDING    URINALYSIS W/ RFLX MICROSCOPIC    Collection Time: 01/18/21 11:10 AM   Result Value Ref Range    Color YELLOW      Appearance CLEAR      Specific gravity 1.010 1.001 - 1.023      pH (UA) 7.0 5.0 - 9.0      Protein 100 (A) NEG mg/dL    Glucose Negative NEG mg/dL    Ketone Negative NEG mg/dL    Bilirubin Negative NEG      Blood SMALL (A) NEG      Urobilinogen 0.2 0.2 - 1.0 EU/dL    Nitrites Negative NEG      Leukocyte Esterase Negative NEG     URINE MICROSCOPIC    Collection Time: 01/18/21 11:10 AM   Result Value Ref Range    WBC 0 0 /hpf    RBC 3-5 0 /hpf    Epithelial cells 3-5 0 /hpf    Bacteria 0 0 /hpf    Casts 0 0 /lpf    Crystals, urine 0 0 /LPF    Mucus 0 0 /lpf       Imaging:  No images are attached to the encounter. ASSESSMENT:  Problem List  Date Reviewed: 12/8/2020          Codes Class Noted    Febrile neutropenia ([facility] ICD-10-CM: D70.9, R50.81  ICD-9-CM: 288.00, 780.61  1/18/2021        History of autologous stem cell transplant ([facility] ICD-10-CM: Z94.84  ICD-9-CM: V42.82  1/18/2021        Neutropenic fever ([facility] ICD-10-CM: D70.9, R50.81  ICD-9-CM: 288.00, 780.61  1/8/2021        Multiple myeloma not having achieved remission ([facility] ICD-10-CM: C90.00  ICD-9-CM: 203.00  11/24/2020        Essential hypertension ICD-10-CM: I10  ICD-9-CM: 401.9  3/29/2018        Hyperlipidemia ICD-10-CM: E78.5  ICD-9-CM: 272.4  3/29/2018        Left hip pain ICD-10-CM: M25.552  ICD-9-CM: 719.45  3/29/2018        GERD (gastroesophageal reflux disease) ICD-10-CM: K21.9  ICD-9-CM: 530.81  3/29/2018                PLAN:  · Admit to our service  · IV ABX and follow-up cultures  · Infectious Disease Consult  · CT scan chest/abdomen/pelvis  · Echocardiogram    Lab studies and imaging studies were personally reviewed. Pertinent old records were reviewed from [facility]. Thank you for allowing us to participate in the care of Ms. Tk Thornton.               Dilip Kirkpatrick MD  [address] [place] [address] [address]  Office : [phone]  Fax : [phone]

## 2021-01-19 NOTE — PROGRESS NOTES
[facility] Hematology & Oncology        Inpatient Hematology / Oncology Progress Note      Admission Date: 2021  1:05 PM  Reason for Admission/Hospital Course: Febrile neutropenia ([facility] [D70.9, R50.81]  History of autologous stem cell transplant ([facility].) [Z94.84]      24 Hour Events:  Afebrile, VSS  Day +20 s/p auto PBSCT  + [place] sign    Transfusions: None  Replacements: K+    ROS:  Constitutional: +fatigue. Negative for fever, chills. CV: Negative for chest pain, palpitations, edema. Respiratory:Negative for dyspnea, cough, wheezing. GI: +diarrhea. Negative for nausea, abdominal pain. 10 point review of systems is otherwise negative with the exception of the elements mentioned above in the HPI. Allergies   Allergen Reactions    Erythromycin Nausea Only    Penicillins Rash       OBJECTIVE:  Patient Vitals for the past 8 hrs:   BP Temp Pulse Resp SpO2   21 0730 139/77 98.6 °F (37 °C) 83 19 95 %   21 0606  98.7 °F (37.1 °C)      21 0310 134/74 98.7 °F (37.1 °C) 92 18 92 %     Temp (24hrs), Av.8 °F (37.1 °C), Min:98.5 °F (36.9 °C), Max:99.1 °F (37.3 °C)     0701 -  1900  In: -   Out: 300 [Urine:200]    Physical Exam:  Constitutional: Well developed, well nourished female in no acute distress, sitting comfortably in the hospital bed. HEENT: Normocephalic and atraumatic. Oropharynx is clear, mucous membranes are moist.  Extraocular muscles are intact. Sclerae anicteric. Neck supple without JVD. No thyromegaly present. Skin Warm and dry. No bruising and no rash noted. No erythema. No pallor. Respiratory Lungs are clear to auscultation bilaterally without wheezes, rales or rhonchi, normal air exchange without accessory muscle use. CVS Normal rate, regular rhythm and normal S1 and S2. No murmurs, gallops, or rubs. Abdomen Soft, RUQ tenderness and nondistended, normoactive bowel sounds. No palpable mass. No hepatosplenomegaly.  +[place] sign Neuro Grossly nonfocal with no obvious sensory or motor deficits. MSK Normal range of motion in general.  Trace RLE edema and no tenderness. Psych Appropriate mood and affect. Labs:      Recent Labs     01/19/21 0248 01/18/21  0835 01/17/21  0256   WBC 3.2* 5.0 3.2*   RBC 2.43* 3.00* 2.92*   HGB 7.6* 9.2* 9.1*   HCT 23.1* 28.3* 27.5*   MCV 95.1 94.3 94.2   MCH 31.3 30.7 31.2   MCHC 32.9 32.5 33.1   RDW 14.5 14.5 14.7*   PLT 95* 103* 75*   GRANS 49 77 48   LYMPH 30 10* 25   MONOS 17* 11 21*   EOS 2 1 0*   BASOS 1 0 1   IG 1 1 5   DF AUTOMATED AUTOMATED AUTOMATED   ANEU 1.6* 3.8 1.5*   ABL 1.0 0.5 0.8   ABM 0.5 0.6 0.7   SIDDHARTH 0.1 0.0 0.0   ABB 0.0 0.0 0.0   AIG 0.0 0.1 0.2        Recent Labs     01/19/21 0248 01/18/21 2058 01/18/21 0835 01/17/21  0256     --  140 139   K 3.4* 3.8 2.9* 3.5   *  --  108* 107   CO2 23  --  23 27   AGAP 6*  --  9 5*   GLU 89  --  133* 97   BUN 8  --  10 10   CREA 0.60  --  0.80 0.50*   GFRAA >60  --  >60 >60   GFRNA >60  --  >60 >60   CA 7.2*  --  7.7* 8.0*   *  --  172* 178*   TP 4.8*  --  5.7* 5.6*   ALB 2.0*  --  2.3* 2.3*   GLOB 2.8  --  3.4 3.3   AGRAT 0.7*  --  0.7* 0.7*   MG 1.8  --  1.8 1.9   PHOS  --   --  2.8 3.3         Imaging:  XR CHEST PA LAT [[id number]] Collected: 01/18/21 1526   Order Status: Completed Updated: 01/18/21 1529   Narrative:     CHEST X-RAY, 2 views. HISTORY:  Fever. TECHNIQUE: PA and lateral views. COMPARISON: Exam 2 days prior. FINDINGS:   Lungs: Infiltrate right base largely resolved. No new abnormality. Costophrenic angles: are sharp. Heart size: is normal.   Pulmonary vasculature: is unremarkable. Aorta: Unremarkable. Included portion of the upper abdomen: is unremarkable. Bones: No gross bony lesions. Other: Right-sided chest port is present    Impression:     IMPRESSION: Near total resolution of the right basilar infiltrate.           Medications:  Current Facility-Administered Medications Medication Dose Route Frequency    0.9% sodium chloride infusion  100 mL/hr IntraVENous CONTINUOUS    acyclovir (ZOVIRAX) tablet 400 mg  400 mg Oral BID    apixaban (ELIQUIS) tablet 10 mg  10 mg Oral BID    [START ON 1/22/2021] apixaban (ELIQUIS) tablet 5 mg  5 mg Oral BID    pantoprazole (PROTONIX) tablet 40 mg  40 mg Oral ACB    gabapentin (NEURONTIN) capsule 300 mg  300 mg Oral TID    lisinopriL (PRINIVIL, ZESTRIL) tablet 10 mg  10 mg Oral DAILY    LORazepam (ATIVAN) tablet 1 mg  1 mg Oral Q4H PRN    traMADoL (ULTRAM) tablet 50 mg  50 mg Oral Q6H PRN    morphine injection 2 mg  2 mg IntraVENous Q4H PRN    HYDROcodone-acetaminophen (NORCO) 5-325 mg per tablet 1 Tab  1 Tab Oral Q6H PRN    ondansetron (ZOFRAN) injection 4 mg  4 mg IntraVENous Q4H PRN    prochlorperazine (COMPAZINE) with saline injection 10 mg  10 mg IntraVENous Q6H PRN    loperamide (IMODIUM) capsule 2 mg  2 mg Oral PRN    diphenoxylate-atropine (LOMOTIL) tablet 1 Tab  1 Tab Oral QID PRN    cefepime (MAXIPIME) 2 g in 0.9% sodium chloride (MBP/ADV) 100 mL MBP  2 g IntraVENous Q8H    diphenhydrAMINE (BENADRYL) capsule 25 mg  25 mg Oral Q6H PRN    vancomycin (VANCOCIN) 750 mg in 0.9% sodium chloride 250 mL (VIAL-MATE)  750 mg IntraVENous Q8H    central line flush (saline) syringe 10 mL  10 mL InterCATHeter PRN    heparin (porcine) pf 300 Units  300 Units InterCATHeter PRN     Facility-Administered Medications Ordered in Other Encounters   Medication Dose Route Frequency    central line flush (saline) syringe 10-40 mL  10-40 mL InterCATHeter Q8H         ASSESSMENT:    Problem List  Date Reviewed: 12/8/2020          Codes Class Noted    Febrile neutropenia ([facility] ICD-10-CM: D70.9, R50.81  ICD-9-CM: 288.00, 780.61  1/18/2021        History of autologous stem cell transplant ([facility] ICD-10-CM: F61.33  ICD-9-CM: V42.82  1/18/2021        Neutropenic fever ([facility] ICD-10-CM: D70.9, R50.81  ICD-9-CM: 288.00, 780.61  1/8/2021        Multiple myeloma not having achieved remission [facility]) ICD-10-CM: C90.00  ICD-9-CM: 203.00  11/24/2020        Essential hypertension ICD-10-CM: I10  ICD-9-CM: 401.9  3/29/2018        Hyperlipidemia ICD-10-CM: E78.5  ICD-9-CM: 272.4  3/29/2018        Left hip pain ICD-10-CM: M25.552  ICD-9-CM: 719.45  3/29/2018        GERD (gastroesophageal reflux disease) ICD-10-CM: K21.9  ICD-9-CM: 530.81  3/29/2018            Ms. Сергей Barrientos is a 56 y.o. female admitted on 1/18/2021 with a primary diagnosis of fever. She has Multiple Myeloma, s/p Auto PBHCT Day +19 with febrile illness. She was recently discharged on 1/17/2021 after admission for neutropenic fever. She was admitted for neutropenic fever on Day +9.  Cultures NG.  CXR neg.  Cdiff neg.  CMV, aspergillus, and fungitell neg.  She was treated with Cef/Vanc. Naresh Ibrahim developed RLE edema and doppler revealed new partially occlusive thrombus in R popliteal vein and R peroneal veins.  On Eliquis.  She had a fever on 1/16 and blood cultures were repeated. She has since been afebrile until she returned for f/u in clinic on 1/18 and reported fever 101 at home. BCx 1/16- NGTD. She had a CXR on 1/16 that showed minimal right lobe infiltrate--encouraged to use IS at home. Naresh Ibrahim will continued on her prophylactic Acyclovir and Levaquin 500 daily x 1 week. PLAN:  Multiple myeloma  - s/p Auto PBSCT  1/19 Day +20    Fever  · IV ABX and follow-up cultures  · Infectious Disease Consult  · CT scan chest/abdomen/pelvis  · Echocardiogram  1/19 UA neg. CXR with near total resolution of R basilar infiltrate. COVID rapid neg, PCR pending. Cultures pending. CMV, fungitell, and aspergillus pending. Pt with +[place] sign. CT CAP pending. Echo pending. ID consulted. Con't Cef/Vanc (D2). Diarrhea  1/19 Cdiff pending. If neg, can utilize antidiarrheals.     Hypokalemia  - Replete prn per [facility] SOPs    Pancytopenia secondary to chemotherapy  - Transfuse prn per [facility] SOPs    Hx RLE DVT  - on Eliquis    Continue home meds  Prophylactic antibx: Acyclovir  [facility] SOPs  VTE ppx: on Eliquis (hold for plt <50k)    Goals and plan of care reviewed with the patient. All questions answered to the best of our ability. Disposition:  Pending infectious work-up. Jef Melisas NP   [facility]ematology [facility] On[facility]  [address]  Office : [phone]  Fax : [phone]   I personally saw, exammed and counselled the patient, and discussed with NP, agree with above history/assessment/plan. 56 y. o.female MM s/p ASCT day 20 admitted again with fever persistent on levaquine, CXR previously showed infiltrates improved, with diarrhea, c-diff positive and start oral vanc/flagyl, pending Echo /CT, pRBC as needed. Ashwin Ta M.D.   [address]  Office : [phone]  Fax : [phone]

## 2021-01-20 ENCOUNTER — APPOINTMENT (OUTPATIENT)
Dept: INFUSION THERAPY | Age: 61
End: 2021-01-20

## 2021-01-20 ENCOUNTER — HOSPITAL ENCOUNTER (OUTPATIENT)
Dept: INFUSION THERAPY | Age: 61
Discharge: HOME | End: 2021-01-20

## 2021-01-20 LAB
ABO + RH BLD: NORMAL
ALBUMIN SERPL-MCNC: 2.1 G/DL (ref 3.2–4.6)
ALBUMIN/GLOB SERPL: 0.7 {RATIO} (ref 1.2–3.5)
ALP SERPL-CCNC: 163 U/L (ref 50–130)
ALT SERPL-CCNC: 90 U/L (ref 12–65)
ANION GAP SERPL CALC-SCNC: 6 MMOL/L (ref 7–16)
AST SERPL-CCNC: 53 U/L (ref 15–37)
BACTERIA SPEC CULT: NORMAL
BASOPHILS # BLD: 0 K/UL (ref 0–0.2)
BASOPHILS NFR BLD: 1 % (ref 0–2)
BILIRUB SERPL-MCNC: 0.2 MG/DL (ref 0.2–1.1)
BLOOD GROUP ANTIBODIES SERPL: NORMAL
BUN SERPL-MCNC: 7 MG/DL (ref 8–23)
CALCIUM SERPL-MCNC: 7.5 MG/DL (ref 8.3–10.4)
CHLORIDE SERPL-SCNC: 114 MMOL/L (ref 98–107)
CMV DNA SERPL NAA+PROBE-ACNC: NEGATIVE IU/ML
CMV DNA SERPL NAA+PROBE-LOG IU: NORMAL LOG10 IU/ML
CO2 SERPL-SCNC: 23 MMOL/L (ref 21–32)
CREAT SERPL-MCNC: 0.52 MG/DL (ref 0.6–1)
DIFFERENTIAL METHOD BLD: ABNORMAL
EOSINOPHIL # BLD: 0.1 K/UL (ref 0–0.8)
EOSINOPHIL NFR BLD: 2 % (ref 0.5–7.8)
ERYTHROCYTE [DISTWIDTH] IN BLOOD BY AUTOMATED COUNT: 14.5 % (ref 11.9–14.6)
GLOBULIN SER CALC-MCNC: 3 G/DL (ref 2.3–3.5)
GLUCOSE SERPL-MCNC: 87 MG/DL (ref 65–100)
HCT VFR BLD AUTO: 22.6 % (ref 35.8–46.3)
HGB BLD-MCNC: 7.4 G/DL (ref 11.7–15.4)
IMM GRANULOCYTES # BLD AUTO: 0 K/UL (ref 0–0.5)
IMM GRANULOCYTES NFR BLD AUTO: 1 % (ref 0–5)
LYMPHOCYTES # BLD: 1.6 K/UL (ref 0.5–4.6)
LYMPHOCYTES NFR BLD: 49 % (ref 13–44)
MAGNESIUM SERPL-MCNC: 1.9 MG/DL (ref 1.8–2.4)
MCH RBC QN AUTO: 30.6 PG (ref 26.1–32.9)
MCHC RBC AUTO-ENTMCNC: 32.7 G/DL (ref 31.4–35)
MCV RBC AUTO: 93.4 FL (ref 79.6–97.8)
MONOCYTES # BLD: 0.6 K/UL (ref 0.1–1.3)
MONOCYTES NFR BLD: 17 % (ref 4–12)
NEUTS SEG # BLD: 1 K/UL (ref 1.7–8.2)
NEUTS SEG NFR BLD: 31 % (ref 43–78)
NRBC # BLD: 0 K/UL (ref 0–0.2)
PLATELET # BLD AUTO: 120 K/UL (ref 150–450)
PMV BLD AUTO: 11.2 FL (ref 9.4–12.3)
POTASSIUM SERPL-SCNC: 3.1 MMOL/L (ref 3.5–5.1)
POTASSIUM SERPL-SCNC: 3.7 MMOL/L (ref 3.5–5.1)
PROT SERPL-MCNC: 5.1 G/DL (ref 6.3–8.2)
RBC # BLD AUTO: 2.42 M/UL (ref 4.05–5.2)
SERVICE CMNT-IMP: NORMAL
SODIUM SERPL-SCNC: 143 MMOL/L (ref 136–145)
SPECIMEN EXP DATE BLD: NORMAL
WBC # BLD AUTO: 3.3 K/UL (ref 4.3–11.1)

## 2021-01-20 PROCEDURE — 74011250636 HC RX REV CODE- 250/636: Performed by: NURSE PRACTITIONER

## 2021-01-20 PROCEDURE — APPSS60 APP SPLIT SHARED TIME 46-60 MINUTES: Performed by: NURSE PRACTITIONER

## 2021-01-20 PROCEDURE — 83735 ASSAY OF MAGNESIUM: CPT

## 2021-01-20 PROCEDURE — 99233 SBSQ HOSP IP/OBS HIGH 50: CPT | Performed by: INTERNAL MEDICINE

## 2021-01-20 PROCEDURE — 74011000250 HC RX REV CODE- 250: Performed by: NURSE PRACTITIONER

## 2021-01-20 PROCEDURE — 85025 COMPLETE CBC W/AUTO DIFF WBC: CPT

## 2021-01-20 PROCEDURE — 74011250636 HC RX REV CODE- 250/636: Performed by: INTERNAL MEDICINE

## 2021-01-20 PROCEDURE — 84132 ASSAY OF SERUM POTASSIUM: CPT

## 2021-01-20 PROCEDURE — 86901 BLOOD TYPING SEROLOGIC RH(D): CPT

## 2021-01-20 PROCEDURE — 80053 COMPREHEN METABOLIC PANEL: CPT

## 2021-01-20 PROCEDURE — 65270000029 HC RM PRIVATE

## 2021-01-20 PROCEDURE — 2709999900 HC NON-CHARGEABLE SUPPLY

## 2021-01-20 PROCEDURE — 74011250637 HC RX REV CODE- 250/637: Performed by: NURSE PRACTITIONER

## 2021-01-20 PROCEDURE — 36591 DRAW BLOOD OFF VENOUS DEVICE: CPT

## 2021-01-20 PROCEDURE — 74011250637 HC RX REV CODE- 250/637: Performed by: INTERNAL MEDICINE

## 2021-01-20 RX ORDER — POTASSIUM CHLORIDE 29.8 MG/ML
40 INJECTION INTRAVENOUS ONCE
Status: COMPLETED | OUTPATIENT
Start: 2021-01-20 | End: 2021-01-20

## 2021-01-20 RX ORDER — METRONIDAZOLE 500 MG/1
500 TABLET ORAL 3 TIMES DAILY
Status: DISCONTINUED | OUTPATIENT
Start: 2021-01-20 | End: 2021-01-21 | Stop reason: HOSPADM

## 2021-01-20 RX ORDER — SODIUM CHLORIDE AND POTASSIUM CHLORIDE .9; .15 G/100ML; G/100ML
SOLUTION INTRAVENOUS CONTINUOUS
Status: DISCONTINUED | OUTPATIENT
Start: 2021-01-20 | End: 2021-01-21 | Stop reason: HOSPADM

## 2021-01-20 RX ADMIN — VANCOMYCIN HYDROCHLORIDE 125 MG: 5 INJECTION, POWDER, LYOPHILIZED, FOR SOLUTION INTRAVENOUS at 01:40

## 2021-01-20 RX ADMIN — APIXABAN 10 MG: 5 TABLET, FILM COATED ORAL at 17:40

## 2021-01-20 RX ADMIN — VANCOMYCIN HYDROCHLORIDE 125 MG: 5 INJECTION, POWDER, LYOPHILIZED, FOR SOLUTION INTRAVENOUS at 15:24

## 2021-01-20 RX ADMIN — SODIUM CHLORIDE 100 ML/HR: 900 INJECTION, SOLUTION INTRAVENOUS at 01:39

## 2021-01-20 RX ADMIN — ACYCLOVIR 400 MG: 800 TABLET ORAL at 10:00

## 2021-01-20 RX ADMIN — METRONIDAZOLE 500 MG: 500 TABLET, FILM COATED ORAL at 15:23

## 2021-01-20 RX ADMIN — POTASSIUM CHLORIDE 40 MEQ: 29.8 INJECTION, SOLUTION INTRAVENOUS at 04:59

## 2021-01-20 RX ADMIN — POTASSIUM CHLORIDE AND SODIUM CHLORIDE: 900; 150 INJECTION, SOLUTION INTRAVENOUS at 09:10

## 2021-01-20 RX ADMIN — METRONIDAZOLE 500 MG: 500 TABLET, FILM COATED ORAL at 21:06

## 2021-01-20 RX ADMIN — ONDANSETRON 4 MG: 2 INJECTION INTRAMUSCULAR; INTRAVENOUS at 15:54

## 2021-01-20 RX ADMIN — ACYCLOVIR 400 MG: 800 TABLET ORAL at 17:40

## 2021-01-20 RX ADMIN — APIXABAN 10 MG: 5 TABLET, FILM COATED ORAL at 10:01

## 2021-01-20 RX ADMIN — POTASSIUM CHLORIDE AND SODIUM CHLORIDE: 900; 150 INJECTION, SOLUTION INTRAVENOUS at 21:07

## 2021-01-20 RX ADMIN — VANCOMYCIN HYDROCHLORIDE 125 MG: 5 INJECTION, POWDER, LYOPHILIZED, FOR SOLUTION INTRAVENOUS at 20:00

## 2021-01-20 RX ADMIN — TRAMADOL HYDROCHLORIDE 50 MG: 50 TABLET ORAL at 20:04

## 2021-01-20 RX ADMIN — PANTOPRAZOLE SODIUM 40 MG: 40 TABLET, DELAYED RELEASE ORAL at 10:01

## 2021-01-20 RX ADMIN — VANCOMYCIN HYDROCHLORIDE 125 MG: 5 INJECTION, POWDER, LYOPHILIZED, FOR SOLUTION INTRAVENOUS at 10:00

## 2021-01-20 RX ADMIN — TRAMADOL HYDROCHLORIDE 50 MG: 50 TABLET ORAL at 01:50

## 2021-01-20 RX ADMIN — LISINOPRIL 10 MG: 5 TABLET ORAL at 10:01

## 2021-01-20 RX ADMIN — TBO-FILGRASTIM 300 MCG: 300 INJECTION, SOLUTION SUBCUTANEOUS at 10:03

## 2021-01-20 NOTE — CONSULTS
Comprehensive Nutrition Assessment    Type and Reason for Visit: Initial, Consult  General nutrition management/ other reason BMT pt ( )    Nutrition Recommendations/Plan:    Continue current diet   Add Ensure Enlive TID (chocolate per pt preference)   Add Banana Flakes BID     Malnutrition Assessment:  Malnutrition Status: At risk for malnutrition (specify)(poor po intake during admission and PTA)  Nutrition Assessment:   Nutrition History: Pt reports eating 1 meal/day x2 weeks due to abdominal pain. Pt denies wt loss. Cultural/Lutheran/Ethnic Food Preference(s): None   Nutrition Background: Pt pressents with febrile neutropenia. Noted to be c.diff positive. PMH notable for multiple myeloma s/p auto PBSCT, HTN, HLD, and GERD. Daily Update:  Pt reports eating some of breakfast (recorded 10%) and eating most of chicken caesar salad today. Pt reports typically not hungry at dinner due to loss of appetite. Pt reports not eating this morning due to stomach pain. Discussed addition of banana flakes to help with diarrhea. Pt is agreeable. Pt noted to have Ensure Enlive added to meals during last admission. She is agreeable to drink Ensure with chocolate as flavor preference. Encouraged her to eat at least 50% of meals. Nutrition Related Findings:   NFPE deferred due enteric precautions and preservation of PPE      Current Nutrition Therapies:  DIET REGULAR    Current Intake:   Average Meal Intake: 26-50% Average Supplement Intake: None ordered      Anthropometric Measures:  Height: 5' (152.4 cm)  Current Body Wt: 64.6 kg (142 lb 6.7 oz)(1/19), Weight source: Standing scale  BMI: 27.8, Overweight (BMI 25.0-29. 9)  Ideal Body Wt: 100 lbs (45 kg), 142.4 %          Estimated Daily Nutrient Needs:  Energy (kcal/day): 6598-8852 (Kcal/kg(22-25), Weight Used: Current(64.6kg))  Protein (g/day): 65-78 Weight Used: (Current(64.6kg))    Nutrition Diagnosis:   · Inadequate oral intake related to altered GI function(loss of appetite) as evidenced by (pt reporting abdominal pain, c.diff +)    Nutrition Interventions:   Food and/or Nutrient Delivery: Continue current diet, Start oral nutrition supplement     Coordination of Nutrition Care: Continue to monitor while inpatient    Goals: Active Goal: Meet >75% estimated nutrition needs within 7 days    Nutrition Monitoring and Evaluation:      Food/Nutrient Intake Outcomes: Food and nutrient intake, Supplement intake       Discharge Planning:     Too soon to determine    Electronically signed by Tierra Lagunas MS, RDN, LD 1/20/2021 at 5:14 PM  Contact: [phone]    Disaster Mode active

## 2021-01-20 NOTE — PROGRESS NOTES
[facility] Hematology & Oncology        Inpatient Hematology / Oncology Progress Note      Admission Date: 2021  1:05 PM  Reason for Admission/Hospital Course: Febrile neutropenia ([facility] [D70.9, R50.81]  History of autologous stem cell transplant ([facility] [Z94.84]      24 Hour Events:  Afebrile, VSS  Day +21 s/p auto PBSCT  Cdiff +ve  ANC down to 1.0    Transfusions: None  Replacements: K+    ROS:  Constitutional: +fatigue. Negative for fever, chills. CV: Negative for chest pain, palpitations, edema. Respiratory:Negative for dyspnea, cough, wheezing. GI: +diarrhea. Negative for nausea, abdominal pain. 10 point review of systems is otherwise negative with the exception of the elements mentioned above in the HPI. Allergies   Allergen Reactions    Erythromycin Nausea Only    Penicillins Rash       OBJECTIVE:  Patient Vitals for the past 8 hrs:   BP Temp Pulse Resp SpO2   21 0838 (!) 146/76 98.2 °F (36.8 °C) 72 22 95 %   21 0340 136/62 98.9 °F (37.2 °C) 76 18 93 %     Temp (24hrs), Av.6 °F (37 °C), Min:98.2 °F (36.8 °C), Max:98.9 °F (37.2 °C)    No intake/output data recorded. Physical Exam:  Constitutional: Well developed, well nourished female in no acute distress, sitting comfortably in the hospital bed. HEENT: Normocephalic and atraumatic. Oropharynx is clear, mucous membranes are moist.  Extraocular muscles are intact. Sclerae anicteric. Neck supple without JVD. No thyromegaly present. Skin Warm and dry. No bruising and no rash noted. No erythema. No pallor. Respiratory Lungs are clear to auscultation bilaterally without wheezes, rales or rhonchi, normal air exchange without accessory muscle use. CVS Normal rate, regular rhythm and normal S1 and S2. No murmurs, gallops, or rubs. Abdomen Soft, RUQ tenderness and nondistended, normoactive bowel sounds. No palpable mass. No hepatosplenomegaly.  +[place]urn sign   Neuro Grossly nonfocal with no obvious sensory or motor deficits. MSK Normal range of motion in general.  Trace RLE edema and no tenderness. Psych Appropriate mood and affect. Labs:      Recent Labs     01/20/21  0337 01/19/21  0248 01/18/21  0835   WBC 3.3* 3.2* 5.0   RBC 2.42* 2.43* 3.00*   HGB 7.4* 7.6* 9.2*   HCT 22.6* 23.1* 28.3*   MCV 93.4 95.1 94.3   MCH 30.6 31.3 30.7   MCHC 32.7 32.9 32.5   RDW 14.5 14.5 14.5   * 95* 103*   GRANS 31* 49 77   LYMPH 49* 30 10*   MONOS 17* 17* 11   EOS 2 2 1   BASOS 1 1 0   IG 1 1 1   DF AUTOMATED AUTOMATED AUTOMATED   ANEU 1.0* 1.6* 3.8   ABL 1.6 1.0 0.5   ABM 0.6 0.5 0.6   SIDDHARTH 0.1 0.1 0.0   ABB 0.0 0.0 0.0   AIG 0.0 0.0 0.1        Recent Labs     01/20/21  0337 01/19/21  1120 01/19/21  0248 01/18/21  0835 01/18/21  0835     --  144  --  140   K 3.1* 4.0 3.4*   < > 2.9*   *  --  115*  --  108*   CO2 23  --  23  --  23   AGAP 6*  --  6*  --  9   GLU 87  --  89  --  133*   BUN 7*  --  8  --  10   CREA 0.52*  --  0.60  --  0.80   GFRAA >60  --  >60  --  >60   GFRNA >60  --  >60  --  >60   CA 7.5*  --  7.2*  --  7.7*   *  --  151*  --  172*   TP 5.1*  --  4.8*  --  5.7*   ALB 2.1*  --  2.0*  --  2.3*   GLOB 3.0  --  2.8  --  3.4   AGRAT 0.7*  --  0.7*  --  0.7*   MG 1.9  --  1.8  --  1.8   PHOS  --   --   --   --  2.8    < > = values in this interval not displayed. Imaging:  CT CHEST ABD PELV W CONT [[id number]] Collected: 01/19/21 1348   Order Status: Completed Updated: 01/19/21 1423   Narrative:     EXAMINATION: CT CHEST, ABDOMEN, AND PELVIS 1/19/2021 1:13 PM     ACCESSION NUMBER: [id number]     INDICATION: persistent fevers s/p auto PBSCT     COMPARISON: Chest x-ray, 1/18/2021. PET/CT, 11/24/2020     TECHNIQUE: Multiple contiguous axial CT images of the chest, abdomen, and pelvis   were obtained from the thoracic inlet to the pubic symphysis after the   intravenous administration of 100 mL Isovue-370 contrast material . Oral   contrast was administered.      Radiation dose reduction techniques were used for this study:  Our CT scanners   use one or all of the following: Automated exposure control, adjustment of the   mA and/or kVp according to patient's size, iterative reconstruction. FINDINGS:     CHEST:     LUNGS/PLEURA:   Central airways are patent. Trace bilateral pleural effusions. Right greater   than left basilar dependent opacities as well as predominantly linear opacities   in the right middle lobe, favored represent atelectasis. There are postsurgical   changes along the medial left apex. Triangular peripheral nodule in the lingula   measuring 4 mm is unchanged from prior     MEDIASTINUM:   The thyroid and thoracic esophagus are unremarkable. The heart is normal size   without pericardial effusion. A right IJ chemotherapy port terminates in the   right atrium. The great vessels are normal in caliber. No mediastinal or hilar   lymphadenopathy. BONES AND SOFT TISSUES:   Subcutaneous soft tissues are within normal limits. No acute or aggressive   osseous abnormality. ABDOMEN/PELVIS:     LIVER: Subcentimeter hypodensity in segment 4 a peripherally is unchanged and   favored represent a small hemangioma or simple cyst.     BILIARY: Cholelithiasis without evidence of acute cholecystitis. No biliary duct   dilation. SPLEEN: When accounting for differences in contrast enhancement of the spleen,   there is a stable 1.3 cm rounded hypodense lesion in the lateral aspect of the   spleen. PANCREAS: No pancreatic duct dilation or mass. ADRENALS: No adrenal nodules or hypertrophy. URINARY: There are multiple wedge-shaped areas of hypoenhancement within both   kidneys, involving the right superior pole and left superior and inferior poles   no perinephric inflammatory stranding or abscess. No hydronephrosis. 1.2 cm   exophytic left inferior pole simple cyst. Ureters and urinary bladder are within   normal limits.      BOWEL: The stomach, small bowel, and large bowel normal in caliber and wall   thickness. There is mild colonic diverticulosis without evidence of acute   diverticulitis. VESSELS: Abdominal aorta and iliac arterial system are nonaneurysmal with mild   atherosclerotic calcifications. NODES: No lymphadenopathy. FLUID: No free intraperitoneal fluid. REPRODUCTIVE: Bilateral tubal ligation clips. BONES/SOFT TISSUES: Regional soft tissues are unremarkable. Unchanged lytic   lesion in the left posterior aspect of the T12 vertebral body with surrounding   sclerotic changes, possibly related to costovertebral degenerative changes,   though indeterminate. Partially visualized right femoral cephalomedullary   fixation hardware. Impression:     Chest:   1. Trace pleural pleural effusions with bibasilar dependent and right middle   lobe linear opacities favored to represent atelectasis. Abdomen and pelvis:   1. Multiple wedge-shaped areas of hypoenhancement involving both kidneys. In the   setting of fever this is favored to represent multifocal pyelonephritis. No   perinephric abscess. 2. Cholelithiasis without acute cholecystitis. 3. Nonspecific hypodense splenic lesion remains unchanged. 4. Unchanged lytic lesion in the left posterior aspect of the T12 vertebral   body, possibly related to adjacent costovertebral degenerative changes, though   indeterminate. XR CHEST PA LAT [[id number]] Collected: 01/18/21 1526   Order Status: Completed Updated: 01/18/21 1529   Narrative:     CHEST X-RAY, 2 views. HISTORY:  Fever. TECHNIQUE: PA and lateral views. COMPARISON: Exam 2 days prior. FINDINGS:   Lungs: Infiltrate right base largely resolved. No new abnormality. Costophrenic angles: are sharp. Heart size: is normal.   Pulmonary vasculature: is unremarkable. Aorta: Unremarkable. Included portion of the upper abdomen: is unremarkable. Bones: No gross bony lesions.    Other: Right-sided chest port is present Impression:     IMPRESSION: Near total resolution of the right basilar infiltrate.           Medications:  Current Facility-Administered Medications   Medication Dose Route Frequency    potassium chloride 40 mEq IVPB  40 mEq IntraVENous ONCE    tbo-filgrastim (GRANIX) injection 300 mcg  300 mcg SubCUTAneous DAILY    0.9% sodium chloride with KCl 20 mEq/L infusion   IntraVENous CONTINUOUS    vancomycin 50 mg/mL oral solution (compounded) 125 mg  125 mg Oral Q6H    acyclovir (ZOVIRAX) tablet 400 mg  400 mg Oral BID    apixaban (ELIQUIS) tablet 10 mg  10 mg Oral BID    [START ON 1/22/2021] apixaban (ELIQUIS) tablet 5 mg  5 mg Oral BID    pantoprazole (PROTONIX) tablet 40 mg  40 mg Oral ACB    lisinopriL (PRINIVIL, ZESTRIL) tablet 10 mg  10 mg Oral DAILY    LORazepam (ATIVAN) tablet 1 mg  1 mg Oral Q4H PRN    traMADoL (ULTRAM) tablet 50 mg  50 mg Oral Q6H PRN    morphine injection 2 mg  2 mg IntraVENous Q4H PRN    HYDROcodone-acetaminophen (NORCO) 5-325 mg per tablet 1 Tab  1 Tab Oral Q6H PRN    ondansetron (ZOFRAN) injection 4 mg  4 mg IntraVENous Q4H PRN    prochlorperazine (COMPAZINE) with saline injection 10 mg  10 mg IntraVENous Q6H PRN    diphenhydrAMINE (BENADRYL) capsule 25 mg  25 mg Oral Q6H PRN    central line flush (saline) syringe 10 mL  10 mL InterCATHeter PRN    heparin (porcine) pf 300 Units  300 Units InterCATHeter PRN         ASSESSMENT:    Problem List  Date Reviewed: 12/8/2020          Codes Class Noted    Febrile neutropenia ([facility] ICD-10-CM: D70.9, R50.81  ICD-9-CM: 288.00, 780.61  1/18/2021        History of autologous stem cell transplant ([facility] ICD-10-CM: U32.73  ICD-9-CM: V42.82  1/18/2021        Neutropenic fever ([facility] ICD-10-CM: D70.9, R50.81  ICD-9-CM: 288.00, 780.61  1/8/2021        Multiple myeloma not having achieved remission ([facility] ICD-10-CM: C90.00  ICD-9-CM: 203.00  11/24/2020        Essential hypertension ICD-10-CM: I10  ICD-9-CM: 401.9  3/29/2018        Hyperlipidemia ICD-10-CM: E78.5  ICD-9-CM: 272.4  3/29/2018        Left hip pain ICD-10-CM: M25.552  ICD-9-CM: 719.45  3/29/2018        GERD (gastroesophageal reflux disease) ICD-10-CM: K21.9  ICD-9-CM: 530.81  3/29/2018            Ms. Qing Stringer is a 56 y.o. female admitted on 1/18/2021 with a primary diagnosis of fever. She has Multiple Myeloma, s/p Auto PBHCT Day +19 with febrile illness. She was recently discharged on 1/17/2021 after admission for neutropenic fever. She was admitted for neutropenic fever on Day +9.  Cultures NG.  CXR neg.  Cdiff neg.  CMV, aspergillus, and fungitell neg.  She was treated with Cef/Vanc. Phil Mckeon developed RLE edema and doppler revealed new partially occlusive thrombus in R popliteal vein and R peroneal veins.  On Eliquis.  She had a fever on 1/16 and blood cultures were repeated. She has since been afebrile until she returned for f/u in clinic on 1/18 and reported fever 101 at home. BCx 1/16- NGTD. She had a CXR on 1/16 that showed minimal right lobe infiltrate--encouraged to use IS at home. Phil Mckeon will continued on her prophylactic Acyclovir and Levaquin 500 daily x 1 week. PLAN:  Multiple myeloma  - s/p Auto PBSCT  1/20 Day +21    Fever  · IV ABX and follow-up cultures  · Infectious Disease Consult  · CT scan chest/abdomen/pelvis  · Echocardiogram  1/19 UA neg. CXR with near total resolution of R basilar infiltrate. COVID rapid neg, PCR pending. Cultures pending. CMV, fungitell, and aspergillus pending. Pt with +Overgaard sign. CT CAP pending. Echo pending. ID consulted. Con't Cef/Vanc (D2).  1/20 Remains afebrile. Previous fever likely r/t Cdiff infx. COVID PCR neg. UCx-NG. BCx-NGTD. Echo with no vegetation. CT CAP with multiple wedge-shaped areas of hypoenhancement involving the both kidneys - favored to represent multifocal pyelonephritis - low suspicion of this. ID following. DC'd Cef/ IV Vanc. Con't oral Vanc and ppx ACV. Diarrhea / Cdiff  1/19 Cdiff pending.   If neg, can utilize antidiarrheals. 1/20 CDiff positive. Started oral vanc yesterday. Add flagyl per Dr. Ernesto Waters. Hypokalemia  - Replete prn per [facility] SOPs  1/20 K+ 3.1 - replete. Pancytopenia secondary to chemotherapy  - Transfuse prn per [facility] SOPs  1/20 ANC down to 1000. Granix ordered. Hx RLE DVT  - on Eliquis    Continue home meds  Prophylactic antibx: Acyclovir  [facility] SOPs  VTE ppx: on Eliquis (hold for plt <50k)    Goals and plan of care reviewed with the patient. All questions answered to the best of our ability. Disposition:  Plan for discharge home tomorrow if remains afebrile. She will need to f/u with Dr. Gem Wade in one week. Ari Edge, ROSANGELA   [facility] Hematology & Oncology  [id number] [place] [address]  Office : [phone]  Fax : [phone]   I personally saw, exammed and counselled the patient, and discussed with NP, agree with above history/assessment/plan. 56 y. o.female MM s/p ASCT day 20 admitted again with fever persistent on levaquine, CXR previously showed infiltrates improved, with diarrhea, c-diff positive and start oral vanc, persistent diarrhea and add flagyl, replete K, pRBC as needed.       Sony Duran M.D.   [facility]  [address] [place] [address]  Office : [phone]  Fax : [phone]

## 2021-01-20 NOTE — PROGRESS NOTES
Infectious Disease Consult    Today's Date: 2021   Admit Date: 2021    Impression:   · Multiple myeloma s/p peripheral blood HCT on 20; engrafted 2021  · + C diff colitis  · DVT    I do not think that she has pyelonephritis    Plan:   · Continue po vancomycin 125 mg po q6 hours x 10 days  · I don't think that she needs acyclovir at this point post engraftment, but will defer to oncology team  · From my perspective she can be discharged. I will sign off. Call back for any concerns. Anti-infectives:   · Acyclovir  · Po vancomycin    Subjective:   She is still having frequent diarrhea and has had 3 episodes over the past 3 hours. Afebrile. Review of Systems:  A comprehensive review of systems was negative except for that written in the History of Present Illness. Objective:     Visit Vitals  BP (!) 146/76 (BP 1 Location: Right arm, BP Patient Position: At rest;Supine; Head of bed elevated (Comment degrees))   Pulse 72   Temp 98.2 °F (36.8 °C)   Resp 22   Wt 64.6 kg (142 lb 6.4 oz)   SpO2 95%   Breastfeeding No   BMI 27.81 kg/m²     Temp (24hrs), Av.6 °F (37 °C), Min:98.2 °F (36.8 °C), Max:98.9 °F (37.2 °C)    Physical exam unchanged from yesterday     Lines:  Peripheral IV:       Physical Exam:    General:  Alert, cooperative, well nourished, well developed, appears stated age; lying comfortably in bed   Eyes:  Sclera anicteric. Pupils equally round and reactive to light. Mouth/Throat: Mucous membranes normal, oral pharynx clear   Neck: Supple   Lungs:   Clear to auscultation bilaterally, good effort   CV:  Regular rate and rhythm,no murmur, click, rub or gallop   Abdomen:   Soft, non-tender.  bowel sounds normal. non-distended   Extremities: No cyanosis or edema   Skin: Skin color, texture, turgor normal. no acute rash or lesions   Lymph nodes: Cervical and supraclavicular normal   Musculoskeletal: No swelling or deformity   Lines/Devices:  Intact, no erythema, drainage or tenderness   Psych: Alert and oriented, normal mood affect given the setting       Data Review:     CBC:  Recent Labs     01/20/21 0337 01/19/21  0248 01/18/21  0835   WBC 3.3* 3.2* 5.0   GRANS 31* 49 77   MONOS 17* 17* 11   EOS 2 2 1   ANEU 1.0* 1.6* 3.8   ABL 1.6 1.0 0.5   HGB 7.4* 7.6* 9.2*   HCT 22.6* 23.1* 28.3*   * 95* 103*       BMP:  Recent Labs     01/20/21  0337 01/19/21  1120 01/19/21  0248 01/18/21  0835 01/18/21  0835   CREA 0.52*  --  0.60  --  0.80   BUN 7*  --  8  --  10     --  144  --  140   K 3.1* 4.0 3.4*   < > 2.9*   *  --  115*  --  108*   CO2 23  --  23  --  23   AGAP 6*  --  6*  --  9   GLU 87  --  89  --  133*    < > = values in this interval not displayed. LFTS:  Recent Labs     01/20/21 0337 01/19/21  0248 01/18/21  0835   TBILI 0.2 0.2 0.3   ALT 90* 92* 110*   * 151* 172*   TP 5.1* 4.8* 5.7*   ALB 2.1* 2.0* 2.3*       Microbiology:   CMV (1/11/2021) negative    All Micro Results     Procedure Component Value Units Date/Time    C. DIFFICILE AG & TOXIN A/B [[id number]]  (Abnormal) Collected: 01/19/21 0507    Order Status: Completed Specimen: Stool Updated: 01/19/21 1223     23[address] Philip Wolbach ANTIGEN       C. DIFFICILE GDH ANTIGEN-POSITIVE           C. difficile toxin       C. DIFFICILE TOXIN-POSITIVE           PCR Reflex NOT APPLICABLE        INTERPRETATION       POSITIVE FOR TOXIGENIC C. DIFFICILE           Comment: RESULTS VERIFIED, PHONED TO AND READ BACK BY  Maria M Adams RN ON 1/19/21 @1221, TA          Clinical Consideration       POSITIVE FOR TOXIGENIC C. DIFFICILE          CLOSTRIDIUM DIFF TOXIN A & B [[id number]] Collected: 01/19/21 0507    Order Status: Canceled Specimen: Stool     CMV BY PCR, QT [[id number]] Collected: 01/19/21 0248    Order Status: Completed Specimen: Blood Updated: 01/19/21 0310    CMV BY PCR, QT [[id number]]     Order Status: Canceled Specimen: Blood           Imaging:   CT chest/abd/pelvis  IMPRESSION  Chest:  1.  Trace pleural pleural effusions with bibasilar dependent and right middle  lobe linear opacities favored to represent atelectasis.     Abdomen and pelvis:  1. Multiple wedge-shaped areas of hypoenhancement involving both kidneys. In the  setting of fever this is favored to represent multifocal pyelonephritis. No  perinephric abscess. 2. Cholelithiasis without acute cholecystitis. 3. Nonspecific hypodense splenic lesion remains unchanged. 4. Unchanged lytic lesion in the left posterior aspect of the T12 vertebral  body, possibly related to adjacent costovertebral degenerative changes, though  indeterminate. CXR (1/18/2021)  IMPRESSION:  IMPRESSION: Near total resolution of the right basilar infiltrate. Duplex venous ultrasound (1/11/2021)  IMPRESSION:  New partially occlusive thrombus in the right popliteal vein and right peroneal veins.     Signed By: Denise Mann MD     January 20, 2021

## 2021-01-20 NOTE — PROGRESS NOTES
END OF SHIFT NOTE:  Day  + 21  s/p PBSCT   Patient had frequent loose stool this am, but after- noon no more loose stools noted. Fair po intake Potasium rechecked level 3.7. after Potassium bolus given. Patient medicated once with Zofran 4 mg IV for complain of nausea, no emesis produced. Low grade temperture 99.3 oral.   Intake/Output  01/20 0701 - 01/20 1900  In: -   Out: 2200 [Urine:1900]   Voiding: Yes   Catheter: No   Drain:              Stool: This am total of six stools per patient occurrences. Stool Assessment  Stool Color: Brown (01/20/21 1017)  Stool Appearance: Loose(oatmeal consistency x 6 ) (01/20/21 1017)  Stool Amount: Large (01/20/21 1017)  Stool Source/Status: Rectum (01/20/21 1017)    Emesis:  No  occurrences. VITAL SIGNS  Patient Vitals for the past 12 hrs:   Temp Pulse Resp BP SpO2   01/20/21 1808 99.3 °F (37.4 °C)       01/20/21 1547 98.6 °F (37 °C) 83 20 (!) 148/70 95 %   01/20/21 1130 98.3 °F (36.8 °C) 84 16 (!) 147/91 95 %   01/20/21 0838 98.2 °F (36.8 °C) 72 22 (!) 146/76 95 %       Pain Assessment  Pain 1  Pain Scale 1: Numeric (0 - 10) (01/20/21 1428)  Pain Intensity 1: 0 (01/20/21 1428)  Patient Stated Pain Goal: 0 (01/20/21 0250)  Pain Reassessment 1: Patient resting w/respiratory rate greater than 10 (01/20/21 0250)  Pain Onset 1: ongoing (01/20/21 0150)  Pain Location 1: Back (01/20/21 0150)  Pain Orientation 1: Lower (01/20/21 0150)  Pain Description 1: Aching (01/20/21 0150)  Pain Intervention(s) 1: Medication (see MAR) (01/20/21 0150)    Ambulating  Yes in room - recliner - bathroom     Additional Information:  See above     Shift report given to oncoming nurse MaryRN   at the bedside.     Renee Gold RN

## 2021-01-21 VITALS
SYSTOLIC BLOOD PRESSURE: 146 MMHG | HEIGHT: 60 IN | OXYGEN SATURATION: 96 % | TEMPERATURE: 98.6 F | WEIGHT: 134.8 LBS | DIASTOLIC BLOOD PRESSURE: 76 MMHG | BODY MASS INDEX: 26.46 KG/M2 | RESPIRATION RATE: 18 BRPM | HEART RATE: 96 BPM

## 2021-01-21 LAB
1,3 BETA GLUCAN SER-MCNC: <31 PG/ML
ALBUMIN SERPL-MCNC: 2.3 G/DL (ref 3.2–4.6)
ALBUMIN/GLOB SERPL: 0.8 {RATIO} (ref 1.2–3.5)
ALP SERPL-CCNC: 173 U/L (ref 50–136)
ALT SERPL-CCNC: 49 U/L (ref 12–65)
ANION GAP SERPL CALC-SCNC: 5 MMOL/L (ref 7–16)
AST SERPL-CCNC: 67 U/L (ref 15–37)
BACTERIA SPEC CULT: NORMAL
BACTERIA SPEC CULT: NORMAL
BASOPHILS # BLD: 0.1 K/UL (ref 0–0.2)
BASOPHILS NFR BLD: 0 % (ref 0–2)
BILIRUB SERPL-MCNC: 0.3 MG/DL (ref 0.2–1.1)
BUN SERPL-MCNC: 6 MG/DL (ref 8–23)
CALCIUM SERPL-MCNC: 7.6 MG/DL (ref 8.3–10.4)
CHLORIDE SERPL-SCNC: 115 MMOL/L (ref 98–107)
CO2 SERPL-SCNC: 25 MMOL/L (ref 21–32)
CREAT SERPL-MCNC: 0.44 MG/DL (ref 0.6–1)
DIFFERENTIAL METHOD BLD: ABNORMAL
EOSINOPHIL # BLD: 0.1 K/UL (ref 0–0.8)
EOSINOPHIL NFR BLD: 0 % (ref 0.5–7.8)
ERYTHROCYTE [DISTWIDTH] IN BLOOD BY AUTOMATED COUNT: 14.4 % (ref 11.9–14.6)
GLOBULIN SER CALC-MCNC: 2.8 G/DL (ref 2.3–3.5)
GLUCOSE SERPL-MCNC: 84 MG/DL (ref 65–100)
HCT VFR BLD AUTO: 25 % (ref 35.8–46.3)
HGB BLD-MCNC: 8 G/DL (ref 11.7–15.4)
IMM GRANULOCYTES # BLD AUTO: 0.6 K/UL (ref 0–0.5)
IMM GRANULOCYTES NFR BLD AUTO: 3 % (ref 0–5)
LYMPHOCYTES # BLD: 2.8 K/UL (ref 0.5–4.6)
LYMPHOCYTES NFR BLD: 12 % (ref 13–44)
MAGNESIUM SERPL-MCNC: 1.7 MG/DL (ref 1.8–2.4)
MCH RBC QN AUTO: 30.3 PG (ref 26.1–32.9)
MCHC RBC AUTO-ENTMCNC: 32 G/DL (ref 31.4–35)
MCV RBC AUTO: 94.7 FL (ref 79.6–97.8)
MONOCYTES # BLD: 2.1 K/UL (ref 0.1–1.3)
MONOCYTES NFR BLD: 9 % (ref 4–12)
MTHFR GENE MUT ANL BLD/T: NORMAL
NEUTS SEG # BLD: 17.9 K/UL (ref 1.7–8.2)
NEUTS SEG NFR BLD: 76 % (ref 43–78)
NRBC # BLD: 0 K/UL (ref 0–0.2)
PLATELET # BLD AUTO: 149 K/UL (ref 150–450)
PMV BLD AUTO: 11.2 FL (ref 9.4–12.3)
POTASSIUM SERPL-SCNC: 3.5 MMOL/L (ref 3.5–5.1)
PROT SERPL-MCNC: 5.1 G/DL (ref 6.3–8.2)
RBC # BLD AUTO: 2.64 M/UL (ref 4.05–5.2)
SERVICE CMNT-IMP: NORMAL
SERVICE CMNT-IMP: NORMAL
SODIUM SERPL-SCNC: 145 MMOL/L (ref 136–145)
WBC # BLD AUTO: 23.5 K/UL (ref 4.3–11.1)

## 2021-01-21 PROCEDURE — APPSS60 APP SPLIT SHARED TIME 46-60 MINUTES: Performed by: NURSE PRACTITIONER

## 2021-01-21 PROCEDURE — 36591 DRAW BLOOD OFF VENOUS DEVICE: CPT

## 2021-01-21 PROCEDURE — 74011250637 HC RX REV CODE- 250/637: Performed by: NURSE PRACTITIONER

## 2021-01-21 PROCEDURE — 74011000250 HC RX REV CODE- 250: Performed by: NURSE PRACTITIONER

## 2021-01-21 PROCEDURE — 74011250636 HC RX REV CODE- 250/636: Performed by: NURSE PRACTITIONER

## 2021-01-21 PROCEDURE — 80053 COMPREHEN METABOLIC PANEL: CPT

## 2021-01-21 PROCEDURE — 74011250637 HC RX REV CODE- 250/637: Performed by: INTERNAL MEDICINE

## 2021-01-21 PROCEDURE — 99239 HOSP IP/OBS DSCHRG MGMT >30: CPT | Performed by: INTERNAL MEDICINE

## 2021-01-21 PROCEDURE — 85025 COMPLETE CBC W/AUTO DIFF WBC: CPT

## 2021-01-21 PROCEDURE — 74011250636 HC RX REV CODE- 250/636: Performed by: INTERNAL MEDICINE

## 2021-01-21 PROCEDURE — 83735 ASSAY OF MAGNESIUM: CPT

## 2021-01-21 PROCEDURE — APPNB15 APP NON BILLABLE TIME 0-15 MINS: Performed by: NURSE PRACTITIONER

## 2021-01-21 PROCEDURE — 2709999900 HC NON-CHARGEABLE SUPPLY

## 2021-01-21 RX ORDER — MAGNESIUM SULFATE HEPTAHYDRATE 40 MG/ML
2 INJECTION, SOLUTION INTRAVENOUS ONCE
Status: COMPLETED | OUTPATIENT
Start: 2021-01-21 | End: 2021-01-21

## 2021-01-21 RX ORDER — METRONIDAZOLE 500 MG/1
500 TABLET ORAL 3 TIMES DAILY
Qty: 27 TAB | Refills: 0 | Status: SHIPPED | OUTPATIENT
Start: 2021-01-21 | End: 2021-01-21 | Stop reason: SDUPTHER

## 2021-01-21 RX ADMIN — POTASSIUM CHLORIDE AND SODIUM CHLORIDE: 900; 150 INJECTION, SOLUTION INTRAVENOUS at 07:39

## 2021-01-21 RX ADMIN — METRONIDAZOLE 500 MG: 500 TABLET, FILM COATED ORAL at 08:49

## 2021-01-21 RX ADMIN — ACYCLOVIR 400 MG: 800 TABLET ORAL at 08:56

## 2021-01-21 RX ADMIN — VANCOMYCIN HYDROCHLORIDE 125 MG: 5 INJECTION, POWDER, LYOPHILIZED, FOR SOLUTION INTRAVENOUS at 09:00

## 2021-01-21 RX ADMIN — APIXABAN 10 MG: 5 TABLET, FILM COATED ORAL at 08:49

## 2021-01-21 RX ADMIN — VANCOMYCIN HYDROCHLORIDE 125 MG: 5 INJECTION, POWDER, LYOPHILIZED, FOR SOLUTION INTRAVENOUS at 03:40

## 2021-01-21 RX ADMIN — TRAMADOL HYDROCHLORIDE 50 MG: 50 TABLET ORAL at 00:27

## 2021-01-21 RX ADMIN — Medication 300 UNITS: at 12:45

## 2021-01-21 RX ADMIN — ONDANSETRON 4 MG: 2 INJECTION INTRAMUSCULAR; INTRAVENOUS at 00:28

## 2021-01-21 RX ADMIN — MAGNESIUM SULFATE HEPTAHYDRATE 2 G: 40 INJECTION, SOLUTION INTRAVENOUS at 08:52

## 2021-01-21 RX ADMIN — PANTOPRAZOLE SODIUM 40 MG: 40 TABLET, DELAYED RELEASE ORAL at 07:38

## 2021-01-21 RX ADMIN — LISINOPRIL 10 MG: 5 TABLET ORAL at 08:50

## 2021-01-21 NOTE — PROGRESS NOTES
Problem: Falls - Risk of  Goal: *Absence of Falls  Description: Document Earma Addison Fall Risk and appropriate interventions in the flowsheet. Outcome: Resolved/Met  Note: Fall Risk Interventions:  Mobility Interventions: Communicate number of staff needed for ambulation/transfer         Medication Interventions: Teach patient to arise slowly    Elimination Interventions: Call light in reach              Problem: Risk for Spread of Infection  Goal: Prevent transmission of infectious organism to others  Description: Prevent the transmission of infectious organisms to other patients, staff members, and visitors. Outcome: Resolved/Met     Problem: Diarrhea (Adult and Pediatrics)  Goal: *Absence of diarrhea  Outcome: Resolved/Met  Goal: *PALLIATIVE CARE:  Absence of diarrhea  Outcome: Resolved/Met     Problem: Patient Education: Go to Patient Education Activity  Goal: Patient/Family Education  Outcome: Resolved/Met     Problem: Neutropenic Fever: Day 1  Goal: Off Pathway (Use only if patient is Off Pathway)  Outcome: Resolved/Met  Goal: Activity/Safety  Outcome: Resolved/Met  Goal: Consults, if ordered  Outcome: Resolved/Met  Goal: Diagnostic Test/Procedures  Outcome: Resolved/Met  Goal: Nutrition/Diet  Outcome: Resolved/Met  Goal: Discharge Planning  Outcome: Resolved/Met  Goal: Medications  Outcome: Resolved/Met  Goal: Respiratory  Outcome: Resolved/Met  Goal: Treatments/Interventions/Procedures  Outcome: Resolved/Met  Goal: Psychosocial  Outcome: Resolved/Met  Goal: *Optimal pain control at patient's stated goal  Outcome: Resolved/Met  Goal: *Hemodynamically stable  Outcome: Resolved/Met  Goal: *Adequate oxygenation  Outcome: Resolved/Met     Problem:  Body Temperature -  Risk of, Imbalanced  Goal: *Absence of heat stress or hyperthermia signs and symptoms  Outcome: Resolved/Met     Problem: Patient Education: Go to Patient Education Activity  Goal: Patient/Family Education  Outcome: Resolved/Met     Problem: Pain  Goal: *Control of Pain  Outcome: Resolved/Met     Problem: Patient Education: Go to Patient Education Activity  Goal: Patient/Family Education  Outcome: Resolved/Met     Problem: Nausea/Vomiting (Adult)  Goal: *Absence of nausea/vomiting  Outcome: Resolved/Met     Problem: Patient Education: Go to Patient Education Activity  Goal: Patient/Family Education  Outcome: Resolved/Met

## 2021-01-21 NOTE — PROGRESS NOTES
Patient discharge via wheelchair accompany by this nurse to family's vehicle for ride home . ancelmo's  present.

## 2021-01-21 NOTE — DISCHARGE SUMMARY
[facility] Hematology & Oncology: Inpatient Hematology / Oncology Discharge Summary Note    Patient ID:  Debbie Loving  [id number]  89 y.o.  1960    Admit Date: 1/18/2021    Discharge Date: 1/21/2021    Admission Diagnoses: Febrile [facility]rop[facility] ([facility] [D70.9, R50.81]  History of autologous stem cell transplant [facility]) [Z94.84]    Discharge Diagnoses:  Principal Diagnosis: <principal problem not specified>  Active Problems:    Febrile neutropenia ([facility] (1/18/2021)      History of autologous stem cell transplant ([facility] (1/18/2021)        Hospital Course:  Ms. Arrieta is a 56 y. o. female admitted on 1/18/2021 with a primary diagnosis of fever.  She has Multiple Myeloma, s/p Auto PBHCT, admitted on Day +19 with febrile illness. She was recently discharged on 1/17/2021 after admission for neutropenic fever. She was admitted for neutropenic fever on Day +9.  Cultures NG.  CXR neg.  Cdiff neg.  CMV, aspergillus, and fungitell neg.  She was treated with Cef/Vanc. Crystal Hawkins developed RLE edema and doppler revealed new partially occlusive thrombus in R popliteal vein and R peroneal veins.  On Eliquis.  She had a fever on 1/16 and blood cultures were repeated and negative. She had a CXR on 1/16 that showed minimal right lobe infiltrate--encouraged to use IS at home.   She was discharged the next day, 1/17 with script for LVQ.  She returned for f/u in clinic on 1/18 and reported fever 101 at home.      Cdiff positive. On oral Vanc/flagyl. CT CAP with multiple wedge-shaped areas on b/l kidneys, suspicious for pyelonephritis. Low suspicion for pyelo. UCx-NG. BCx-NGTD. Echo with no veg.  CMV neg. Aspergillus and fungitell pending. She was given Granix x 1 yesterday for declining ANC. ANC up to 17.9 today. She has remained afebrile and is feeling better. States stools are turning \"pastey\". She will continue oral vanc/flagyl upon discharge. Advised [facility] Instructed pt to call immediately if unable to obtain script. She will receive Mag bolus prior to discharge. Discussed pt with Dr. Manuela Dimas. She will need to f/u in one week. Advised to call with fever, chills, uncontrollable symptoms, or with any other concerns. Multiple myeloma  - s/p Auto PBSCT  1/21 Day +22     Fever  · IV ABX and follow-up cultures  · Infectious Disease Consult  · CT scan chest/abdomen/pelvis  · Echocardiogram  1/19 UA neg. CXR with near total resolution of R basilar infiltrate. COVID rapid neg, PCR pending. Cultures pending. CMV, fungitell, and aspergillus pending. Pt with +Buchanan sign. CT CAP pending. Echo pending. ID consulted. Con't Cef/Vanc (D2).  1/20 Remains afebrile. Previous fever likely r/t Cdiff infx. COVID PCR neg. UCx-NG. BCx-NGTD. Echo with no vegetation. CT CAP with multiple wedge-shaped areas of hypoenhancement involving the both kidneys - favored to represent multifocal pyelonephritis - low suspicion of this. ID following. DC'd Cef/ IV Vanc. Con't oral Vanc and ppx ACV.       Diarrhea / Cdiff  1/19 Cdiff pending. If neg, can utilize antidiarrheals. 1/20 CDiff positive. Started oral vanc yesterday. Add flagyl per Dr. Griselda Dexter.     Hypokalemia  - Replete prn per [facility] SOPs  1/20 K+ 3.1 - replete.     Pancytopenia secondary to chemotherapy  - Transfuse prn per [facility] SOPs  1/20 ANC down to 1000.   Granix ordered.     Hx RLE DVT  - on Eliquis    Consults:  IP CONSULT TO INFECTIOUS DISEASES    Pertinent Diagnostic Studies:   Labs:    Recent Labs     01/21/21  0340 01/20/21  0337 01/19/21  0248   WBC 23.5* 3.3* 3.2*   HGB 8.0* 7.4* 7.6*   * 120* 95*   ANEU 17.9* 1.0* 1.6*      Recent Labs     01/21/21  0340 01/20/21  1013 01/20/21  0337 01/19/21  0248 01/19/21  0248     --  143  --  144   K 3.5 3.7 3.1*   < > 3.4*   *  --  114*  --  115*   CO2 25  --  23  --  23   GLU 84  --  87  --  89   BUN 6*  --  7*  --  8   CREA 0.44*  --  0.52*  --  0.60 CA 7.6*  --  7.5*  --  7.2*   *  --  163*  --  151*   TP 5.1*  --  5.1*  --  4.8*   ALB 2.3*  --  2.1*  --  2.0*   MG 1.7*  --  1.9  --  1.8    < > = values in this interval not displayed. Imaging:  CT CHEST ABD PELV W CONT [[id number]] Collected: 01/19/21 1348   Order Status: Completed Updated: 01/19/21 1423   Narrative:     EXAMINATION: CT CHEST, ABDOMEN, AND PELVIS 1/19/2021 1:13 PM     ACCESSION NUMBER: [id number]     INDICATION: persistent fevers s/p auto PBSCT     COMPARISON: Chest x-ray, 1/18/2021. PET/CT, 11/24/2020     TECHNIQUE: Multiple contiguous axial CT images of the chest, abdomen, and pelvis   were obtained from the thoracic inlet to the pubic symphysis after the   intravenous administration of 100 mL Isovue-370 contrast material . Oral   contrast was administered. Radiation dose reduction techniques were used for this study:  Our CT scanners   use one or all of the following: Automated exposure control, adjustment of the   mA and/or kVp according to patient's size, iterative reconstruction. FINDINGS:     CHEST:     LUNGS/PLEURA:   Central airways are patent. Trace bilateral pleural effusions. Right greater   than left basilar dependent opacities as well as predominantly linear opacities   in the right middle lobe, favored represent atelectasis. There are postsurgical   changes along the medial left apex. Triangular peripheral nodule in the lingula   measuring 4 mm is unchanged from prior     MEDIASTINUM:   The thyroid and thoracic esophagus are unremarkable. The heart is normal size   without pericardial effusion. A right IJ chemotherapy port terminates in the   right atrium. The great vessels are normal in caliber. No mediastinal or hilar   lymphadenopathy. BONES AND SOFT TISSUES:   Subcutaneous soft tissues are within normal limits. No acute or aggressive   osseous abnormality.        ABDOMEN/PELVIS:     LIVER: Subcentimeter hypodensity in segment 4 a peripherally is unchanged and   favored represent a small hemangioma or simple cyst.     BILIARY: Cholelithiasis without evidence of acute cholecystitis. No biliary duct   dilation. SPLEEN: When accounting for differences in contrast enhancement of the spleen,   there is a stable 1.3 cm rounded hypodense lesion in the lateral aspect of the   spleen. PANCREAS: No pancreatic duct dilation or mass. ADRENALS: No adrenal nodules or hypertrophy. URINARY: There are multiple wedge-shaped areas of hypoenhancement within both   kidneys, involving the right superior pole and left superior and inferior poles   no perinephric inflammatory stranding or abscess. No hydronephrosis. 1.2 cm   exophytic left inferior pole simple cyst. Ureters and urinary bladder are within   normal limits. BOWEL: The stomach, small bowel, and large bowel normal in caliber and wall   thickness. There is mild colonic diverticulosis without evidence of acute   diverticulitis. VESSELS: Abdominal aorta and iliac arterial system are nonaneurysmal with mild   atherosclerotic calcifications. NODES: No lymphadenopathy. FLUID: No free intraperitoneal fluid. REPRODUCTIVE: Bilateral tubal ligation clips. BONES/SOFT TISSUES: Regional soft tissues are unremarkable. Unchanged lytic   lesion in the left posterior aspect of the T12 vertebral body with surrounding   sclerotic changes, possibly related to costovertebral degenerative changes,   though indeterminate. Partially visualized right femoral cephalomedullary   fixation hardware. Impression:     Chest:   1. Trace pleural pleural effusions with bibasilar dependent and right middle   lobe linear opacities favored to represent atelectasis. Abdomen and pelvis:   1. Multiple wedge-shaped areas of hypoenhancement involving both kidneys. In the   setting of fever this is favored to represent multifocal pyelonephritis. No   perinephric abscess.    2. Cholelithiasis without acute cholecystitis. 3. Nonspecific hypodense splenic lesion remains unchanged. 4. Unchanged lytic lesion in the left posterior aspect of the T12 vertebral   body, possibly related to adjacent costovertebral degenerative changes, though   indeterminate. XR CHEST PA LAT [[id number]] Collected: 01/18/21 1526   Order Status: Completed Updated: 01/18/21 1529   Narrative:     CHEST X-RAY, 2 views. HISTORY:  Fever. TECHNIQUE: PA and lateral views. COMPARISON: Exam 2 days prior. FINDINGS:   Lungs: Infiltrate right base largely resolved. No new abnormality. Costophrenic angles: are sharp. Heart size: is normal.   Pulmonary vasculature: is unremarkable. Aorta: Unremarkable. Included portion of the upper abdomen: is unremarkable. Bones: No gross bony lesions. Other: Right-sided chest port is present    Impression:     IMPRESSION: Near total resolution of the right basilar infiltrate. Current Discharge Medication List      START taking these medications    Details   vancomycin 50 mg/mL oral solution (compounded) Take 2.5 mL by mouth every six (6) hours for 9 days. Qty: 90 mL, Refills: 0      metroNIDAZOLE (FLAGYL) 500 mg tablet Take 1 Tab by mouth three (3) times daily for 9 days. Qty: 27 Tab, Refills: 0         CONTINUE these medications which have NOT CHANGED    Details   apixaban (Eliquis DVT-PE Treat 30D Start) 5 mg (74 tabs) starter pack Take 10 mg (two 5 mg tablets) by mouth twice a day for 7 days Followed by 5 mg (one 5 mg tablet) by mouth twice a day  Qty: 1 Dose Pack, Refills: 0      LORazepam (ATIVAN) 1 mg tablet Take 1 Tab by mouth every four (4) hours as needed (nausea). Max Daily Amount: 6 mg. Indications: prevent nausea and vomiting from cancer chemotherapy  Qty: 30 Tab, Refills: 1    Associated Diagnoses: Chemotherapy-induced nausea      ondansetron hcl (ZOFRAN) 8 mg tablet Take 1 Tab by mouth every eight (8) hours as needed for Nausea or Vomiting.  Indications: nausea and vomiting caused by cancer drugs  Qty: 30 Tab, Refills: 5      prochlorperazine (COMPAZINE) 10 mg tablet Take 1 Tab by mouth every six (6) hours as needed for Nausea. Indications: nausea and vomiting caused by cancer drugs  Qty: 30 Tab, Refills: 2      acyclovir (ZOVIRAX) 400 mg tablet Take 1 Tab by mouth two (2) times a day. Qty: 60 Tab, Refills: 5      ergocalciferol (ERGOCALCIFEROL) 1,250 mcg (50,000 unit) capsule Take 1 Cap by mouth every seven (7) days. Qty: 4 Cap, Refills: 2      traMADoL (ULTRAM) 50 mg tablet TAKE 1 TABLET BY MOUTH TWICE DAILY AS NEEDED FOR PAIN      simvastatin (ZOCOR) 20 mg tablet Take 1 Tab by mouth nightly. Indications: high cholesterol and high triglycerides  Qty: 90 Tab, Refills: 4    Associated Diagnoses: Mixed hyperlipidemia      lisinopriL (PRINIVIL, ZESTRIL) 10 mg tablet Take 1 Tab by mouth daily. Indications: high blood pressure  Qty: 90 Tab, Refills: 4    Associated Diagnoses: Essential hypertension      fluticasone propionate (FLONASE) 50 mcg/actuation nasal spray 2 Sprays by Both Nostrils route daily. Qty: 1 Bottle, Refills: 2    Associated Diagnoses: Acute non-recurrent frontal sinusitis      esomeprazole (NEXIUM) 20 mg capsule Take 1 Cap by mouth daily.   Qty: 90 Cap, Refills: 4    Associated Diagnoses: Gastroesophageal reflux disease, esophagitis presence not specified         STOP taking these medications       gabapentin (NEURONTIN) 300 mg capsule Comments:   Reason for Stopping:               OBJECTIVE:  Patient Vitals for the past 8 hrs:   BP Temp Pulse Resp SpO2 Weight   21 0742      134 lb 12.8 oz (61.1 kg)   21 0732 (!) 155/79 98.8 °F (37.1 °C) 83 18 94 %    21 0415 (!) 152/75 99.2 °F (37.3 °C) 84 20 93 %      Temp (24hrs), Av.7 °F (37.1 °C), Min:98.3 °F (36.8 °C), Max:99.3 °F (37.4 °C)     0701 - 01/21 1900  In: 346 [I.V.:346]  Out: 900 [Urine:900]    Physical Exam:  Constitutional: Well developed, well nourished female in no acute distress, sitting comfortably on the bedside chair. HEENT: Normocephalic and atraumatic. Oropharynx is clear, mucous membranes are moist.  Extraocular muscles are intact. Sclerae anicteric. Neck supple without JVD. No thyromegaly present. Skin Warm and dry. No bruising and no rash noted. No erythema. No pallor. Respiratory Lungs are clear to auscultation bilaterally without wheezes, rales or rhonchi, normal air exchange without accessory muscle use. CVS Normal rate, regular rhythm and normal S1 and S2. No murmurs, gallops, or rubs. Abdomen Soft, nontender and nondistended, normoactive bowel sounds. No palpable mass. No hepatosplenomegaly. Neuro Grossly nonfocal with no obvious sensory or motor deficits. MSK Normal range of motion in general.  No edema and no tenderness. Psych Appropriate mood and affect. ASSESSMENT:    Active Problems:    Febrile neutropenia ([facility] (1/18/2021)      [facility])        DISPOSITION:  Follow-up Appointments   Procedures    FOLLOW UP VISIT Appointment in: One Week Please schedule f/u appt with Dr. Vernon Casillas in one week with labs prior (CBC, Mag, CMP)     Please schedule f/u appt with Dr. Vernon Casillas in one week with labs prior (CBC, Mag, CMP)     Standing Status:   Standing     Number of Occurrences:   1     Order Specific Question:   Appointment in     Answer: One Week       Over 30 minutes was spent in discharge planning and coordination of care.             Chrissy Martin NP  [facility] Hematology & Oncology  [address] [place] [address]  Office : [phone]  Fax : [phone]   I personally saw, exammed and counselled the patient, and discussed with NP, agree with above history/assessment/plan. 60 y.o. female MM s/p ASCT day 20 admitted again with fever persistent on levaquine, CXR previously showed infiltrates improved, with diarrhea, c-diff positive and start oral vanc, persistent diarrhea and add flagyl, olivia and vera repleted, dc as above arrange and schedule to follow in office.  Zohaib Walsh M.D.   [address]  Office : [phone]  Fax : [phone]

## 2021-01-21 NOTE — PROGRESS NOTES
Transplant Date 12/30/20  Auto High dose date  12/29/20  Labs not resulted that need follow-up:  none  Next appointment scheduled: pending  BMT assessments and toxicities completed: yes  WBC/ANC= 23.5/17.9  Prophylactic medications started 12/28 and stopped N/A  Granix started on 1/20 and stopped N/A  Toxicities greater than 2:  Bactrim or equivalent initiated on Rationale for delayed initiation   Patient seen by MD/NP  until engraftment. Issues requiring new orders N/A   Oral care performed onetimes this shift. Shift report given to Cassi Schwartz.

## 2021-01-22 ENCOUNTER — HOSPITAL ENCOUNTER (OUTPATIENT)
Dept: INFUSION THERAPY | Age: 61
Discharge: HOME | End: 2021-01-22

## 2021-01-22 ENCOUNTER — HOSPITAL ENCOUNTER (OUTPATIENT)
Dept: INFUSION THERAPY | Age: 61
End: 2021-01-22

## 2021-01-22 LAB — GALACTOMANNAN AG SPEC IA-ACNC: 0.03 INDEX (ref 0–0.49)

## 2021-01-23 LAB
BACTERIA SPEC CULT: NORMAL
BACTERIA SPEC CULT: NORMAL
SERVICE CMNT-IMP: NORMAL
SERVICE CMNT-IMP: NORMAL

## 2021-01-25 ENCOUNTER — PATIENT OUTREACH (OUTPATIENT)
Dept: CASE MANAGEMENT | Age: 61
End: 2021-01-25

## 2021-01-25 ENCOUNTER — HOSPITAL ENCOUNTER (OUTPATIENT)
Dept: INFUSION THERAPY | Age: 61
Discharge: HOME | End: 2021-01-25
Payer: COMMERCIAL

## 2021-01-25 DIAGNOSIS — C90.00 MULTIPLE MYELOMA NOT HAVING ACHIEVED REMISSION (HCC): Primary | ICD-10-CM

## 2021-01-25 LAB
ALBUMIN SERPL-MCNC: 3 G/DL (ref 3.2–4.6)
ALBUMIN/GLOB SERPL: 0.9 {RATIO} (ref 1.2–3.5)
ALP SERPL-CCNC: 154 U/L (ref 50–136)
ALT SERPL-CCNC: 108 U/L (ref 12–65)
ANION GAP SERPL CALC-SCNC: 8 MMOL/L (ref 7–16)
AST SERPL-CCNC: 63 U/L (ref 15–37)
BASOPHILS # BLD: 0.1 K/UL (ref 0–0.2)
BASOPHILS NFR BLD: 1 % (ref 0–2)
BILIRUB SERPL-MCNC: 0.3 MG/DL (ref 0.2–1.1)
BUN SERPL-MCNC: 12 MG/DL (ref 8–23)
CALCIUM SERPL-MCNC: 9.1 MG/DL (ref 8.3–10.4)
CHLORIDE SERPL-SCNC: 109 MMOL/L (ref 98–107)
CO2 SERPL-SCNC: 25 MMOL/L (ref 21–32)
CREAT SERPL-MCNC: 0.7 MG/DL (ref 0.6–1)
DIFFERENTIAL METHOD BLD: ABNORMAL
EOSINOPHIL # BLD: 0.2 K/UL (ref 0–0.8)
EOSINOPHIL NFR BLD: 2 % (ref 0.5–7.8)
ERYTHROCYTE [DISTWIDTH] IN BLOOD BY AUTOMATED COUNT: 15.5 % (ref 11.9–14.6)
GLOBULIN SER CALC-MCNC: 3.3 G/DL (ref 2.3–3.5)
GLUCOSE SERPL-MCNC: 106 MG/DL (ref 65–100)
HCT VFR BLD AUTO: 27.3 % (ref 35.8–46.3)
HGB BLD-MCNC: 8.6 G/DL (ref 11.7–15.4)
IMM GRANULOCYTES # BLD AUTO: 0.8 K/UL (ref 0–0.5)
IMM GRANULOCYTES NFR BLD AUTO: 9 % (ref 0–5)
LYMPHOCYTES # BLD: 1.4 K/UL (ref 0.5–4.6)
LYMPHOCYTES NFR BLD: 16 % (ref 13–44)
MAGNESIUM SERPL-MCNC: 1.8 MG/DL (ref 1.8–2.4)
MCH RBC QN AUTO: 30.7 PG (ref 26.1–32.9)
MCHC RBC AUTO-ENTMCNC: 31.5 G/DL (ref 31.4–35)
MCV RBC AUTO: 97.5 FL (ref 79.6–97.8)
MONOCYTES # BLD: 2.5 K/UL (ref 0.1–1.3)
MONOCYTES NFR BLD: 28 % (ref 4–12)
NEUTS SEG # BLD: 3.9 K/UL (ref 1.7–8.2)
NEUTS SEG NFR BLD: 44 % (ref 43–78)
NRBC # BLD: 0 K/UL (ref 0–0.2)
PHOSPHATE SERPL-MCNC: 3.4 MG/DL (ref 2.3–3.7)
PLATELET # BLD AUTO: 215 K/UL (ref 150–450)
PLATELET COMMENTS,PCOM: ADEQUATE
PMV BLD AUTO: 10.3 FL (ref 9.4–12.3)
POTASSIUM SERPL-SCNC: 3.5 MMOL/L (ref 3.5–5.1)
PROT SERPL-MCNC: 6.3 G/DL (ref 6.3–8.2)
RBC # BLD AUTO: 2.8 M/UL (ref 4.05–5.25)
RBC MORPH BLD: ABNORMAL
SODIUM SERPL-SCNC: 142 MMOL/L (ref 136–145)
WBC # BLD AUTO: 8.9 K/UL (ref 4.3–11.1)
WBC MORPH BLD: ABNORMAL

## 2021-01-25 PROCEDURE — 96365 THER/PROPH/DIAG IV INF INIT: CPT

## 2021-01-25 PROCEDURE — 84100 ASSAY OF PHOSPHORUS: CPT

## 2021-01-25 PROCEDURE — 80053 COMPREHEN METABOLIC PANEL: CPT

## 2021-01-25 PROCEDURE — 85025 COMPLETE CBC W/AUTO DIFF WBC: CPT

## 2021-01-25 PROCEDURE — 83735 ASSAY OF MAGNESIUM: CPT

## 2021-01-25 PROCEDURE — 74011250636 HC RX REV CODE- 250/636: Performed by: INTERNAL MEDICINE

## 2021-01-25 RX ORDER — DEXTROSE, SODIUM CHLORIDE, AND POTASSIUM CHLORIDE 5; .45; .15 G/100ML; G/100ML; G/100ML
1000 INJECTION INTRAVENOUS
Status: COMPLETED | OUTPATIENT
Start: 2021-01-25 | End: 2021-01-25

## 2021-01-25 RX ORDER — SODIUM CHLORIDE 0.9 % (FLUSH) 0.9 %
10-40 SYRINGE (ML) INJECTION AS NEEDED
Status: DISCONTINUED | OUTPATIENT
Start: 2021-01-25 | End: 2021-01-26 | Stop reason: HOSPADM

## 2021-01-25 RX ORDER — MAGNESIUM SULFATE HEPTAHYDRATE 40 MG/ML
2 INJECTION, SOLUTION INTRAVENOUS ONCE
Status: COMPLETED | OUTPATIENT
Start: 2021-01-25 | End: 2021-01-25

## 2021-01-25 RX ADMIN — DEXTROSE, SODIUM CHLORIDE, AND POTASSIUM CHLORIDE 1000 ML: 5; .45; .15 INJECTION INTRAVENOUS at 10:31

## 2021-01-25 RX ADMIN — Medication 10 ML: at 12:37

## 2021-01-25 RX ADMIN — MAGNESIUM SULFATE HEPTAHYDRATE 2 G: 40 INJECTION, SOLUTION INTRAVENOUS at 10:31

## 2021-01-25 RX ADMIN — Medication 10 ML: at 10:30

## 2021-01-25 NOTE — PROGRESS NOTES
Diagnosis: Multiple Myeloma  Transplant Date: 12/30/20  Day: (+/-) +26  Chemo regimen used: Melphalan  Labs needed at next visit: See orders. Labs not resulted that need follow-up: None. Next Appointment scheduled: 1/29/21. BMT assessments and toxicities completed. WBC/ANC= 8.9/3.9  Prophylactic medications started Day +1 and stopped 1/11/21. Granix started on Day +6 and last dose given on 1/10/21. Toxicities greater than 2 : None. Bactrim or equivalent to be initiated on Day +30. Patient seen by MD/NP Dr Zulay Lopez until engraftment. New orders received: None. Issues: None  Patient received 1 liter D5 0.45%NS with 20 mEq KClover 2 hours and 2 grams IV Mg+ over 1 hour. Patient denies any diarrhea since discharge from hospital.  Discharged ambulatory in stable condition.

## 2021-01-25 NOTE — PROGRESS NOTES
1/25/21 - Patient here for follow up . Patient is D26 post auto. Patient will return on 1/29 and will be D30 at that time. Bactrim will likely NOT be started at this visit yet. Patient will complete Vacn and Flagyl for C. Diff and will also remain on Acyclovir as well. Will speak with FC about Eliquis help for next prescription.

## 2021-01-25 NOTE — PROGRESS NOTES
Problem: Risk for Spread of Infection  Goal: Prevent transmission of infectious organism to others  Outcome: Progressing Towards Goal

## 2021-01-27 ENCOUNTER — APPOINTMENT (OUTPATIENT)
Dept: INFUSION THERAPY | Age: 61
End: 2021-01-27

## 2021-01-27 ENCOUNTER — HOSPITAL ENCOUNTER (OUTPATIENT)
Dept: INFUSION THERAPY | Age: 61
Discharge: HOME | End: 2021-01-27

## 2021-01-29 ENCOUNTER — PATIENT OUTREACH (OUTPATIENT)
Dept: CASE MANAGEMENT | Age: 61
End: 2021-01-29

## 2021-01-29 ENCOUNTER — HOSPITAL ENCOUNTER (OUTPATIENT)
Dept: INFUSION THERAPY | Age: 61
Discharge: HOME | End: 2021-01-29
Payer: COMMERCIAL

## 2021-01-29 DIAGNOSIS — C90.00 MULTIPLE MYELOMA NOT HAVING ACHIEVED REMISSION (HCC): ICD-10-CM

## 2021-01-29 LAB
ALBUMIN SERPL-MCNC: 3.2 G/DL (ref 3.2–4.6)
ALBUMIN/GLOB SERPL: 1 {RATIO} (ref 1.2–3.5)
ALP SERPL-CCNC: 112 U/L (ref 50–136)
ALT SERPL-CCNC: 45 U/L (ref 12–65)
ANION GAP SERPL CALC-SCNC: 7 MMOL/L (ref 7–16)
AST SERPL-CCNC: 12 U/L (ref 15–37)
BASOPHILS # BLD: 0 K/UL (ref 0–0.2)
BASOPHILS NFR BLD: 1 % (ref 0–2)
BILIRUB SERPL-MCNC: 0.2 MG/DL (ref 0.2–1.1)
BUN SERPL-MCNC: 11 MG/DL (ref 8–23)
CALCIUM SERPL-MCNC: 8.7 MG/DL (ref 8.3–10.4)
CHLORIDE SERPL-SCNC: 109 MMOL/L (ref 98–107)
CO2 SERPL-SCNC: 26 MMOL/L (ref 21–32)
CREAT SERPL-MCNC: 0.7 MG/DL (ref 0.6–1)
DIFFERENTIAL METHOD BLD: ABNORMAL
EOSINOPHIL # BLD: 0.1 K/UL (ref 0–0.8)
EOSINOPHIL NFR BLD: 3 % (ref 0.5–7.8)
ERYTHROCYTE [DISTWIDTH] IN BLOOD BY AUTOMATED COUNT: 15.6 % (ref 11.9–14.6)
GGT SERPL-CCNC: 184 U/L (ref 5–55)
GLOBULIN SER CALC-MCNC: 3.2 G/DL (ref 2.3–3.5)
GLUCOSE SERPL-MCNC: 122 MG/DL (ref 65–100)
HCT VFR BLD AUTO: 34.1 % (ref 35.8–46.3)
HGB BLD-MCNC: 10.8 G/DL (ref 11.7–15.4)
IMM GRANULOCYTES # BLD AUTO: 0.1 K/UL (ref 0–0.5)
IMM GRANULOCYTES NFR BLD AUTO: 2 % (ref 0–5)
LDH SERPL L TO P-CCNC: 247 U/L (ref 110–210)
LYMPHOCYTES # BLD: 1.1 K/UL (ref 0.5–4.6)
LYMPHOCYTES NFR BLD: 21 % (ref 13–44)
MAGNESIUM SERPL-MCNC: 1.8 MG/DL (ref 1.8–2.4)
MCH RBC QN AUTO: 30.9 PG (ref 26.1–32.9)
MCHC RBC AUTO-ENTMCNC: 31.7 G/DL (ref 31.4–35)
MCV RBC AUTO: 97.4 FL (ref 79.6–97.8)
MONOCYTES # BLD: 0.8 K/UL (ref 0.1–1.3)
MONOCYTES NFR BLD: 15 % (ref 4–12)
NEUTS SEG # BLD: 3.1 K/UL (ref 1.7–8.2)
NEUTS SEG NFR BLD: 60 % (ref 43–78)
NRBC # BLD: 0 K/UL (ref 0–0.2)
PHOSPHATE SERPL-MCNC: 3.6 MG/DL (ref 2.3–3.7)
PLATELET # BLD AUTO: 277 K/UL (ref 150–450)
PMV BLD AUTO: 9.7 FL (ref 9.4–12.3)
POTASSIUM SERPL-SCNC: 3.8 MMOL/L (ref 3.5–5.1)
PROT SERPL-MCNC: 6.4 G/DL (ref 6.3–8.2)
RBC # BLD AUTO: 3.5 M/UL (ref 4.05–5.25)
SODIUM SERPL-SCNC: 142 MMOL/L (ref 136–145)
WBC # BLD AUTO: 5.2 K/UL (ref 4.3–11.1)

## 2021-01-29 PROCEDURE — 83615 LACTATE (LD) (LDH) ENZYME: CPT

## 2021-01-29 PROCEDURE — 85025 COMPLETE CBC W/AUTO DIFF WBC: CPT

## 2021-01-29 PROCEDURE — 80053 COMPREHEN METABOLIC PANEL: CPT

## 2021-01-29 PROCEDURE — 36591 DRAW BLOOD OFF VENOUS DEVICE: CPT

## 2021-01-29 PROCEDURE — 83735 ASSAY OF MAGNESIUM: CPT

## 2021-01-29 PROCEDURE — 84100 ASSAY OF PHOSPHORUS: CPT

## 2021-01-29 PROCEDURE — 82977 ASSAY OF GGT: CPT

## 2021-01-29 NOTE — PROGRESS NOTES
1/29/21- Patient seen in the office today. She is feeling well. Loose stools have slowed down. No other needs at this time. Will see her back next week. No fluids needed today.

## 2021-02-01 ENCOUNTER — HOSPITAL ENCOUNTER (OUTPATIENT)
Dept: INFUSION THERAPY | Age: 61
End: 2021-02-01

## 2021-02-05 ENCOUNTER — HOSPITAL ENCOUNTER (OUTPATIENT)
Dept: LAB | Age: 61
Discharge: HOME | End: 2021-02-05
Payer: COMMERCIAL

## 2021-02-05 ENCOUNTER — APPOINTMENT (OUTPATIENT)
Dept: INFUSION THERAPY | Age: 61
End: 2021-02-05

## 2021-02-05 ENCOUNTER — PATIENT OUTREACH (OUTPATIENT)
Dept: CASE MANAGEMENT | Age: 61
End: 2021-02-05

## 2021-02-05 DIAGNOSIS — C90.00 MULTIPLE MYELOMA NOT HAVING ACHIEVED REMISSION (HCC): ICD-10-CM

## 2021-02-05 LAB
ALBUMIN SERPL-MCNC: 3.3 G/DL (ref 3.2–4.6)
ALBUMIN/GLOB SERPL: 1 {RATIO} (ref 1.2–3.5)
ALP SERPL-CCNC: 89 U/L (ref 50–136)
ALT SERPL-CCNC: 35 U/L (ref 12–65)
ANION GAP SERPL CALC-SCNC: 5 MMOL/L (ref 7–16)
AST SERPL-CCNC: 27 U/L (ref 15–37)
BASOPHILS # BLD: 0 K/UL (ref 0–0.2)
BASOPHILS NFR BLD: 0 % (ref 0–2)
BILIRUB SERPL-MCNC: 0.3 MG/DL (ref 0.2–1.1)
BUN SERPL-MCNC: 13 MG/DL (ref 8–23)
CALCIUM SERPL-MCNC: 8.8 MG/DL (ref 8.3–10.4)
CHLORIDE SERPL-SCNC: 112 MMOL/L (ref 98–107)
CO2 SERPL-SCNC: 26 MMOL/L (ref 21–32)
CREAT SERPL-MCNC: 0.8 MG/DL (ref 0.6–1)
DIFFERENTIAL METHOD BLD: ABNORMAL
EOSINOPHIL # BLD: 0.3 K/UL (ref 0–0.8)
EOSINOPHIL NFR BLD: 4 % (ref 0.5–7.8)
ERYTHROCYTE [DISTWIDTH] IN BLOOD BY AUTOMATED COUNT: 15.6 % (ref 11.9–14.6)
GGT SERPL-CCNC: 115 U/L (ref 5–55)
GLOBULIN SER CALC-MCNC: 3.4 G/DL (ref 2.3–3.5)
GLUCOSE SERPL-MCNC: 78 MG/DL (ref 65–100)
HCT VFR BLD AUTO: 35.1 % (ref 35.8–46.3)
HGB BLD-MCNC: 11.1 G/DL (ref 11.7–15.4)
IMM GRANULOCYTES # BLD AUTO: 0 K/UL (ref 0–0.5)
IMM GRANULOCYTES NFR BLD AUTO: 0 % (ref 0–5)
LDH SERPL L TO P-CCNC: 205 U/L (ref 110–210)
LYMPHOCYTES # BLD: 0.9 K/UL (ref 0.5–4.6)
LYMPHOCYTES NFR BLD: 14 % (ref 13–44)
MAGNESIUM SERPL-MCNC: 1.9 MG/DL (ref 1.8–2.4)
MCH RBC QN AUTO: 31.4 PG (ref 26.1–32.9)
MCHC RBC AUTO-ENTMCNC: 31.6 G/DL (ref 31.4–35)
MCV RBC AUTO: 99.2 FL (ref 79.6–97.8)
MONOCYTES # BLD: 0.8 K/UL (ref 0.1–1.3)
MONOCYTES NFR BLD: 12 % (ref 4–12)
NEUTS SEG # BLD: 4.5 K/UL (ref 1.7–8.2)
NEUTS SEG NFR BLD: 69 % (ref 43–78)
NRBC # BLD: 0 K/UL (ref 0–0.2)
PHOSPHATE SERPL-MCNC: 3.4 MG/DL (ref 2.3–3.7)
PLATELET # BLD AUTO: 262 K/UL (ref 150–450)
PMV BLD AUTO: 9.5 FL (ref 9.4–12.3)
POTASSIUM SERPL-SCNC: 3.9 MMOL/L (ref 3.5–5.1)
PROT SERPL-MCNC: 6.7 G/DL (ref 6.3–8.2)
RBC # BLD AUTO: 3.54 M/UL (ref 4.05–5.25)
SODIUM SERPL-SCNC: 143 MMOL/L (ref 136–145)
WBC # BLD AUTO: 6.6 K/UL (ref 4.3–11.1)

## 2021-02-05 PROCEDURE — 85025 COMPLETE CBC W/AUTO DIFF WBC: CPT

## 2021-02-05 PROCEDURE — 36415 COLL VENOUS BLD VENIPUNCTURE: CPT

## 2021-02-05 PROCEDURE — 80053 COMPREHEN METABOLIC PANEL: CPT

## 2021-02-05 PROCEDURE — 84100 ASSAY OF PHOSPHORUS: CPT

## 2021-02-05 PROCEDURE — 83615 LACTATE (LD) (LDH) ENZYME: CPT

## 2021-02-05 PROCEDURE — 82977 ASSAY OF GGT: CPT

## 2021-02-05 PROCEDURE — 83735 ASSAY OF MAGNESIUM: CPT

## 2021-02-05 NOTE — PROGRESS NOTES
2/5/21- Patient seen in the office today. Feeling well. Day +37, appetite is still low but eating and drinking adequate amounts. Energy is low but feels like she gets a little better as time passes. Will cancel IVF today, labs are all good. We will see her back in the office in two weeks. No other needs at this time.

## 2021-02-08 ENCOUNTER — HOSPITAL ENCOUNTER (OUTPATIENT)
Dept: INFUSION THERAPY | Age: 61
End: 2021-02-08

## 2021-02-12 ENCOUNTER — APPOINTMENT (OUTPATIENT)
Dept: INFUSION THERAPY | Age: 61
End: 2021-02-12

## 2021-02-17 ENCOUNTER — APPOINTMENT (OUTPATIENT)
Dept: INFUSION THERAPY | Age: 61
End: 2021-02-17

## 2021-02-19 ENCOUNTER — PATIENT OUTREACH (OUTPATIENT)
Dept: CASE MANAGEMENT | Age: 61
End: 2021-02-19

## 2021-02-19 ENCOUNTER — HOSPITAL ENCOUNTER (OUTPATIENT)
Dept: INFUSION THERAPY | Age: 61
Discharge: HOME | End: 2021-02-19
Payer: COMMERCIAL

## 2021-02-19 DIAGNOSIS — C90.00 MULTIPLE MYELOMA NOT HAVING ACHIEVED REMISSION (HCC): ICD-10-CM

## 2021-02-19 DIAGNOSIS — Z94.81 S/P AUTOLOGOUS BONE MARROW TRANSPLANTATION (HCC): ICD-10-CM

## 2021-02-19 LAB
ALBUMIN SERPL-MCNC: 3.4 G/DL (ref 3.2–4.6)
ALBUMIN/GLOB SERPL: 1 {RATIO} (ref 1.2–3.5)
ALP SERPL-CCNC: 85 U/L (ref 50–136)
ALT SERPL-CCNC: 33 U/L (ref 12–65)
ANION GAP SERPL CALC-SCNC: 8 MMOL/L (ref 7–16)
AST SERPL-CCNC: 22 U/L (ref 15–37)
BASOPHILS # BLD: 0 K/UL (ref 0–0.2)
BASOPHILS NFR BLD: 0 % (ref 0–2)
BILIRUB SERPL-MCNC: 0.2 MG/DL (ref 0.2–1.1)
BUN SERPL-MCNC: 22 MG/DL (ref 8–23)
CALCIUM SERPL-MCNC: 8.8 MG/DL (ref 8.3–10.4)
CHLORIDE SERPL-SCNC: 109 MMOL/L (ref 98–107)
CO2 SERPL-SCNC: 25 MMOL/L (ref 21–32)
CREAT SERPL-MCNC: 0.9 MG/DL (ref 0.6–1)
DIFFERENTIAL METHOD BLD: ABNORMAL
EOSINOPHIL # BLD: 0.3 K/UL (ref 0–0.8)
EOSINOPHIL NFR BLD: 5 % (ref 0.5–7.8)
ERYTHROCYTE [DISTWIDTH] IN BLOOD BY AUTOMATED COUNT: 15.4 % (ref 11.9–14.6)
GGT SERPL-CCNC: 73 U/L (ref 5–55)
GLOBULIN SER CALC-MCNC: 3.4 G/DL (ref 2.3–3.5)
GLUCOSE SERPL-MCNC: 91 MG/DL (ref 65–100)
HCT VFR BLD AUTO: 34.6 % (ref 35.8–46.3)
HGB BLD-MCNC: 11.1 G/DL (ref 11.7–15.4)
IMM GRANULOCYTES # BLD AUTO: 0 K/UL (ref 0–0.5)
IMM GRANULOCYTES NFR BLD AUTO: 1 % (ref 0–5)
LDH SERPL L TO P-CCNC: 166 U/L (ref 110–210)
LYMPHOCYTES # BLD: 1.3 K/UL (ref 0.5–4.6)
LYMPHOCYTES NFR BLD: 21 % (ref 13–44)
MAGNESIUM SERPL-MCNC: 2 MG/DL (ref 1.8–2.4)
MCH RBC QN AUTO: 31.2 PG (ref 26.1–32.9)
MCHC RBC AUTO-ENTMCNC: 32.1 G/DL (ref 31.4–35)
MCV RBC AUTO: 97.2 FL (ref 79.6–97.8)
MONOCYTES # BLD: 0.5 K/UL (ref 0.1–1.3)
MONOCYTES NFR BLD: 9 % (ref 4–12)
NEUTS SEG # BLD: 3.8 K/UL (ref 1.7–8.2)
NEUTS SEG NFR BLD: 64 % (ref 43–78)
NRBC # BLD: 0 K/UL (ref 0–0.2)
PHOSPHATE SERPL-MCNC: 4 MG/DL (ref 2.3–3.7)
PLATELET # BLD AUTO: 269 K/UL (ref 150–450)
PMV BLD AUTO: 9 FL (ref 9.4–12.3)
POTASSIUM SERPL-SCNC: 4.5 MMOL/L (ref 3.5–5.1)
PROT SERPL-MCNC: 6.8 G/DL (ref 6.3–8.2)
RBC # BLD AUTO: 3.56 M/UL (ref 4.05–5.25)
SODIUM SERPL-SCNC: 142 MMOL/L (ref 136–145)
WBC # BLD AUTO: 5.9 K/UL (ref 4.3–11.1)

## 2021-02-19 PROCEDURE — 80053 COMPREHEN METABOLIC PANEL: CPT

## 2021-02-19 PROCEDURE — 36415 COLL VENOUS BLD VENIPUNCTURE: CPT

## 2021-02-19 PROCEDURE — 85025 COMPLETE CBC W/AUTO DIFF WBC: CPT

## 2021-02-19 PROCEDURE — 84100 ASSAY OF PHOSPHORUS: CPT

## 2021-02-19 PROCEDURE — 82977 ASSAY OF GGT: CPT

## 2021-02-19 PROCEDURE — 83735 ASSAY OF MAGNESIUM: CPT

## 2021-02-19 PROCEDURE — 83615 LACTATE (LD) (LDH) ENZYME: CPT

## 2021-02-19 NOTE — PROGRESS NOTES
2/19/2021- Patient seen in the office today. She day 46 s/p Auto HSCT. She is feeling well. Energy and appetite is improving. No fluids needed today. Refill for Xarelto sent. No other needs at this time.

## 2021-02-24 ENCOUNTER — APPOINTMENT (OUTPATIENT)
Dept: INFUSION THERAPY | Age: 61
End: 2021-02-24

## 2021-03-05 ENCOUNTER — PATIENT OUTREACH (OUTPATIENT)
Dept: CASE MANAGEMENT | Age: 61
End: 2021-03-05

## 2021-03-05 ENCOUNTER — HOSPITAL ENCOUNTER (OUTPATIENT)
Dept: INFUSION THERAPY | Age: 61
Discharge: HOME | End: 2021-03-05
Payer: COMMERCIAL

## 2021-03-05 DIAGNOSIS — R11.0 CHEMOTHERAPY-INDUCED NAUSEA: ICD-10-CM

## 2021-03-05 DIAGNOSIS — C90.00 MULTIPLE MYELOMA NOT HAVING ACHIEVED REMISSION (HCC): ICD-10-CM

## 2021-03-05 DIAGNOSIS — T45.1X5A CHEMOTHERAPY-INDUCED NAUSEA: ICD-10-CM

## 2021-03-05 DIAGNOSIS — Z94.81 S/P AUTOLOGOUS BONE MARROW TRANSPLANTATION (HCC): ICD-10-CM

## 2021-03-05 LAB
25(OH)D3 SERPL-MCNC: 32.1 NG/ML (ref 30–100)
ALBUMIN SERPL-MCNC: 3.4 G/DL (ref 3.2–4.6)
ALBUMIN/GLOB SERPL: 1.1 {RATIO} (ref 1.2–3.5)
ALP SERPL-CCNC: 82 U/L (ref 50–136)
ALT SERPL-CCNC: 70 U/L (ref 12–65)
ANION GAP SERPL CALC-SCNC: 6 MMOL/L (ref 7–16)
AST SERPL-CCNC: 29 U/L (ref 15–37)
BASOPHILS # BLD: 0 K/UL (ref 0–0.2)
BASOPHILS NFR BLD: 0 % (ref 0–2)
BILIRUB SERPL-MCNC: 0.2 MG/DL (ref 0.2–1.1)
BUN SERPL-MCNC: 16 MG/DL (ref 8–23)
CALCIUM SERPL-MCNC: 8.7 MG/DL (ref 8.3–10.4)
CHLORIDE SERPL-SCNC: 111 MMOL/L (ref 98–107)
CO2 SERPL-SCNC: 25 MMOL/L (ref 21–32)
CREAT SERPL-MCNC: 0.8 MG/DL (ref 0.6–1)
DIFFERENTIAL METHOD BLD: ABNORMAL
EOSINOPHIL # BLD: 0.2 K/UL (ref 0–0.8)
EOSINOPHIL NFR BLD: 4 % (ref 0.5–7.8)
ERYTHROCYTE [DISTWIDTH] IN BLOOD BY AUTOMATED COUNT: 15 % (ref 11.9–14.6)
GGT SERPL-CCNC: 73 U/L (ref 5–55)
GLOBULIN SER CALC-MCNC: 3.2 G/DL (ref 2.3–3.5)
GLUCOSE SERPL-MCNC: 71 MG/DL (ref 65–100)
HCT VFR BLD AUTO: 33.4 % (ref 35.8–46.3)
HGB BLD-MCNC: 10.8 G/DL (ref 11.7–15.4)
IMM GRANULOCYTES # BLD AUTO: 0 K/UL (ref 0–0.5)
IMM GRANULOCYTES NFR BLD AUTO: 0 % (ref 0–5)
LDH SERPL L TO P-CCNC: 168 U/L (ref 110–210)
LYMPHOCYTES # BLD: 1.2 K/UL (ref 0.5–4.6)
LYMPHOCYTES NFR BLD: 24 % (ref 13–44)
MAGNESIUM SERPL-MCNC: 2.1 MG/DL (ref 1.8–2.4)
MCH RBC QN AUTO: 31.7 PG (ref 26.1–32.9)
MCHC RBC AUTO-ENTMCNC: 32.3 G/DL (ref 31.4–35)
MCV RBC AUTO: 97.9 FL (ref 79.6–97.8)
MONOCYTES # BLD: 0.6 K/UL (ref 0.1–1.3)
MONOCYTES NFR BLD: 12 % (ref 4–12)
NEUTS SEG # BLD: 2.8 K/UL (ref 1.7–8.2)
NEUTS SEG NFR BLD: 59 % (ref 43–78)
NRBC # BLD: 0 K/UL (ref 0–0.2)
PHOSPHATE SERPL-MCNC: 3.3 MG/DL (ref 2.3–3.7)
PLATELET # BLD AUTO: 248 K/UL (ref 150–450)
PMV BLD AUTO: 8.9 FL (ref 9.4–12.3)
POTASSIUM SERPL-SCNC: 4.6 MMOL/L (ref 3.5–5.1)
PROT SERPL-MCNC: 6.6 G/DL (ref 6.3–8.2)
RBC # BLD AUTO: 3.41 M/UL (ref 4.05–5.25)
SODIUM SERPL-SCNC: 142 MMOL/L (ref 136–145)
WBC # BLD AUTO: 4.8 K/UL (ref 4.3–11.1)

## 2021-03-05 PROCEDURE — 83735 ASSAY OF MAGNESIUM: CPT

## 2021-03-05 PROCEDURE — 84100 ASSAY OF PHOSPHORUS: CPT

## 2021-03-05 PROCEDURE — 85025 COMPLETE CBC W/AUTO DIFF WBC: CPT

## 2021-03-05 PROCEDURE — 80053 COMPREHEN METABOLIC PANEL: CPT

## 2021-03-05 PROCEDURE — 83615 LACTATE (LD) (LDH) ENZYME: CPT

## 2021-03-05 PROCEDURE — 82977 ASSAY OF GGT: CPT

## 2021-03-05 PROCEDURE — 82306 VITAMIN D 25 HYDROXY: CPT

## 2021-03-05 NOTE — PROGRESS NOTES
3/5/21- Patient seen in the office today. She is day +65, feeling well. No need for IVF today. Lbs are stable. We will see her back in the office in two weeks. No other needs at this time.

## 2021-03-19 ENCOUNTER — PATIENT OUTREACH (OUTPATIENT)
Dept: CASE MANAGEMENT | Age: 61
End: 2021-03-19

## 2021-03-19 ENCOUNTER — HOSPITAL ENCOUNTER (OUTPATIENT)
Dept: LAB | Age: 61
Discharge: HOME | End: 2021-03-19
Payer: COMMERCIAL

## 2021-03-19 ENCOUNTER — HOSPITAL ENCOUNTER (OUTPATIENT)
Dept: INFUSION THERAPY | Age: 61
Discharge: HOME | End: 2021-03-19

## 2021-03-19 DIAGNOSIS — Z94.81 S/P AUTOLOGOUS BONE MARROW TRANSPLANTATION (HCC): ICD-10-CM

## 2021-03-19 DIAGNOSIS — C90.00 MULTIPLE MYELOMA NOT HAVING ACHIEVED REMISSION (HCC): ICD-10-CM

## 2021-03-19 LAB
ALBUMIN SERPL-MCNC: 3.6 G/DL (ref 3.2–4.6)
ALBUMIN/GLOB SERPL: 1.1 {RATIO} (ref 1.2–3.5)
ALP SERPL-CCNC: 108 U/L (ref 50–136)
ALT SERPL-CCNC: 89 U/L (ref 12–65)
ANION GAP SERPL CALC-SCNC: 5 MMOL/L (ref 7–16)
AST SERPL-CCNC: 27 U/L (ref 15–37)
BASOPHILS # BLD: 0 K/UL (ref 0–0.2)
BASOPHILS NFR BLD: 1 % (ref 0–2)
BILIRUB SERPL-MCNC: 0.2 MG/DL (ref 0.2–1.1)
BUN SERPL-MCNC: 21 MG/DL (ref 8–23)
CALCIUM SERPL-MCNC: 9.1 MG/DL (ref 8.3–10.4)
CHLORIDE SERPL-SCNC: 109 MMOL/L (ref 98–107)
CO2 SERPL-SCNC: 27 MMOL/L (ref 21–32)
CREAT SERPL-MCNC: 0.7 MG/DL (ref 0.6–1)
DIFFERENTIAL METHOD BLD: ABNORMAL
EOSINOPHIL # BLD: 0.5 K/UL (ref 0–0.8)
EOSINOPHIL NFR BLD: 8 % (ref 0.5–7.8)
ERYTHROCYTE [DISTWIDTH] IN BLOOD BY AUTOMATED COUNT: 14.7 % (ref 11.9–14.6)
GGT SERPL-CCNC: 105 U/L (ref 5–55)
GLOBULIN SER CALC-MCNC: 3.2 G/DL (ref 2.3–3.5)
GLUCOSE SERPL-MCNC: 82 MG/DL (ref 65–100)
HCT VFR BLD AUTO: 34.6 % (ref 35.8–46.3)
HGB BLD-MCNC: 11 G/DL (ref 11.7–15.4)
IMM GRANULOCYTES # BLD AUTO: 0 K/UL (ref 0–0.5)
IMM GRANULOCYTES NFR BLD AUTO: 0 % (ref 0–5)
LDH SERPL L TO P-CCNC: 172 U/L (ref 110–210)
LYMPHOCYTES # BLD: 1.2 K/UL (ref 0.5–4.6)
LYMPHOCYTES NFR BLD: 22 % (ref 13–44)
MAGNESIUM SERPL-MCNC: 2.1 MG/DL (ref 1.8–2.4)
MCH RBC QN AUTO: 31.4 PG (ref 26.1–32.9)
MCHC RBC AUTO-ENTMCNC: 31.8 G/DL (ref 31.4–35)
MCV RBC AUTO: 98.9 FL (ref 79.6–97.8)
MONOCYTES # BLD: 0.5 K/UL (ref 0.1–1.3)
MONOCYTES NFR BLD: 9 % (ref 4–12)
NEUTS SEG # BLD: 3.3 K/UL (ref 1.7–8.2)
NEUTS SEG NFR BLD: 60 % (ref 43–78)
NRBC # BLD: 0 K/UL (ref 0–0.2)
PHOSPHATE SERPL-MCNC: 4.5 MG/DL (ref 2.3–3.7)
PLATELET # BLD AUTO: 244 K/UL (ref 150–450)
PMV BLD AUTO: 8.7 FL (ref 9.4–12.3)
POTASSIUM SERPL-SCNC: 4.3 MMOL/L (ref 3.5–5.1)
PROT SERPL-MCNC: 6.8 G/DL (ref 6.3–8.2)
RBC # BLD AUTO: 3.5 M/UL (ref 4.05–5.25)
SODIUM SERPL-SCNC: 141 MMOL/L (ref 136–145)
WBC # BLD AUTO: 5.6 K/UL (ref 4.3–11.1)

## 2021-03-19 PROCEDURE — 82977 ASSAY OF GGT: CPT

## 2021-03-19 PROCEDURE — 83615 LACTATE (LD) (LDH) ENZYME: CPT

## 2021-03-19 PROCEDURE — 84100 ASSAY OF PHOSPHORUS: CPT

## 2021-03-19 PROCEDURE — 80053 COMPREHEN METABOLIC PANEL: CPT

## 2021-03-19 PROCEDURE — 83735 ASSAY OF MAGNESIUM: CPT

## 2021-03-19 PROCEDURE — 36415 COLL VENOUS BLD VENIPUNCTURE: CPT

## 2021-03-19 PROCEDURE — 85025 COMPLETE CBC W/AUTO DIFF WBC: CPT

## 2021-03-22 NOTE — PROGRESS NOTES
3/19/21- Patient seen in the office today. She is feeling well. Plans to return to work soon. Day 100 studies will be performed before next OV. 24 hour urine container provided to patient and instructed patient to be NPO before next OV r/t lipid panel being drawn. No other needs at this time.

## 2021-04-09 VITALS — WEIGHT: 130 LBS | HEIGHT: 60 IN | BODY MASS INDEX: 25.52 KG/M2

## 2021-04-13 ENCOUNTER — HOSPITAL ENCOUNTER (OUTPATIENT)
Dept: GENERAL RADIOLOGY | Age: 61
Discharge: HOME | End: 2021-04-13
Attending: INTERNAL MEDICINE
Payer: COMMERCIAL

## 2021-04-13 ENCOUNTER — HOSPITAL ENCOUNTER (OUTPATIENT)
Dept: NON INVASIVE DIAGNOSTICS | Age: 61
Discharge: HOME | End: 2021-04-13
Attending: INTERNAL MEDICINE
Payer: COMMERCIAL

## 2021-04-13 ENCOUNTER — APPOINTMENT (OUTPATIENT)
Dept: RESPIRATORY THERAPY | Age: 61
End: 2021-04-13
Attending: INTERNAL MEDICINE
Payer: COMMERCIAL

## 2021-04-13 ENCOUNTER — APPOINTMENT (OUTPATIENT)
Dept: NON INVASIVE DIAGNOSTICS | Age: 61
End: 2021-04-13
Attending: INTERNAL MEDICINE
Payer: COMMERCIAL

## 2021-04-13 DIAGNOSIS — Z94.81 S/P AUTOLOGOUS BONE MARROW TRANSPLANTATION (HCC): ICD-10-CM

## 2021-04-13 DIAGNOSIS — C90.00 MULTIPLE MYELOMA NOT HAVING ACHIEVED REMISSION (HCC): ICD-10-CM

## 2021-04-13 LAB
ATRIAL RATE: 73 BPM
CALCULATED P AXIS, ECG09: 53 DEGREES
CALCULATED R AXIS, ECG10: 55 DEGREES
CALCULATED T AXIS, ECG11: 54 DEGREES
DIAGNOSIS, 93000: NORMAL
P-R INTERVAL, ECG05: 164 MS
Q-T INTERVAL, ECG07: 406 MS
QRS DURATION, ECG06: 90 MS
QTC CALCULATION (BEZET), ECG08: 447 MS
VENTRICULAR RATE, ECG03: 73 BPM

## 2021-04-13 PROCEDURE — 93005 ELECTROCARDIOGRAM TRACING: CPT | Performed by: INTERNAL MEDICINE

## 2021-04-13 PROCEDURE — 93306 TTE W/DOPPLER COMPLETE: CPT

## 2021-04-13 PROCEDURE — 77075 RADEX OSSEOUS SURVEY COMPL: CPT

## 2021-04-14 ENCOUNTER — HOSPITAL ENCOUNTER (OUTPATIENT)
Dept: PET IMAGING | Age: 61
Discharge: HOME | End: 2021-04-14
Payer: COMMERCIAL

## 2021-04-14 DIAGNOSIS — Z94.81 S/P AUTOLOGOUS BONE MARROW TRANSPLANTATION (HCC): ICD-10-CM

## 2021-04-14 DIAGNOSIS — C90.00 MULTIPLE MYELOMA NOT HAVING ACHIEVED REMISSION (HCC): ICD-10-CM

## 2021-04-14 LAB
GLUCOSE BLD STRIP.AUTO-MCNC: 97 MG/DL (ref 65–100)
SERVICE CMNT-IMP: NORMAL

## 2021-04-14 PROCEDURE — 74011000636 HC RX REV CODE- 636: Performed by: INTERNAL MEDICINE

## 2021-04-14 PROCEDURE — 82962 GLUCOSE BLOOD TEST: CPT

## 2021-04-14 PROCEDURE — A9552 F18 FDG: HCPCS

## 2021-04-14 RX ORDER — SODIUM CHLORIDE 0.9 % (FLUSH) 0.9 %
10 SYRINGE (ML) INJECTION
Status: COMPLETED | OUTPATIENT
Start: 2021-04-14 | End: 2021-04-14

## 2021-04-14 RX ADMIN — DIATRIZOATE MEGLUMINE AND DIATRIZOATE SODIUM 10 ML: 660; 100 LIQUID ORAL; RECTAL at 07:51

## 2021-04-14 RX ADMIN — Medication 10 ML: at 07:51

## 2021-04-15 ENCOUNTER — HOSPITAL ENCOUNTER (OUTPATIENT)
Dept: CT IMAGING | Age: 61
Discharge: HOME | End: 2021-04-15
Attending: INTERNAL MEDICINE
Payer: COMMERCIAL

## 2021-04-27 ENCOUNTER — HOSPITAL ENCOUNTER (OUTPATIENT)
Dept: LAB | Age: 61
Discharge: HOME | End: 2021-04-27
Attending: INTERNAL MEDICINE
Payer: COMMERCIAL

## 2021-04-27 ENCOUNTER — HOSPITAL ENCOUNTER (OUTPATIENT)
Dept: GENERAL RADIOLOGY | Age: 61
Discharge: HOME | End: 2021-04-27
Attending: INTERNAL MEDICINE
Payer: COMMERCIAL

## 2021-04-27 ENCOUNTER — HOSPITAL ENCOUNTER (OUTPATIENT)
Dept: CT IMAGING | Age: 61
Discharge: HOME | End: 2021-04-27
Attending: INTERNAL MEDICINE
Payer: COMMERCIAL

## 2021-04-27 VITALS
HEIGHT: 60 IN | DIASTOLIC BLOOD PRESSURE: 63 MMHG | RESPIRATION RATE: 18 BRPM | WEIGHT: 130 LBS | SYSTOLIC BLOOD PRESSURE: 134 MMHG | OXYGEN SATURATION: 100 % | BODY MASS INDEX: 25.52 KG/M2 | HEART RATE: 70 BPM | TEMPERATURE: 97.8 F

## 2021-04-27 DIAGNOSIS — Z94.81 S/P AUTOLOGOUS BONE MARROW TRANSPLANTATION (HCC): ICD-10-CM

## 2021-04-27 DIAGNOSIS — C90.00 MULTIPLE MYELOMA NOT HAVING ACHIEVED REMISSION (HCC): ICD-10-CM

## 2021-04-27 LAB
25(OH)D3 SERPL-MCNC: 33.4 NG/ML (ref 30–100)
ALBUMIN SERPL-MCNC: 3.6 G/DL (ref 3.2–4.6)
ALBUMIN/GLOB SERPL: 1.3 {RATIO} (ref 1.2–3.5)
ALP SERPL-CCNC: 92 U/L (ref 50–136)
ALT SERPL-CCNC: 47 U/L (ref 12–65)
ANION GAP SERPL CALC-SCNC: 5 MMOL/L (ref 7–16)
AST SERPL-CCNC: 17 U/L (ref 15–37)
BASOPHILS # BLD: 0 K/UL (ref 0–0.2)
BASOPHILS # BLD: 0 K/UL (ref 0–0.2)
BASOPHILS NFR BLD: 0 % (ref 0–2)
BASOPHILS NFR BLD: 1 % (ref 0–2)
BILIRUB SERPL-MCNC: 0.3 MG/DL (ref 0.2–1.1)
BONE MARROW PREP & W,BMA: NORMAL
BUN SERPL-MCNC: 15 MG/DL (ref 8–23)
CALCIUM SERPL-MCNC: 8.9 MG/DL (ref 8.3–10.4)
CHLORIDE SERPL-SCNC: 110 MMOL/L (ref 98–107)
CHOLEST SERPL-MCNC: 175 MG/DL
CO2 SERPL-SCNC: 27 MMOL/L (ref 21–32)
CREAT SERPL-MCNC: 0.68 MG/DL (ref 0.6–1)
DIFFERENTIAL METHOD BLD: ABNORMAL
DIFFERENTIAL METHOD BLD: ABNORMAL
EOSINOPHIL # BLD: 0.1 K/UL (ref 0–0.8)
EOSINOPHIL # BLD: 0.1 K/UL (ref 0–0.8)
EOSINOPHIL NFR BLD: 3 % (ref 0.5–7.8)
EOSINOPHIL NFR BLD: 3 % (ref 0.5–7.8)
ERYTHROCYTE [DISTWIDTH] IN BLOOD BY AUTOMATED COUNT: 13 % (ref 11.9–14.6)
ERYTHROCYTE [DISTWIDTH] IN BLOOD BY AUTOMATED COUNT: 13.1 % (ref 11.9–14.6)
FSH SERPL-ACNC: 64.8 MIU/ML
GLOBULIN SER CALC-MCNC: 2.8 G/DL (ref 2.3–3.5)
GLUCOSE SERPL-MCNC: 91 MG/DL (ref 65–100)
HCT VFR BLD AUTO: 31.8 % (ref 35.8–46.3)
HCT VFR BLD AUTO: 31.9 % (ref 35.8–46.3)
HDLC SERPL-MCNC: 43 MG/DL (ref 40–60)
HDLC SERPL: 4.1 {RATIO}
HGB BLD-MCNC: 10.2 G/DL (ref 11.7–15.4)
HGB BLD-MCNC: 10.2 G/DL (ref 11.7–15.4)
IMM GRANULOCYTES # BLD AUTO: 0 K/UL (ref 0–0.5)
IMM GRANULOCYTES # BLD AUTO: 0 K/UL (ref 0–0.5)
IMM GRANULOCYTES NFR BLD AUTO: 0 % (ref 0–5)
IMM GRANULOCYTES NFR BLD AUTO: 1 % (ref 0–5)
LDLC SERPL CALC-MCNC: 105.8 MG/DL
LH SERPL-ACNC: 34 MIU/ML
LIPID PROFILE,FLP: ABNORMAL
LYMPHOCYTES # BLD: 1.1 K/UL (ref 0.5–4.6)
LYMPHOCYTES # BLD: 1.2 K/UL (ref 0.5–4.6)
LYMPHOCYTES NFR BLD: 28 % (ref 13–44)
LYMPHOCYTES NFR BLD: 29 % (ref 13–44)
MAGNESIUM SERPL-MCNC: 2.2 MG/DL (ref 1.8–2.4)
MCH RBC QN AUTO: 31.7 PG (ref 26.1–32.9)
MCH RBC QN AUTO: 32.2 PG (ref 26.1–32.9)
MCHC RBC AUTO-ENTMCNC: 32 G/DL (ref 31.4–35)
MCHC RBC AUTO-ENTMCNC: 32.1 G/DL (ref 31.4–35)
MCV RBC AUTO: 100.6 FL (ref 79.6–97.8)
MCV RBC AUTO: 98.8 FL (ref 79.6–97.8)
MONOCYTES # BLD: 0.4 K/UL (ref 0.1–1.3)
MONOCYTES # BLD: 0.5 K/UL (ref 0.1–1.3)
MONOCYTES NFR BLD: 12 % (ref 4–12)
MONOCYTES NFR BLD: 12 % (ref 4–12)
NEUTS SEG # BLD: 2.1 K/UL (ref 1.7–8.2)
NEUTS SEG # BLD: 2.5 K/UL (ref 1.7–8.2)
NEUTS SEG NFR BLD: 56 % (ref 43–78)
NEUTS SEG NFR BLD: 57 % (ref 43–78)
NRBC # BLD: 0 K/UL (ref 0–0.2)
NRBC # BLD: 0 K/UL (ref 0–0.2)
PLATELET # BLD AUTO: 227 K/UL (ref 150–450)
PLATELET # BLD AUTO: 238 K/UL (ref 150–450)
PMV BLD AUTO: 9 FL (ref 9.4–12.3)
PMV BLD AUTO: 9.1 FL (ref 9.4–12.3)
POTASSIUM SERPL-SCNC: 3.9 MMOL/L (ref 3.5–5.1)
PROT SERPL-MCNC: 6.4 G/DL (ref 6.3–8.2)
RBC # BLD AUTO: 3.17 M/UL (ref 4.05–5.2)
RBC # BLD AUTO: 3.22 M/UL (ref 4.05–5.2)
SODIUM SERPL-SCNC: 142 MMOL/L (ref 136–145)
T4 FREE SERPL-MCNC: 0.8 NG/DL (ref 0.78–1.46)
TRIGL SERPL-MCNC: 131 MG/DL (ref 35–150)
TSH SERPL DL<=0.005 MIU/L-ACNC: 2.3 UIU/ML (ref 0.36–3.74)
VLDLC SERPL CALC-MCNC: 26.2 MG/DL (ref 6–23)
WBC # BLD AUTO: 3.7 K/UL (ref 4.3–11.1)
WBC # BLD AUTO: 4.4 K/UL (ref 4.3–11.1)

## 2021-04-27 PROCEDURE — 85025 COMPLETE CBC W/AUTO DIFF WBC: CPT

## 2021-04-27 PROCEDURE — 83002 ASSAY OF GONADOTROPIN (LH): CPT

## 2021-04-27 PROCEDURE — 84439 ASSAY OF FREE THYROXINE: CPT

## 2021-04-27 PROCEDURE — 80053 COMPREHEN METABOLIC PANEL: CPT

## 2021-04-27 PROCEDURE — 88311 DECALCIFY TISSUE: CPT

## 2021-04-27 PROCEDURE — 84443 ASSAY THYROID STIM HORMONE: CPT

## 2021-04-27 PROCEDURE — 83883 ASSAY NEPHELOMETRY NOT SPEC: CPT

## 2021-04-27 PROCEDURE — 86359 T CELLS TOTAL COUNT: CPT

## 2021-04-27 PROCEDURE — 74011000250 HC RX REV CODE- 250: Performed by: RADIOLOGY

## 2021-04-27 PROCEDURE — 83001 ASSAY OF GONADOTROPIN (FSH): CPT

## 2021-04-27 PROCEDURE — 82306 VITAMIN D 25 HYDROXY: CPT

## 2021-04-27 PROCEDURE — 86335 IMMUNFIX E-PHORSIS/URINE/CSF: CPT

## 2021-04-27 PROCEDURE — 74011250637 HC RX REV CODE- 250/637: Performed by: RADIOLOGY

## 2021-04-27 PROCEDURE — 80061 LIPID PANEL: CPT

## 2021-04-27 PROCEDURE — 84166 PROTEIN E-PHORESIS/URINE/CSF: CPT

## 2021-04-27 PROCEDURE — 82784 ASSAY IGA/IGD/IGG/IGM EACH: CPT

## 2021-04-27 PROCEDURE — 86334 IMMUNOFIX E-PHORESIS SERUM: CPT

## 2021-04-27 PROCEDURE — 88313 SPECIAL STAINS GROUP 2: CPT

## 2021-04-27 PROCEDURE — 71046 X-RAY EXAM CHEST 2 VIEWS: CPT

## 2021-04-27 PROCEDURE — 36415 COLL VENOUS BLD VENIPUNCTURE: CPT

## 2021-04-27 PROCEDURE — 77030028872 CT BX BONE MARROW AND ASPIRATION

## 2021-04-27 PROCEDURE — 83735 ASSAY OF MAGNESIUM: CPT

## 2021-04-27 PROCEDURE — 88305 TISSUE EXAM BY PATHOLOGIST: CPT

## 2021-04-27 PROCEDURE — 82232 ASSAY OF BETA-2 PROTEIN: CPT

## 2021-04-27 RX ORDER — LIDOCAINE HYDROCHLORIDE 20 MG/ML
20-300 INJECTION, SOLUTION INFILTRATION; PERINEURAL
Status: DISCONTINUED | OUTPATIENT
Start: 2021-04-27 | End: 2021-05-01 | Stop reason: HOSPADM

## 2021-04-27 RX ORDER — DIAZEPAM 5 MG/1
10 TABLET ORAL ONCE
Status: COMPLETED | OUTPATIENT
Start: 2021-04-27 | End: 2021-04-27

## 2021-04-27 RX ORDER — OXYCODONE HYDROCHLORIDE 5 MG/1
10 TABLET ORAL ONCE
Status: COMPLETED | OUTPATIENT
Start: 2021-04-27 | End: 2021-04-27

## 2021-04-27 RX ADMIN — LIDOCAINE HYDROCHLORIDE 180 MG: 20 INJECTION, SOLUTION INFILTRATION; PERINEURAL at 13:37

## 2021-04-27 RX ADMIN — OXYCODONE HYDROCHLORIDE 10 MG: 5 TABLET ORAL at 13:24

## 2021-04-27 RX ADMIN — DIAZEPAM 10 MG: 5 TABLET ORAL at 13:24

## 2021-04-27 NOTE — DISCHARGE INSTRUCTIONS
[address]  Department of Interventional Radiology  [address] [facility]  [phone] Office  [phone] Fax    BIOPSY DISCHARGE INSTRUCTIONS    General Instructions:     A biopsy is the removal of a small piece of tissue for microscopic examination or testing. Healthy tissue can be obtained for the purpose of tissue-type matching for transplants. Unhealthy tissues are more commonly biopsied to diagnose disease. Lung Biopsy:     A needle lung biopsy is performed when there is a mass discovered in the lung area. The most serious risk is infection, bleeding, and pneumothorax (a collapsed lung). Signs of pneumothorax include shortness of breath, rapid heart rate, and blueness of the skin. If any of these occur, call 911. Liver Biopsy: This test helps detect cancer, infections, and the cause of an enlargement of the liver or elevated liver enzymes. It also helps to diagnose a number of liver diseases. The pain associated with the procedure may be felt in the shoulder. The risks include internal bleeding, pneumothorax, and injury to the surrounding organs. Renal Biopsy: This procedure is sometimes done to evaluate a transplanted kidney. It is also used to evaluate unexplained decrease in kidney function, blood, or protein in the urine. You may see bright red blood in the urine the first 24 hours after the test. If the bleeding lasts longer, you need to call your doctor. There is a risk of infection and bleeding into the muscle, which may cause soreness. Spinal Biopsy: This test is sometimes done in conjunction with other procedures. Your back will be sore, as we are taking a small sample of bone, which is slightly more difficult to sample than tissue. General Biopsy:     A mass can grow in any area of the body, and we are taking a specimen as ordered by your doctor. The risks are the same.  They include bleeding, pain, and infection. Home Care Instructions: You may resume your regular diet and medication regimen. Do not drink alcohol, drive, or make any important legal decisions in the next 24 hours. Do not lift anything heavier than a gallon of milk until the soreness goes away. You may use over the counter acetaminophen or ibuprofen for the soreness. You may apply an ice pack to the affected area for 20-30 minutes at time for the first 24 hours. After that, you may apply a heat pack. Call If: You should call your Physician and/or the Radiology Nurse if you have any questions or concerns about the biopsy site. Call if you should have increased pain, fever, redness, drainage, or bleeding more than a small spot on the bandage. Follow-Up Instructions: Please see your ordering doctor as he/she has requested. To Reach Us: If you have any questions about your procedure, please call the Interventional Radiology department at [phone]. After business hours (5pm) and weekends, call the answering service at [phone] and ask for the Radiologist on call to be paged. Si tiene Preguntas acerca del procedimiento, por favor llame al departamento de Radiología Intervencional al [phone]. Después de horas de oficina (5 pm) y los fines de [place], llamar al [facility] al [phone] y pregunte por el Radiologo de [facility]. Interventional Radiology General Nurse Discharge    After general anesthesia or intravenous sedation, for 24 hours or while taking prescription Narcotics:  · Limit your activities  · Do not drive and operate hazardous machinery  · Do not make important personal or business decisions  · Do  not drink alcoholic beverages  · If you have not urinated within 8 hours after discharge, please contact your surgeon on call. * Please give a list of your current medications to your Primary Care Provider.   * Please update this list whenever your medications are discontinued, doses are changed, or new medications (including over-the-counter products) are added. * Please carry medication information at all times in case of emergency situations. These are general instructions for a healthy lifestyle:    No smoking/ No tobacco products/ Avoid exposure to second hand smoke  Surgeon General's Warning:  Quitting smoking now greatly reduces serious risk to your health. Obesity, smoking, and sedentary lifestyle greatly increases your risk for illness  A healthy diet, regular physical exercise & weight monitoring are important for maintaining a healthy lifestyle    You may be retaining fluid if you have a history of heart failure or if you experience any of the following symptoms:  Weight gain of 3 pounds or more overnight or 5 pounds in a week, increased swelling in our hands or feet or shortness of breath while lying flat in bed. Please call your doctor as soon as you notice any of these symptoms; do not wait until your next office visit. Recognize signs and symptoms of STROKE:  F-face looks uneven    A-arms unable to move or move unevenly    S-speech slurred or non-existent    T-time-call 911 as soon as signs and symptoms begin-DO NOT go       Back to bed or wait to see if you get better-TIME IS BRAIN.       Patient Signature:  Date: 4/27/2021  Discharging Nurse: Alee Morales RN

## 2021-04-27 NOTE — PROGRESS NOTES
Pt to CT via stretcher. IR Nurse Pre-Procedure Checklist Part 2          Consent signed: Yes    H&P complete:  Not applicable    Antibiotics: Not applicable    Airway/Mallampati Done: Not applicable    Shaved: Not applicable    Pregnancy Form:Not applicable    Patient Position: Yes    MD Side: Yes     Biopsy Worksheet: Yes    Specimen Medium: Yes    The patient was counseled at length about the risks of carlin Covid-19 during their perioperative period and any recovery window from their procedure. The patient was made aware that carlin Covid-19  may worsen their prognosis for recovering from their procedure and lend to a higher morbidity and/or mortality risk. All material risks, benefits, and reasonable alternatives including postponing the procedure were discussed. The patient does  wish to proceed with the procedure at this time.

## 2021-04-27 NOTE — PROCEDURES
Department of Interventional Radiology  [phone]        Interventional Radiology Brief Procedure Note    Patient: Daria Carver MRN: [id number]  SSN: [id number]    YOB: 1960  Age: 56 y.o. Sex: female      Date of Procedure: 4/27/2021    Pre-Procedure Diagnosis: Multiple Myeloma. Post-Procedure Diagnosis: SAME    Procedure(s): Image Guided Biopsy    Brief Description of Procedure: Right iliac bone. Performed By: Silverio Dey MD     Assistants: None    Anesthesia:Anxiolysis    Estimated Blood Loss: Less than 10ml    Specimens:  Pathology    Implants:  None    Findings: Unremarkable. Complications: None    Recommendations: Remove bandage tomorrow.       Follow Up: Dr Hayes Fitting    Signed By: Silverio Dey MD     April 27, 2021

## 2021-04-28 LAB
CD3 CELLS # BLD: 677 /UL (ref 622–2402)
CD3 CELLS NFR BLD: 61.5 % (ref 57.5–86.2)
CD3+CD4+ CELLS # BLD: 406 /UL (ref 359–1519)
CD3+CD4+ CELLS NFR BLD: 36.9 % (ref 30.8–58.5)
CD3+CD4+ CELLS/CD3+CD8+ CLL BLD: 1.46 % (ref 0.92–3.72)
CD3+CD8+ CELLS # BLD: 277 /UL (ref 109–897)
CD3+CD8+ CELLS NFR BLD: 25.2 % (ref 12–35.5)
LYMPHOCYTES # BLD AUTO: 1.1 X10E3/UL (ref 0.7–3.1)
LYMPHOCYTES NFR BLD AUTO: 29 %
WBC # BLD AUTO: 3.7 X10E3/UL (ref 3.4–10.8)

## 2021-04-29 LAB
B2 MICROGLOB SERPL-MCNC: 2.1 MG/L (ref 0.8–2.34)
KAPPA LC FREE SER-MCNC: 4.69 MG/L (ref 3.3–19.4)
KAPPA LC FREE/LAMBDA FREE SER: 0.39 {RATIO} (ref 0.26–1.65)
LAMBDA LC FREE SERPL-MCNC: 12.15 MG/L (ref 5.71–26.3)

## 2021-04-30 LAB
ALBUMIN SERPL ELPH-MCNC: 3.45 G/DL (ref 3.2–5.6)
ALBUMIN UR ELPH-MCNC: <1.6 MG/DL
ALBUMIN/GLOB SERPL: 1.3 {RATIO}
ALPHA1 GLOB 24H UR ELPH-MCNC: <0.3 MG/DL
ALPHA1 GLOB SERPL ELPH-MCNC: 0.26 G/DL (ref 0.1–0.4)
ALPHA2 GLOB SERPL ELPH-MCNC: 0.82 G/DL (ref 0.4–1.2)
ALPHA2 GLOB SERPL ELPH-MCNC: <0.9 MG/DL
B-GLOBULIN SERPL QL ELPH: 1.03 G/DL (ref 0.6–1.3)
B-GLOBULIN UR QL ELPH: <1.4 MG/DL
GAMMA GLOB MFR SERPL ELPH: 0.64 G/DL (ref 0.5–1.6)
GAMMA GLOB MFR UR ELPH: <0.8 MG/DL
IGA SERPL-MCNC: 38 MG/DL (ref 85–499)
IGG SERPL-MCNC: 583 MG/DL (ref 610–1616)
IGM SERPL-MCNC: 27 MG/DL (ref 35–242)
M PROTEIN SERPL ELPH-MCNC: ABNORMAL G/DL
M PROTEIN UR-MCNC: NORMAL MG/DL
PROT PATTERN SERPL ELPH-IMP: ABNORMAL
PROT PATTERN SPEC IFE-IMP: ABNORMAL
PROT PATTERN SPEC IFE-IMP: NORMAL
PROT PATTERN UR ELPH-IMP: NORMAL
PROT SERPL-MCNC: 6.2 G/DL (ref 6.3–8.2)
PROT UR-MCNC: <5 MG/DL

## 2021-05-03 ENCOUNTER — HOSPITAL ENCOUNTER (OUTPATIENT)
Dept: RESPIRATORY THERAPY | Age: 61
Discharge: HOME | End: 2021-05-03
Attending: INTERNAL MEDICINE
Payer: COMMERCIAL

## 2021-05-03 DIAGNOSIS — Z94.81 S/P AUTOLOGOUS BONE MARROW TRANSPLANTATION (HCC): ICD-10-CM

## 2021-05-03 PROCEDURE — 94726 PLETHYSMOGRAPHY LUNG VOLUMES: CPT

## 2021-05-03 PROCEDURE — 94729 DIFFUSING CAPACITY: CPT

## 2021-05-03 PROCEDURE — 94010 BREATHING CAPACITY TEST: CPT

## 2021-05-07 ENCOUNTER — HOSPITAL ENCOUNTER (OUTPATIENT)
Dept: LAB | Age: 61
Discharge: HOME | End: 2021-05-07
Payer: COMMERCIAL

## 2021-05-07 ENCOUNTER — PATIENT OUTREACH (OUTPATIENT)
Dept: CASE MANAGEMENT | Age: 61
End: 2021-05-07

## 2021-05-07 DIAGNOSIS — C90.01 MULTIPLE MYELOMA IN REMISSION (HCC): Primary | ICD-10-CM

## 2021-05-07 DIAGNOSIS — Z94.81 S/P AUTOLOGOUS BONE MARROW TRANSPLANTATION (HCC): ICD-10-CM

## 2021-05-07 DIAGNOSIS — Z94.84 HISTORY OF AUTOLOGOUS STEM CELL TRANSPLANT (HCC): ICD-10-CM

## 2021-05-07 DIAGNOSIS — C90.00 MULTIPLE MYELOMA NOT HAVING ACHIEVED REMISSION (HCC): ICD-10-CM

## 2021-05-07 LAB
ALBUMIN SERPL-MCNC: 3.7 G/DL (ref 3.2–4.6)
ALBUMIN/GLOB SERPL: 1.2 {RATIO} (ref 1.2–3.5)
ALP SERPL-CCNC: 82 U/L (ref 50–136)
ALT SERPL-CCNC: 32 U/L (ref 12–65)
ANION GAP SERPL CALC-SCNC: 4 MMOL/L (ref 7–16)
AST SERPL-CCNC: 14 U/L (ref 15–37)
BASOPHILS # BLD: 0 K/UL (ref 0–0.2)
BASOPHILS NFR BLD: 1 % (ref 0–2)
BILIRUB SERPL-MCNC: 0.2 MG/DL (ref 0.2–1.1)
BUN SERPL-MCNC: 15 MG/DL (ref 8–23)
CALCIUM SERPL-MCNC: 8.4 MG/DL (ref 8.3–10.4)
CHLORIDE SERPL-SCNC: 108 MMOL/L (ref 98–107)
CO2 SERPL-SCNC: 28 MMOL/L (ref 21–32)
CREAT SERPL-MCNC: 1.2 MG/DL (ref 0.6–1)
DIFFERENTIAL METHOD BLD: ABNORMAL
EOSINOPHIL # BLD: 0.1 K/UL (ref 0–0.8)
EOSINOPHIL NFR BLD: 3 % (ref 0.5–7.8)
ERYTHROCYTE [DISTWIDTH] IN BLOOD BY AUTOMATED COUNT: 13.2 % (ref 11.9–14.6)
GGT SERPL-CCNC: 47 U/L (ref 5–55)
GLOBULIN SER CALC-MCNC: 3.1 G/DL (ref 2.3–3.5)
GLUCOSE SERPL-MCNC: 100 MG/DL (ref 65–100)
HCT VFR BLD AUTO: 34.4 % (ref 35.8–46.3)
HGB BLD-MCNC: 11 G/DL (ref 11.7–15.4)
IMM GRANULOCYTES # BLD AUTO: 0 K/UL (ref 0–0.5)
IMM GRANULOCYTES NFR BLD AUTO: 0 % (ref 0–5)
LYMPHOCYTES # BLD: 1.1 K/UL (ref 0.5–4.6)
LYMPHOCYTES NFR BLD: 32 % (ref 13–44)
MAGNESIUM SERPL-MCNC: 2.1 MG/DL (ref 1.8–2.4)
MCH RBC QN AUTO: 31.9 PG (ref 26.1–32.9)
MCHC RBC AUTO-ENTMCNC: 32 G/DL (ref 31.4–35)
MCV RBC AUTO: 99.7 FL (ref 79.6–97.8)
MONOCYTES # BLD: 0.4 K/UL (ref 0.1–1.3)
MONOCYTES NFR BLD: 11 % (ref 4–12)
NEUTS SEG # BLD: 1.9 K/UL (ref 1.7–8.2)
NEUTS SEG NFR BLD: 54 % (ref 43–78)
NRBC # BLD: 0 K/UL (ref 0–0.2)
PHOSPHATE SERPL-MCNC: 3.8 MG/DL (ref 2.3–3.7)
PLATELET # BLD AUTO: 202 K/UL (ref 150–450)
PMV BLD AUTO: 9.1 FL (ref 9.4–12.3)
POTASSIUM SERPL-SCNC: 4.4 MMOL/L (ref 3.5–5.1)
PROT SERPL-MCNC: 6.8 G/DL (ref 6.3–8.2)
RBC # BLD AUTO: 3.45 M/UL (ref 4.05–5.2)
SODIUM SERPL-SCNC: 140 MMOL/L (ref 136–145)
WBC # BLD AUTO: 3.5 K/UL (ref 4.3–11.1)

## 2021-05-07 PROCEDURE — 80053 COMPREHEN METABOLIC PANEL: CPT

## 2021-05-07 PROCEDURE — 84156 ASSAY OF PROTEIN URINE: CPT

## 2021-05-07 PROCEDURE — 84100 ASSAY OF PHOSPHORUS: CPT

## 2021-05-07 PROCEDURE — 82977 ASSAY OF GGT: CPT

## 2021-05-07 PROCEDURE — 86335 IMMUNFIX E-PHORSIS/URINE/CSF: CPT

## 2021-05-07 PROCEDURE — 85025 COMPLETE CBC W/AUTO DIFF WBC: CPT

## 2021-05-07 PROCEDURE — 83735 ASSAY OF MAGNESIUM: CPT

## 2021-05-07 PROCEDURE — 36415 COLL VENOUS BLD VENIPUNCTURE: CPT

## 2021-05-10 LAB
ALBUMIN UR ELPH-MCNC: <1.8 MG/DL
ALPHA1 GLOB 24H UR ELPH-MCNC: <0.3 MG/DL
ALPHA2 GLOB SERPL ELPH-MCNC: <0.8 MG/DL
B-GLOBULIN UR QL ELPH: <1.4 MG/DL
COLLECT DURATION TIME UR: 24 HR
GAMMA GLOB MFR UR ELPH: <0.6 MG/DL
M PROTEIN UR-MCNC: NORMAL MG/DL
PROT 24H UR-MRATE: NORMAL MG/24HR
PROT PATTERN SPEC IFE-IMP: NORMAL
PROT PATTERN UR ELPH-IMP: NORMAL
PROT UR-MCNC: <5 MG/DL
SPECIMEN VOL ?TM UR: 1500 ML

## 2021-06-23 ENCOUNTER — HOSPITAL ENCOUNTER (OUTPATIENT)
Dept: INTERVENTIONAL RADIOLOGY/VASCULAR | Age: 61
Discharge: HOME | End: 2021-06-23
Attending: INTERNAL MEDICINE
Payer: COMMERCIAL

## 2021-06-23 VITALS
WEIGHT: 131 LBS | SYSTOLIC BLOOD PRESSURE: 132 MMHG | HEIGHT: 60 IN | BODY MASS INDEX: 25.72 KG/M2 | DIASTOLIC BLOOD PRESSURE: 70 MMHG | RESPIRATION RATE: 16 BRPM | TEMPERATURE: 97.5 F | HEART RATE: 65 BPM | OXYGEN SATURATION: 98 %

## 2021-06-23 DIAGNOSIS — Z94.84 HISTORY OF AUTOLOGOUS STEM CELL TRANSPLANT (HCC): ICD-10-CM

## 2021-06-23 PROCEDURE — 36590 REMOVAL TUNNELED CV CATH: CPT

## 2021-06-23 PROCEDURE — 77030010507 HC ADH SKN DERMBND J&J -B

## 2021-06-23 PROCEDURE — 74011000250 HC RX REV CODE- 250: Performed by: PHYSICIAN ASSISTANT

## 2021-06-23 PROCEDURE — 77030031131 HC SUT MXN P COVD -B

## 2021-06-23 PROCEDURE — 77030031139 HC SUT VCRL2 J&J -A

## 2021-06-23 RX ADMIN — LIDOCAINE HYDROCHLORIDE 100 MG: 10; .005 INJECTION, SOLUTION EPIDURAL; INFILTRATION; INTRACAUDAL; PERINEURAL at 08:25

## 2021-06-23 NOTE — PROGRESS NOTES
Recovery period without difficulty. Pt alert and oriented and denies pain. Dressing is clean, dry, and intact. Reviewed discharge instructions with patient, verbalized understanding.

## 2021-06-23 NOTE — DISCHARGE INSTRUCTIONS
[address]  Department of Interventional Radiology  [facility]  [phone] Office  [phone] Fax    DRAIN/PORT/CATHETER REMOVAL  DISCHARGE INSTRUCTIONS    General Information:     Your doctor has ordered for us to remove your drain, port, or catheter. This could be that you do not need it anymore, it is not doing its job, your physician has decided on another plan for your treatment and/or it may need replacing. Home Care Instructions: You can resume your regular diet and medication regimen. Do not drink alcohol, drive, or make any important legal decisions in the next 24 hours. Do not lift anything heavier than a gallon of milk, or do anything strenuous for the next 24 hours. You will notice a dressing over the site of the removal. This dressing should stay in place until the site is healed. The dressing should be changed at least every 48 hours. You should change the dressing sooner if it becomes soiled or wet. The nurse who discharges you to home should review with you any wound care instructions. Resume your normal level of activity slowly. You may shower after 24 hours, but do not take a bath, swim or immerse yourself in water until the site has healed, and keep the dressing dry with plastic wrap while showering. The site may ooze for a couple of days. This drainage should lessen with each passing day. Call If:     You should call your Physician and/or the Radiology Nurse if you have any bleeding other than a small spot on your bandage. Call if you have any signs of infection, fever, or increased pain at the site of the tube. Call if the oozing increases, if it changes color, or begins to have an odor. Follow-Up Instructions: Please see your ordering doctor as he/she has requested. To Reach Us: If you have any questions about your procedure, please call the Interventional Radiology department at [phone]. After business hours (5pm) and weekends, call the answering service at [phone] and ask for the Radiologist on call to be paged. Si tiene Preguntas acerca del procedimiento, por favor llame al departamento de Radiología Intervencional al [phone]. Después de horas de oficina (5 pm) y los fines de [place], llamar al Manus Slice Sharon al [phone] y pregunte por el Radiologo de Norma Colemanhijohn. Interventional Radiology General Nurse Discharge    After general anesthesia or intravenous sedation, for 24 hours or while taking prescription Narcotics:  · Limit your activities  · Do not drive and operate hazardous machinery  · Do not make important personal or business decisions  · Do  not drink alcoholic beverages  · If you have not urinated within 8 hours after discharge, please contact your surgeon on call. * Please give a list of your current medications to your Primary Care Provider. * Please update this list whenever your medications are discontinued, doses are     changed, or new medications (including over-the-counter products) are added. * Please carry medication information at all times in case of emergency situations. These are general instructions for a healthy lifestyle:    No smoking/ No tobacco products/ Avoid exposure to second hand smoke  Surgeon General's Warning:  Quitting smoking now greatly reduces serious risk to your health. Obesity, smoking, and sedentary lifestyle greatly increases your risk for illness  A healthy diet, regular physical exercise & weight monitoring are important for maintaining a healthy lifestyle    You may be retaining fluid if you have a history of heart failure or if you experience any of the following symptoms:  Weight gain of 3 pounds or more overnight or 5 pounds in a week, increased swelling in our hands or feet or shortness of breath while lying flat in bed.   Please call your doctor as soon as you notice any of these symptoms; do not wait until your next office visit. Recognize signs and symptoms of STROKE:  F-face looks uneven    A-arms unable to move or move unevenly    S-speech slurred or non-existent    T-time-call 911 as soon as signs and symptoms begin-DO NOT go       Back to bed or wait to see if you get better-TIME IS BRAIN.       Patient Signature:  Date: 6/23/2021  Discharging Nurse: Bridget Caceres RN Hpi Title: Evaluation of Skin Lesions

## 2021-06-30 ENCOUNTER — PATIENT OUTREACH (OUTPATIENT)
Dept: CASE MANAGEMENT | Age: 61
End: 2021-06-30

## 2021-06-30 ENCOUNTER — HOSPITAL ENCOUNTER (OUTPATIENT)
Dept: LAB | Age: 61
Discharge: HOME | End: 2021-06-30
Payer: COMMERCIAL

## 2021-06-30 DIAGNOSIS — C90.01 MULTIPLE MYELOMA IN REMISSION (HCC): ICD-10-CM

## 2021-06-30 PROBLEM — Z94.81 S/P AUTOLOGOUS BONE MARROW TRANSPLANTATION (HCC): Status: ACTIVE | Noted: 2021-06-30

## 2021-06-30 LAB
25(OH)D3 SERPL-MCNC: 27 NG/ML (ref 30–100)
ALBUMIN SERPL-MCNC: 3.4 G/DL (ref 3.2–4.6)
ALBUMIN/GLOB SERPL: 1.2 {RATIO} (ref 1.2–3.5)
ALP SERPL-CCNC: 59 U/L (ref 50–136)
ALT SERPL-CCNC: 34 U/L (ref 12–65)
ANION GAP SERPL CALC-SCNC: 6 MMOL/L (ref 7–16)
AST SERPL-CCNC: 14 U/L (ref 15–37)
BASOPHILS # BLD: 0 K/UL (ref 0–0.2)
BASOPHILS NFR BLD: 1 % (ref 0–2)
BILIRUB SERPL-MCNC: 0.2 MG/DL (ref 0.2–1.1)
BUN SERPL-MCNC: 16 MG/DL (ref 8–23)
CALCIUM SERPL-MCNC: 8.7 MG/DL (ref 8.3–10.4)
CHLORIDE SERPL-SCNC: 108 MMOL/L (ref 98–107)
CO2 SERPL-SCNC: 27 MMOL/L (ref 21–32)
CREAT SERPL-MCNC: 0.9 MG/DL (ref 0.6–1)
DIFFERENTIAL METHOD BLD: ABNORMAL
EOSINOPHIL # BLD: 0.2 K/UL (ref 0–0.8)
EOSINOPHIL NFR BLD: 7 % (ref 0.5–7.8)
ERYTHROCYTE [DISTWIDTH] IN BLOOD BY AUTOMATED COUNT: 14 % (ref 11.9–14.6)
GGT SERPL-CCNC: 28 U/L (ref 5–55)
GLOBULIN SER CALC-MCNC: 2.9 G/DL (ref 2.3–3.5)
GLUCOSE SERPL-MCNC: 90 MG/DL (ref 65–100)
HCT VFR BLD AUTO: 34 % (ref 35.8–46.3)
HGB BLD-MCNC: 10.8 G/DL (ref 11.7–15.4)
IMM GRANULOCYTES # BLD AUTO: 0 K/UL (ref 0–0.5)
IMM GRANULOCYTES NFR BLD AUTO: 0 % (ref 0–5)
LYMPHOCYTES # BLD: 0.8 K/UL (ref 0.5–4.6)
LYMPHOCYTES NFR BLD: 26 % (ref 13–44)
MAGNESIUM SERPL-MCNC: 2 MG/DL (ref 1.8–2.4)
MCH RBC QN AUTO: 31.1 PG (ref 26.1–32.9)
MCHC RBC AUTO-ENTMCNC: 31.8 G/DL (ref 31.4–35)
MCV RBC AUTO: 98 FL (ref 79.6–97.8)
MONOCYTES # BLD: 0.4 K/UL (ref 0.1–1.3)
MONOCYTES NFR BLD: 12 % (ref 4–12)
NEUTS SEG # BLD: 1.7 K/UL (ref 1.7–8.2)
NEUTS SEG NFR BLD: 55 % (ref 43–78)
NRBC # BLD: 0 K/UL (ref 0–0.2)
PLATELET # BLD AUTO: 211 K/UL (ref 150–450)
PMV BLD AUTO: 9.6 FL (ref 9.4–12.3)
POTASSIUM SERPL-SCNC: 3.9 MMOL/L (ref 3.5–5.1)
PROT SERPL-MCNC: 6.3 G/DL (ref 6.3–8.2)
RBC # BLD AUTO: 3.47 M/UL (ref 4.05–5.2)
SODIUM SERPL-SCNC: 141 MMOL/L (ref 136–145)
WBC # BLD AUTO: 3.1 K/UL (ref 4.3–11.1)

## 2021-06-30 PROCEDURE — 86359 T CELLS TOTAL COUNT: CPT

## 2021-06-30 PROCEDURE — 82232 ASSAY OF BETA-2 PROTEIN: CPT

## 2021-06-30 PROCEDURE — 82977 ASSAY OF GGT: CPT

## 2021-06-30 PROCEDURE — 86334 IMMUNOFIX E-PHORESIS SERUM: CPT

## 2021-06-30 PROCEDURE — 36415 COLL VENOUS BLD VENIPUNCTURE: CPT

## 2021-06-30 PROCEDURE — 82306 VITAMIN D 25 HYDROXY: CPT

## 2021-06-30 PROCEDURE — 83883 ASSAY NEPHELOMETRY NOT SPEC: CPT

## 2021-06-30 PROCEDURE — 82784 ASSAY IGA/IGD/IGG/IGM EACH: CPT

## 2021-06-30 PROCEDURE — 85025 COMPLETE CBC W/AUTO DIFF WBC: CPT

## 2021-06-30 PROCEDURE — 83735 ASSAY OF MAGNESIUM: CPT

## 2021-06-30 PROCEDURE — 80053 COMPREHEN METABOLIC PANEL: CPT

## 2021-06-30 NOTE — PROGRESS NOTES
6/30/2021 patient in for follow up; patient denies pain/sob/nv; patient states feeling well; patient has stopped taking Revilimid at this time due to rash; is taking benadryl; will resume Revlimid when Dr. Antoine Pulido states; patient can stop taking the Bactrim when this bottle runs out; will continue the Eliquis d/t blood clot potential from Revlimid; patient verbalizing understanding; patient will rtc 12/30 for labs/ov with [facility]/immunizations

## 2021-07-01 LAB
B2 MICROGLOB SERPL-MCNC: 1.9 MG/L (ref 0.8–2.34)
CD3 CELLS # BLD: 513 /UL (ref 622–2402)
CD3 CELLS NFR BLD: 73.3 % (ref 57.5–86.2)
CD3+CD4+ CELLS # BLD: 315 /UL (ref 359–1519)
CD3+CD4+ CELLS NFR BLD: 45 % (ref 30.8–58.5)
CD3+CD4+ CELLS/CD3+CD8+ CLL BLD: 1.56 % (ref 0.92–3.72)
CD3+CD8+ CELLS # BLD: 202 /UL (ref 109–897)
CD3+CD8+ CELLS NFR BLD: 28.9 % (ref 12–35.5)
KAPPA LC FREE SER-MCNC: 4.46 MG/L (ref 3.3–19.4)
KAPPA LC FREE/LAMBDA FREE SER: 0.61 {RATIO} (ref 0.26–1.65)
LAMBDA LC FREE SERPL-MCNC: 7.35 MG/L (ref 5.71–26.3)
LYMPHOCYTES # BLD AUTO: 0.7 X10E3/UL (ref 0.7–3.1)
LYMPHOCYTES NFR BLD AUTO: 26 %
WBC # BLD AUTO: 2.9 X10E3/UL (ref 3.4–10.8)

## 2021-07-02 LAB
ALBUMIN SERPL ELPH-MCNC: 3.65 G/DL (ref 3.2–5.6)
ALBUMIN/GLOB SERPL: 1.6 {RATIO}
ALPHA1 GLOB SERPL ELPH-MCNC: 0.22 G/DL (ref 0.1–0.4)
ALPHA2 GLOB SERPL ELPH-MCNC: 0.72 G/DL (ref 0.4–1.2)
B-GLOBULIN SERPL QL ELPH: 0.87 G/DL (ref 0.6–1.3)
GAMMA GLOB MFR SERPL ELPH: 0.54 G/DL (ref 0.5–1.6)
IGA SERPL-MCNC: 31 MG/DL (ref 85–499)
IGG SERPL-MCNC: 549 MG/DL (ref 610–1616)
IGM SERPL-MCNC: 21 MG/DL (ref 35–242)
M PROTEIN SERPL ELPH-MCNC: ABNORMAL G/DL
PROT PATTERN SERPL ELPH-IMP: ABNORMAL
PROT PATTERN SPEC IFE-IMP: ABNORMAL
PROT SERPL-MCNC: 6 G/DL (ref 6.3–8.2)

## 2021-12-07 ENCOUNTER — PATIENT OUTREACH (OUTPATIENT)
Dept: CASE MANAGEMENT | Age: 61
End: 2021-12-07

## 2021-12-07 NOTE — PROGRESS NOTES
12/7 Called [facility] regarding vaccines. They are not giving post transplant vaccines. Pt will get vaccines here at 12/30 visit.

## 2021-12-30 ENCOUNTER — HOSPITAL ENCOUNTER (OUTPATIENT)
Dept: INFUSION THERAPY | Age: 61
Discharge: HOME | End: 2021-12-30
Payer: COMMERCIAL

## 2021-12-30 ENCOUNTER — PATIENT OUTREACH (OUTPATIENT)
Dept: CASE MANAGEMENT | Age: 61
End: 2021-12-30

## 2021-12-30 ENCOUNTER — HOSPITAL ENCOUNTER (OUTPATIENT)
Dept: LAB | Age: 61
Discharge: HOME | End: 2021-12-30
Payer: COMMERCIAL

## 2021-12-30 DIAGNOSIS — C90.00 MULTIPLE MYELOMA NOT HAVING ACHIEVED REMISSION (HCC): ICD-10-CM

## 2021-12-30 DIAGNOSIS — Z94.81 S/P AUTOLOGOUS BONE MARROW TRANSPLANTATION (HCC): ICD-10-CM

## 2021-12-30 DIAGNOSIS — Z94.81 S/P AUTOLOGOUS BONE MARROW TRANSPLANTATION (HCC): Primary | ICD-10-CM

## 2021-12-30 DIAGNOSIS — C90.01 MULTIPLE MYELOMA IN REMISSION (HCC): ICD-10-CM

## 2021-12-30 LAB
25(OH)D3 SERPL-MCNC: 30.1 NG/ML (ref 30–100)
ALBUMIN SERPL-MCNC: 3.5 G/DL (ref 3.2–4.6)
ALBUMIN/GLOB SERPL: 1 {RATIO} (ref 1.2–3.5)
ALP SERPL-CCNC: 88 U/L (ref 50–136)
ALT SERPL-CCNC: 45 U/L (ref 12–65)
ANION GAP SERPL CALC-SCNC: 4 MMOL/L (ref 7–16)
AST SERPL-CCNC: 18 U/L (ref 15–37)
BASOPHILS # BLD: 0 K/UL (ref 0–0.2)
BASOPHILS NFR BLD: 1 % (ref 0–2)
BILIRUB SERPL-MCNC: 0.3 MG/DL (ref 0.2–1.1)
BUN SERPL-MCNC: 15 MG/DL (ref 8–23)
CALCIUM SERPL-MCNC: 9.6 MG/DL (ref 8.3–10.4)
CHLORIDE SERPL-SCNC: 107 MMOL/L (ref 98–107)
CO2 SERPL-SCNC: 30 MMOL/L (ref 21–32)
CREAT SERPL-MCNC: 0.8 MG/DL (ref 0.6–1)
DIFFERENTIAL METHOD BLD: ABNORMAL
EOSINOPHIL # BLD: 0.1 K/UL (ref 0–0.8)
EOSINOPHIL NFR BLD: 3 % (ref 0.5–7.8)
ERYTHROCYTE [DISTWIDTH] IN BLOOD BY AUTOMATED COUNT: 15.3 % (ref 11.9–14.6)
GLOBULIN SER CALC-MCNC: 3.4 G/DL (ref 2.3–3.5)
GLUCOSE SERPL-MCNC: 94 MG/DL (ref 65–100)
HCT VFR BLD AUTO: 39.6 % (ref 35.8–46.3)
HGB BLD-MCNC: 12.5 G/DL (ref 11.7–15.4)
IMM GRANULOCYTES # BLD AUTO: 0 K/UL (ref 0–0.5)
IMM GRANULOCYTES NFR BLD AUTO: 0 % (ref 0–5)
LYMPHOCYTES # BLD: 1.4 K/UL (ref 0.5–4.6)
LYMPHOCYTES NFR BLD: 38 % (ref 13–44)
MAGNESIUM SERPL-MCNC: 1.9 MG/DL (ref 1.8–2.4)
MCH RBC QN AUTO: 29.6 PG (ref 26.1–32.9)
MCHC RBC AUTO-ENTMCNC: 31.6 G/DL (ref 31.4–35)
MCV RBC AUTO: 93.8 FL (ref 79.6–97.8)
MONOCYTES # BLD: 0.6 K/UL (ref 0.1–1.3)
MONOCYTES NFR BLD: 15 % (ref 4–12)
NEUTS SEG # BLD: 1.7 K/UL (ref 1.7–8.2)
NEUTS SEG NFR BLD: 44 % (ref 43–78)
NRBC # BLD: 0 K/UL (ref 0–0.2)
PLATELET # BLD AUTO: 240 K/UL (ref 150–450)
PMV BLD AUTO: 9.2 FL (ref 9.4–12.3)
POTASSIUM SERPL-SCNC: 4.2 MMOL/L (ref 3.5–5.1)
PROT SERPL-MCNC: 6.9 G/DL (ref 6.3–8.2)
RBC # BLD AUTO: 4.22 M/UL (ref 4.05–5.2)
SODIUM SERPL-SCNC: 141 MMOL/L (ref 136–145)
WBC # BLD AUTO: 3.8 K/UL (ref 4.3–11.1)

## 2021-12-30 PROCEDURE — 82306 VITAMIN D 25 HYDROXY: CPT

## 2021-12-30 PROCEDURE — 90471 IMMUNIZATION ADMIN: CPT

## 2021-12-30 PROCEDURE — 90700 DTAP VACCINE < 7 YRS IM: CPT | Performed by: INTERNAL MEDICINE

## 2021-12-30 PROCEDURE — 84165 PROTEIN E-PHORESIS SERUM: CPT

## 2021-12-30 PROCEDURE — 85025 COMPLETE CBC W/AUTO DIFF WBC: CPT

## 2021-12-30 PROCEDURE — 90648 HIB PRP-T VACCINE 4 DOSE IM: CPT | Performed by: INTERNAL MEDICINE

## 2021-12-30 PROCEDURE — 90670 PCV13 VACCINE IM: CPT | Performed by: INTERNAL MEDICINE

## 2021-12-30 PROCEDURE — 90472 IMMUNIZATION ADMIN EACH ADD: CPT

## 2021-12-30 PROCEDURE — 83735 ASSAY OF MAGNESIUM: CPT

## 2021-12-30 PROCEDURE — 90713 POLIOVIRUS IPV SC/IM: CPT | Performed by: INTERNAL MEDICINE

## 2021-12-30 PROCEDURE — 80053 COMPREHEN METABOLIC PANEL: CPT

## 2021-12-30 PROCEDURE — 74011250636 HC RX REV CODE- 250/636: Performed by: INTERNAL MEDICINE

## 2021-12-30 PROCEDURE — 36415 COLL VENOUS BLD VENIPUNCTURE: CPT

## 2021-12-30 PROCEDURE — 83883 ASSAY NEPHELOMETRY NOT SPEC: CPT

## 2021-12-30 PROCEDURE — 86334 IMMUNOFIX E-PHORESIS SERUM: CPT

## 2021-12-30 PROCEDURE — 90746 HEPB VACCINE 3 DOSE ADULT IM: CPT | Performed by: INTERNAL MEDICINE

## 2021-12-30 RX ADMIN — HEPATITIS B VACCINE (RECOMBINANT) 20 MCG: 20 INJECTION, SUSPENSION INTRAMUSCULAR at 15:33

## 2021-12-30 RX ADMIN — PNEUMOCOCCAL 13-VALENT CONJUGATE VACCINE 0.5 ML: 2.2; 2.2; 2.2; 2.2; 2.2; 4.4; 2.2; 2.2; 2.2; 2.2; 2.2; 2.2; 2.2 INJECTION, SUSPENSION INTRAMUSCULAR at 15:34

## 2021-12-30 RX ADMIN — Medication 0.5 ML: at 15:35

## 2021-12-30 RX ADMIN — DIPHTHERIA AND TETANUS TOXOIDS AND ACELLULAR PERTUSSIS VACCINE ADSORBED 0.5 ML: 10; 25; 25; 25; 8 SUSPENSION INTRAMUSCULAR at 15:37

## 2021-12-30 RX ADMIN — POLIOVIRUS TYPE 1 ANTIGEN (FORMALDEHYDE INACTIVATED), POLIOVIRUS TYPE 2 ANTIGEN (FORMALDEHYDE INACTIVATED), AND POLIOVIRUS TYPE 3 ANTIGEN (FORMALDEHYDE INACTIVATED) 0.5 ML: 40; 8; 32 INJECTION, SUSPENSION INTRAMUSCULAR at 15:38

## 2021-12-30 NOTE — PROGRESS NOTES
12/30 Pt here for follow-up s/p one year ASCT for the treatment of MM. Pt being followed by Dr. Laura Vora at [facility]. Dr. Laura Vora managing Revlimid dose. Pt tolerating Revlimid 5mg. Pt will get vaccines today in infusion. RTC 2/22 for OV and vaccines.

## 2021-12-30 NOTE — PROGRESS NOTES
Arrived to the [facility]. Vaccines completed. Provided education on vaccines    Patient instructed to report any side affects to ordering provider. Patient tolerated vaccines. Any issues or concerns during appointment: none. Patient aware has no future appointment at this time.   Discharged ambulatory

## 2022-01-03 LAB
KAPPA LC FREE SER-MCNC: 8.92 MG/L (ref 3.3–19.4)
KAPPA LC FREE/LAMBDA FREE SER: 0.75 {RATIO} (ref 0.26–1.65)
LAMBDA LC FREE SERPL-MCNC: 11.87 MG/L (ref 5.71–26.3)

## 2022-01-04 LAB
ALBUMIN SERPL ELPH-MCNC: 3.51 G/DL (ref 3.2–5.6)
ALBUMIN/GLOB SERPL: 1 {RATIO}
ALPHA1 GLOB SERPL ELPH-MCNC: 0.32 G/DL (ref 0.1–0.4)
ALPHA2 GLOB SERPL ELPH-MCNC: 0.86 G/DL (ref 0.4–1.2)
B-GLOBULIN SERPL QL ELPH: 1.15 G/DL (ref 0.6–1.3)
GAMMA GLOB MFR SERPL ELPH: 1.05 G/DL (ref 0.5–1.6)
IGA SERPL-MCNC: 52 MG/DL (ref 85–499)
IGG SERPL-MCNC: 785 MG/DL (ref 610–1616)
IGM SERPL-MCNC: 20 MG/DL (ref 35–242)
M PROTEIN SERPL ELPH-MCNC: ABNORMAL G/DL
PROT PATTERN SERPL ELPH-IMP: ABNORMAL
PROT PATTERN SPEC IFE-IMP: ABNORMAL
PROT SERPL-MCNC: 6.9 G/DL (ref 6.3–8.2)

## 2022-02-22 ENCOUNTER — PATIENT OUTREACH (OUTPATIENT)
Dept: CASE MANAGEMENT | Age: 62
End: 2022-02-22

## 2022-02-22 ENCOUNTER — HOSPITAL ENCOUNTER (OUTPATIENT)
Dept: LAB | Age: 62
Discharge: HOME | End: 2022-02-22
Payer: COMMERCIAL

## 2022-02-22 ENCOUNTER — HOSPITAL ENCOUNTER (OUTPATIENT)
Dept: INFUSION THERAPY | Age: 62
Discharge: HOME | End: 2022-02-22
Payer: COMMERCIAL

## 2022-02-22 DIAGNOSIS — C90.00 MULTIPLE MYELOMA NOT HAVING ACHIEVED REMISSION (HCC): ICD-10-CM

## 2022-02-22 DIAGNOSIS — C90.01 MULTIPLE MYELOMA IN REMISSION (HCC): ICD-10-CM

## 2022-02-22 DIAGNOSIS — Z94.81 S/P AUTOLOGOUS BONE MARROW TRANSPLANTATION (HCC): Primary | ICD-10-CM

## 2022-02-22 DIAGNOSIS — E55.9 VITAMIN D DEFICIENCY: ICD-10-CM

## 2022-02-22 LAB
25(OH)D3 SERPL-MCNC: 31.2 NG/ML (ref 30–100)
ALBUMIN SERPL-MCNC: 3.2 G/DL (ref 3.2–4.6)
ALBUMIN/GLOB SERPL: 0.9 {RATIO} (ref 1.2–3.5)
ALP SERPL-CCNC: 134 U/L (ref 50–136)
ALT SERPL-CCNC: 52 U/L (ref 12–65)
ANION GAP SERPL CALC-SCNC: 3 MMOL/L (ref 7–16)
AST SERPL-CCNC: 16 U/L (ref 15–37)
BASOPHILS # BLD: 0 K/UL (ref 0–0.2)
BASOPHILS NFR BLD: 0 % (ref 0–2)
BILIRUB SERPL-MCNC: 0.2 MG/DL (ref 0.2–1.1)
BUN SERPL-MCNC: 10 MG/DL (ref 8–23)
CALCIUM SERPL-MCNC: 8.9 MG/DL (ref 8.3–10.4)
CHLORIDE SERPL-SCNC: 103 MMOL/L (ref 98–107)
CO2 SERPL-SCNC: 31 MMOL/L (ref 21–32)
CREAT SERPL-MCNC: 0.8 MG/DL (ref 0.6–1)
DIFFERENTIAL METHOD BLD: ABNORMAL
EOSINOPHIL # BLD: 0.2 K/UL (ref 0–0.8)
EOSINOPHIL NFR BLD: 3 % (ref 0.5–7.8)
ERYTHROCYTE [DISTWIDTH] IN BLOOD BY AUTOMATED COUNT: 14.4 % (ref 11.9–14.6)
GLOBULIN SER CALC-MCNC: 3.7 G/DL (ref 2.3–3.5)
GLUCOSE SERPL-MCNC: 100 MG/DL (ref 65–100)
HCT VFR BLD AUTO: 34 % (ref 35.8–46.3)
HGB BLD-MCNC: 10.9 G/DL (ref 11.7–15.4)
IMM GRANULOCYTES # BLD AUTO: 0 K/UL (ref 0–0.5)
IMM GRANULOCYTES NFR BLD AUTO: 1 % (ref 0–5)
LYMPHOCYTES # BLD: 1.6 K/UL (ref 0.5–4.6)
LYMPHOCYTES NFR BLD: 29 % (ref 13–44)
MAGNESIUM SERPL-MCNC: 2.2 MG/DL (ref 1.8–2.4)
MCH RBC QN AUTO: 30.4 PG (ref 26.1–32.9)
MCHC RBC AUTO-ENTMCNC: 32.1 G/DL (ref 31.4–35)
MCV RBC AUTO: 94.7 FL (ref 79.6–97.8)
MONOCYTES # BLD: 0.7 K/UL (ref 0.1–1.3)
MONOCYTES NFR BLD: 13 % (ref 4–12)
NEUTS SEG # BLD: 3 K/UL (ref 1.7–8.2)
NEUTS SEG NFR BLD: 54 % (ref 43–78)
NRBC # BLD: 0 K/UL (ref 0–0.2)
PLATELET # BLD AUTO: 222 K/UL (ref 150–450)
PMV BLD AUTO: 8.9 FL (ref 9.4–12.3)
POTASSIUM SERPL-SCNC: 4.2 MMOL/L (ref 3.5–5.1)
PROT SERPL-MCNC: 6.9 G/DL (ref 6.3–8.2)
RBC # BLD AUTO: 3.59 M/UL (ref 4.05–5.2)
SODIUM SERPL-SCNC: 137 MMOL/L (ref 136–145)
WBC # BLD AUTO: 5.5 K/UL (ref 4.3–11.1)

## 2022-02-22 PROCEDURE — 90734 MENACWYD/MENACWYCRM VACC IM: CPT | Performed by: INTERNAL MEDICINE

## 2022-02-22 PROCEDURE — 90472 IMMUNIZATION ADMIN EACH ADD: CPT

## 2022-02-22 PROCEDURE — 90648 HIB PRP-T VACCINE 4 DOSE IM: CPT | Performed by: INTERNAL MEDICINE

## 2022-02-22 PROCEDURE — 83735 ASSAY OF MAGNESIUM: CPT

## 2022-02-22 PROCEDURE — 82306 VITAMIN D 25 HYDROXY: CPT

## 2022-02-22 PROCEDURE — 74011250636 HC RX REV CODE- 250/636: Performed by: INTERNAL MEDICINE

## 2022-02-22 PROCEDURE — 90471 IMMUNIZATION ADMIN: CPT

## 2022-02-22 PROCEDURE — 85025 COMPLETE CBC W/AUTO DIFF WBC: CPT

## 2022-02-22 PROCEDURE — 90670 PCV13 VACCINE IM: CPT | Performed by: INTERNAL MEDICINE

## 2022-02-22 PROCEDURE — 90700 DTAP VACCINE < 7 YRS IM: CPT | Performed by: INTERNAL MEDICINE

## 2022-02-22 PROCEDURE — 36415 COLL VENOUS BLD VENIPUNCTURE: CPT

## 2022-02-22 PROCEDURE — 90713 POLIOVIRUS IPV SC/IM: CPT | Performed by: INTERNAL MEDICINE

## 2022-02-22 PROCEDURE — 82784 ASSAY IGA/IGD/IGG/IGM EACH: CPT

## 2022-02-22 PROCEDURE — 90746 HEPB VACCINE 3 DOSE ADULT IM: CPT | Performed by: INTERNAL MEDICINE

## 2022-02-22 PROCEDURE — 80053 COMPREHEN METABOLIC PANEL: CPT

## 2022-02-22 PROCEDURE — 74011000636 HC RX REV CODE- 636: Performed by: INTERNAL MEDICINE

## 2022-02-22 PROCEDURE — 83521 IG LIGHT CHAINS FREE EACH: CPT

## 2022-02-22 RX ORDER — ALBUTEROL SULFATE 0.83 MG/ML
2.5 SOLUTION RESPIRATORY (INHALATION) AS NEEDED
Status: CANCELLED
Start: 2022-02-22

## 2022-02-22 RX ORDER — ACETAMINOPHEN 325 MG/1
650 TABLET ORAL AS NEEDED
Status: CANCELLED
Start: 2022-02-22

## 2022-02-22 RX ORDER — DIPHENHYDRAMINE HYDROCHLORIDE 50 MG/ML
50 INJECTION, SOLUTION INTRAMUSCULAR; INTRAVENOUS AS NEEDED
Status: CANCELLED
Start: 2022-02-22

## 2022-02-22 RX ORDER — HYDROCORTISONE SODIUM SUCCINATE 100 MG/2ML
100 INJECTION, POWDER, FOR SOLUTION INTRAMUSCULAR; INTRAVENOUS AS NEEDED
Status: CANCELLED | OUTPATIENT
Start: 2022-02-22

## 2022-02-22 RX ORDER — TIXAGEVIMAB 150 MG/1.5
150 VIAL (ML) INTRAMUSCULAR ONCE
Status: DISCONTINUED | OUTPATIENT
Start: 2022-02-22 | End: 2022-02-22

## 2022-02-22 RX ORDER — CILGAVIMAB 150 MG/1.5
150 VIAL (ML) INTRAMUSCULAR ONCE
Status: CANCELLED | OUTPATIENT
Start: 2022-02-22 | End: 2022-02-22

## 2022-02-22 RX ORDER — DIPHENHYDRAMINE HYDROCHLORIDE 50 MG/ML
25 INJECTION, SOLUTION INTRAMUSCULAR; INTRAVENOUS AS NEEDED
Status: CANCELLED
Start: 2022-02-22

## 2022-02-22 RX ORDER — TIXAGEVIMAB 150 MG/1.5
150 VIAL (ML) INTRAMUSCULAR ONCE
Status: CANCELLED | OUTPATIENT
Start: 2022-02-22 | End: 2022-02-22

## 2022-02-22 RX ORDER — CILGAVIMAB 150 MG/1.5
150 VIAL (ML) INTRAMUSCULAR ONCE
Status: DISCONTINUED | OUTPATIENT
Start: 2022-02-22 | End: 2022-02-22

## 2022-02-22 RX ORDER — EPINEPHRINE 1 MG/ML
0.3 INJECTION, SOLUTION, CONCENTRATE INTRAVENOUS AS NEEDED
Status: CANCELLED | OUTPATIENT
Start: 2022-02-22

## 2022-02-22 RX ORDER — ONDANSETRON 2 MG/ML
8 INJECTION INTRAMUSCULAR; INTRAVENOUS AS NEEDED
Status: CANCELLED | OUTPATIENT
Start: 2022-02-22

## 2022-02-22 RX ADMIN — PNEUMOCOCCAL 13-VALENT CONJUGATE VACCINE 0.5 ML: 2.2; 2.2; 2.2; 2.2; 2.2; 4.4; 2.2; 2.2; 2.2; 2.2; 2.2; 2.2; 2.2 INJECTION, SUSPENSION INTRAMUSCULAR at 17:40

## 2022-02-22 RX ADMIN — POLIOVIRUS TYPE 1 ANTIGEN (FORMALDEHYDE INACTIVATED), POLIOVIRUS TYPE 2 ANTIGEN (FORMALDEHYDE INACTIVATED), AND POLIOVIRUS TYPE 3 ANTIGEN (FORMALDEHYDE INACTIVATED) 0.5 ML: 40; 8; 32 INJECTION, SUSPENSION INTRAMUSCULAR at 17:49

## 2022-02-22 RX ADMIN — Medication 0.5 ML: at 17:39

## 2022-02-22 RX ADMIN — HEPATITIS B VACCINE (RECOMBINANT) 20 MCG: 20 INJECTION, SUSPENSION INTRAMUSCULAR at 17:44

## 2022-02-22 RX ADMIN — Medication 0.5 ML: at 17:53

## 2022-02-22 RX ADMIN — DIPHTHERIA AND TETANUS TOXOIDS AND ACELLULAR PERTUSSIS VACCINE ADSORBED 0.5 ML: 10; 25; 25; 25; 8 SUSPENSION INTRAMUSCULAR at 17:47

## 2022-02-22 NOTE — PROGRESS NOTES
Arrived to the [facility]. Immunizations x 6 completed. Patient tolerated well. Any issues or concerns during appointment: Patient refused Evusheld injections. Patient aware of next infusion appointment on 12/29 (date) at 1600 (time). Patient aware of next lab and [facility] office visit on 12/29 (date) at 1400 (time). Patient instructed to call provider with temperature of 100.4 or greater or nausea/vomiting/ diarrhea or pain not controlled by medications  Discharged ambulatory in stable condition.

## 2022-02-22 NOTE — PROGRESS NOTES
2/22 Pt here for OV prior to receiving 14 months vaccines and Evusheld in infusion today. Pt aware Evusheld will be 2 additional injections and will add an additional hour wait. Pt voices desire to receive Evusheld today r/t lengthily ride instead of scheduling at a later time. Pt voices doing well. Pt being followed by Dr. Laxmi Kraus with [facility]. RTC in dec 2022. [facility] will sign off at this time. Please contact us if we can be of further assistance. 2/23/22- Received word pt refused Evusheld. MD was made aware.

## 2022-02-23 LAB
KAPPA LC FREE SER-MCNC: 9.8 MG/L (ref 3.3–19.4)
KAPPA LC FREE/LAMBDA FREE SER: 0.77 {RATIO} (ref 0.26–1.65)
LAMBDA LC FREE SERPL-MCNC: 12.7 MG/L (ref 5.7–26.3)

## 2022-02-24 LAB
ALBUMIN SERPL ELPH-MCNC: 3.2 G/DL (ref 2.9–4.4)
ALBUMIN/GLOB SERPL: 1.2 {RATIO} (ref 0.7–1.7)
ALPHA1 GLOB SERPL ELPH-MCNC: 0.3 G/DL (ref 0–0.4)
ALPHA2 GLOB SERPL ELPH-MCNC: 0.9 G/DL (ref 0.4–1)
B-GLOBULIN SERPL ELPH-MCNC: 1 G/DL (ref 0.7–1.3)
GAMMA GLOB SERPL ELPH-MCNC: 0.6 G/DL (ref 0.4–1.8)
GLOBULIN SER-MCNC: 2.8 G/DL (ref 2.2–3.9)
IGA SERPL-MCNC: 65 MG/DL (ref 87–352)
IGG SERPL-MCNC: 705 MG/DL (ref 586–1602)
IGM SERPL-MCNC: 22 MG/DL (ref 26–217)
INTERPRETATION SERPL IEP-IMP: ABNORMAL
M PROTEIN SERPL ELPH-MCNC: ABNORMAL G/DL
PROT SERPL-MCNC: 6 G/DL (ref 6–8.5)

## 2022-03-18 PROBLEM — D70.9 FEBRILE NEUTROPENIA (HCC): Status: ACTIVE | Noted: 2021-01-18

## 2022-03-18 PROBLEM — M25.552 LEFT HIP PAIN: Status: ACTIVE | Noted: 2018-03-29

## 2022-03-18 PROBLEM — R50.81 FEBRILE NEUTROPENIA (HCC): Status: ACTIVE | Noted: 2021-01-18

## 2022-03-18 PROBLEM — I10 ESSENTIAL HYPERTENSION: Status: ACTIVE | Noted: 2018-03-29

## 2022-03-19 PROBLEM — D70.9 NEUTROPENIC FEVER (HCC): Status: ACTIVE | Noted: 2021-01-08

## 2022-03-19 PROBLEM — R50.81 NEUTROPENIC FEVER (HCC): Status: ACTIVE | Noted: 2021-01-08

## 2022-03-19 PROBLEM — Z94.84 HISTORY OF AUTOLOGOUS STEM CELL TRANSPLANT (HCC): Status: ACTIVE | Noted: 2021-01-18

## 2022-03-19 PROBLEM — C90.00 MULTIPLE MYELOMA NOT HAVING ACHIEVED REMISSION (HCC): Status: ACTIVE | Noted: 2020-11-24

## 2022-03-19 PROBLEM — Z94.81 S/P AUTOLOGOUS BONE MARROW TRANSPLANTATION (HCC): Status: ACTIVE | Noted: 2021-06-30

## 2022-03-20 PROBLEM — K21.9 GERD (GASTROESOPHAGEAL REFLUX DISEASE): Status: ACTIVE | Noted: 2018-03-29

## 2022-03-20 PROBLEM — E78.5 HYPERLIPIDEMIA: Status: ACTIVE | Noted: 2018-03-29

## 2022-04-06 ENCOUNTER — PATIENT OUTREACH (OUTPATIENT)
Dept: CASE MANAGEMENT | Age: 62
End: 2022-04-06

## 2022-04-06 NOTE — PROGRESS NOTES
4/4 E-mail sent to coordinator Venice Chase at [facility] regarding D 100 studies. 4/6 No response received. Call placed to Venice Chase. Received call from Zee Hermosillo To look into getting test ordered.

## 2022-04-24 DIAGNOSIS — C90.00 MULTIPLE MYELOMA NOT HAVING ACHIEVED REMISSION (HCC): Primary | ICD-10-CM

## 2022-04-26 ENCOUNTER — PATIENT OUTREACH (OUTPATIENT)
Dept: CASE MANAGEMENT | Age: 62
End: 2022-04-26

## 2022-04-26 NOTE — PROGRESS NOTES
4/26 Called and spoke with pt regarding getting 1 year testing set up. Pt prefers studies to be set up with [facility]. Sessions, RN made aware. To order 1 year studies.

## 2022-12-22 DIAGNOSIS — E55.9 VITAMIN D DEFICIENCY, UNSPECIFIED: ICD-10-CM

## 2022-12-22 DIAGNOSIS — C90.01 MULTIPLE MYELOMA IN REMISSION (HCC): Primary | ICD-10-CM

## 2022-12-29 ENCOUNTER — OFFICE VISIT (OUTPATIENT)
Dept: ONCOLOGY | Age: 62
End: 2022-12-29
Payer: COMMERCIAL

## 2022-12-29 ENCOUNTER — HOSPITAL ENCOUNTER (OUTPATIENT)
Dept: INFUSION THERAPY | Age: 62
Discharge: HOME | End: 2022-12-29
Payer: COMMERCIAL

## 2022-12-29 ENCOUNTER — HOSPITAL ENCOUNTER (OUTPATIENT)
Dept: LAB | Age: 62
Discharge: HOME | End: 2022-12-29
Payer: COMMERCIAL

## 2022-12-29 VITALS
HEART RATE: 94 BPM | TEMPERATURE: 98.6 F | RESPIRATION RATE: 16 BRPM | BODY MASS INDEX: 25.97 KG/M2 | DIASTOLIC BLOOD PRESSURE: 71 MMHG | OXYGEN SATURATION: 100 % | SYSTOLIC BLOOD PRESSURE: 124 MMHG | WEIGHT: 133 LBS

## 2022-12-29 DIAGNOSIS — C90.01 MULTIPLE MYELOMA IN REMISSION (HCC): Primary | ICD-10-CM

## 2022-12-29 DIAGNOSIS — C90.01 MULTIPLE MYELOMA IN REMISSION (HCC): ICD-10-CM

## 2022-12-29 DIAGNOSIS — Z94.81 S/P AUTOLOGOUS BONE MARROW TRANSPLANTATION (HCC): ICD-10-CM

## 2022-12-29 DIAGNOSIS — I82.4Y1 DEEP VEIN THROMBOSIS (DVT) OF PROXIMAL VEIN OF RIGHT LOWER EXTREMITY, UNSPECIFIED CHRONICITY (HCC): ICD-10-CM

## 2022-12-29 DIAGNOSIS — E55.9 VITAMIN D DEFICIENCY, UNSPECIFIED: ICD-10-CM

## 2022-12-29 DIAGNOSIS — E55.9 VITAMIN D DEFICIENCY: ICD-10-CM

## 2022-12-29 LAB
25(OH)D3 SERPL-MCNC: 17.9 NG/ML (ref 30–100)
ALBUMIN SERPL-MCNC: 3.5 G/DL (ref 3.2–4.6)
ALBUMIN/GLOB SERPL: 0.9 {RATIO} (ref 0.4–1.6)
ALP SERPL-CCNC: 130 U/L (ref 50–136)
ALT SERPL-CCNC: 34 U/L (ref 12–65)
ANION GAP SERPL CALC-SCNC: 6 MMOL/L (ref 2–11)
AST SERPL-CCNC: 21 U/L (ref 15–37)
BASOPHILS # BLD: 0 K/UL (ref 0–0.2)
BASOPHILS NFR BLD: 1 % (ref 0–2)
BILIRUB SERPL-MCNC: 0.2 MG/DL (ref 0.2–1.1)
BUN SERPL-MCNC: 17 MG/DL (ref 8–23)
CALCIUM SERPL-MCNC: 9.7 MG/DL (ref 8.3–10.4)
CHLORIDE SERPL-SCNC: 106 MMOL/L (ref 101–110)
CO2 SERPL-SCNC: 28 MMOL/L (ref 21–32)
CREAT SERPL-MCNC: 0.9 MG/DL (ref 0.6–1)
DIFFERENTIAL METHOD BLD: ABNORMAL
EOSINOPHIL # BLD: 0.1 K/UL (ref 0–0.8)
EOSINOPHIL NFR BLD: 3 % (ref 0.5–7.8)
ERYTHROCYTE [DISTWIDTH] IN BLOOD BY AUTOMATED COUNT: 14.8 % (ref 11.9–14.6)
GLOBULIN SER CALC-MCNC: 4 G/DL (ref 2.8–4.5)
GLUCOSE SERPL-MCNC: 108 MG/DL (ref 65–100)
HCT VFR BLD AUTO: 38.8 % (ref 35.8–46.3)
HGB BLD-MCNC: 12.2 G/DL (ref 11.7–15.4)
IMM GRANULOCYTES # BLD AUTO: 0 K/UL (ref 0–0.5)
IMM GRANULOCYTES NFR BLD AUTO: 1 % (ref 0–5)
LYMPHOCYTES # BLD: 1.6 K/UL (ref 0.5–4.6)
LYMPHOCYTES NFR BLD: 39 % (ref 13–44)
MCH RBC QN AUTO: 29.5 PG (ref 26.1–32.9)
MCHC RBC AUTO-ENTMCNC: 31.4 G/DL (ref 31.4–35)
MCV RBC AUTO: 93.9 FL (ref 82–102)
MONOCYTES # BLD: 0.5 K/UL (ref 0.1–1.3)
MONOCYTES NFR BLD: 13 % (ref 4–12)
NEUTS SEG # BLD: 1.8 K/UL (ref 1.7–8.2)
NEUTS SEG NFR BLD: 43 % (ref 43–78)
NRBC # BLD: 0 K/UL (ref 0–0.2)
PLATELET # BLD AUTO: 245 K/UL (ref 150–450)
PLATELET COMMENT: ADEQUATE
PMV BLD AUTO: 9.4 FL (ref 9.4–12.3)
POTASSIUM SERPL-SCNC: 4.4 MMOL/L (ref 3.5–5.1)
PROT SERPL-MCNC: 7.5 G/DL (ref 6.3–8.2)
RBC # BLD AUTO: 4.13 M/UL (ref 4.05–5.2)
RBC MORPH BLD: ABNORMAL
SODIUM SERPL-SCNC: 140 MMOL/L (ref 133–143)
WBC # BLD AUTO: 4 K/UL (ref 4.3–11.1)
WBC MORPH BLD: ABNORMAL

## 2022-12-29 PROCEDURE — 36415 COLL VENOUS BLD VENIPUNCTURE: CPT

## 2022-12-29 PROCEDURE — 82306 VITAMIN D 25 HYDROXY: CPT

## 2022-12-29 PROCEDURE — 85025 COMPLETE CBC W/AUTO DIFF WBC: CPT

## 2022-12-29 PROCEDURE — 99215 OFFICE O/P EST HI 40 MIN: CPT | Performed by: INTERNAL MEDICINE

## 2022-12-29 PROCEDURE — G0009 ADMIN PNEUMOCOCCAL VACCINE: HCPCS | Performed by: INTERNAL MEDICINE

## 2022-12-29 PROCEDURE — 6360000002 HC RX W HCPCS: Performed by: INTERNAL MEDICINE

## 2022-12-29 PROCEDURE — 90670 PCV13 VACCINE IM: CPT | Performed by: INTERNAL MEDICINE

## 2022-12-29 PROCEDURE — 83521 IG LIGHT CHAINS FREE EACH: CPT

## 2022-12-29 PROCEDURE — 90472 IMMUNIZATION ADMIN EACH ADD: CPT | Performed by: INTERNAL MEDICINE

## 2022-12-29 PROCEDURE — 3078F DIAST BP <80 MM HG: CPT | Performed by: INTERNAL MEDICINE

## 2022-12-29 PROCEDURE — 90648 HIB PRP-T VACCINE 4 DOSE IM: CPT | Performed by: INTERNAL MEDICINE

## 2022-12-29 PROCEDURE — 90471 IMMUNIZATION ADMIN: CPT | Performed by: INTERNAL MEDICINE

## 2022-12-29 PROCEDURE — 3074F SYST BP LT 130 MM HG: CPT | Performed by: INTERNAL MEDICINE

## 2022-12-29 PROCEDURE — G0010 ADMIN HEPATITIS B VACCINE: HCPCS | Performed by: INTERNAL MEDICINE

## 2022-12-29 PROCEDURE — 90707 MMR VACCINE SC: CPT | Performed by: INTERNAL MEDICINE

## 2022-12-29 PROCEDURE — 82784 ASSAY IGA/IGD/IGG/IGM EACH: CPT

## 2022-12-29 PROCEDURE — 90713 POLIOVIRUS IPV SC/IM: CPT | Performed by: INTERNAL MEDICINE

## 2022-12-29 PROCEDURE — 90472 IMMUNIZATION ADMIN EACH ADD: CPT

## 2022-12-29 PROCEDURE — 90740 HEPB VACC 3 DOSE IMMUNSUP IM: CPT | Performed by: INTERNAL MEDICINE

## 2022-12-29 PROCEDURE — 90700 DTAP VACCINE < 7 YRS IM: CPT | Performed by: INTERNAL MEDICINE

## 2022-12-29 RX ORDER — CYCLOBENZAPRINE HCL 10 MG
TABLET ORAL
COMMUNITY
Start: 2022-12-29

## 2022-12-29 RX ADMIN — POLIOVIRUS TYPE 1 ANTIGEN (FORMALDEHYDE INACTIVATED), POLIOVIRUS TYPE 2 ANTIGEN (FORMALDEHYDE INACTIVATED), AND POLIOVIRUS TYPE 3 ANTIGEN (FORMALDEHYDE INACTIVATED) 0.5 ML: 40; 8; 32 INJECTION, SUSPENSION INTRAMUSCULAR at 16:17

## 2022-12-29 RX ADMIN — DIPHTHERIA AND TETANUS TOXOIDS AND ACELLULAR PERTUSSIS VACCINE ADSORBED 0.5 ML: 10; 25; 25; 25; 8 SUSPENSION INTRAMUSCULAR at 16:14

## 2022-12-29 RX ADMIN — HEPATITIS B VACCINE (RECOMBINANT) 1 ML: 20 INJECTION, SUSPENSION INTRAMUSCULAR at 16:16

## 2022-12-29 RX ADMIN — MEASLES, MUMPS, AND RUBELLA VIRUS VACCINE LIVE 0.5 ML: 1000; 12500; 1000 INJECTION, POWDER, LYOPHILIZED, FOR SUSPENSION SUBCUTANEOUS at 16:15

## 2022-12-29 RX ADMIN — HAEMOPHILUS B POLYSACCHARIDE CONJUGATE VACCINE FOR INJ 0.5 ML: RECON SOLN at 16:17

## 2022-12-29 RX ADMIN — PNEUMOCOCCAL 13-VALENT CONJUGATE VACCINE 0.5 ML: 2.2; 2.2; 2.2; 2.2; 2.2; 4.4; 2.2; 2.2; 2.2; 2.2; 2.2; 2.2; 2.2 INJECTION, SUSPENSION INTRAMUSCULAR at 16:14

## 2022-12-29 ASSESSMENT — PATIENT HEALTH QUESTIONNAIRE - PHQ9
2. FEELING DOWN, DEPRESSED OR HOPELESS: 0
SUM OF ALL RESPONSES TO PHQ QUESTIONS 1-9: 0
SUM OF ALL RESPONSES TO PHQ9 QUESTIONS 1 & 2: 0
SUM OF ALL RESPONSES TO PHQ QUESTIONS 1-9: 0
1. LITTLE INTEREST OR PLEASURE IN DOING THINGS: 0

## 2022-12-29 NOTE — PROGRESS NOTES
Arrived to the [facility]. BMT vaccinations completed. Provided education on vaccinations. Patient instructed to report any side affects to ordering provider. Patient tolerated well. Any issues or concerns during appointment: no.  Patient aware of next lab/OV appointment on 9/14/2023 (date) at 2:40 pm (time). Discharged ambulatory with self.

## 2022-12-29 NOTE — PROGRESS NOTES
erythema. Examination of the skin did not reveal any lesions. Medical problems and test results were reviewed with the patient today. KPS:     90. LABORATORY INVESTIGATIONS:  CBC showed a WBC count of 4.1, ANC was 1.8, Hemoglobin was 12.2 and Platelets were 946. ASSESSMENT:    Multiple Myeloma, hypo-secretory Lambda light chain; Vitamin D deficiency; s/p Autologous PBHCT; right leg DVT. I spent a total of 42 minutes on the day of the visit, managing the care of this patient. PLAN:     She should continue taking supplemental Vitamin D, prophylactic Acyclovir and Eliquis. Today she will complete her post-BMT Immunization; at her next clinic visit I will re-check her CBC, CMP, Kappa/Lambda level, Vitamin D level and SPEP with Immunofixation. She should continue taking maintenance Revlimid.         Lynne Montgomery MD  Hematology/BMT

## 2022-12-29 NOTE — PATIENT INSTRUCTIONS
Patient Instructions from Today's Visit    Reason for Visit:  Follow up Multiple Myeloma     Plan:  Continue Revlimid  Lab results reviewed    Follow Up: Follow up September with labs prior    Recent Lab Results:  Hospital Outpatient Visit on 12/29/2022   Component Date Value Ref Range Status    WBC 12/29/2022 4.0 (A)  4.3 - 11.1 K/uL Final    PERIPHERAL REVIEW TO FOLLOW    RBC 12/29/2022 4.13  4.05 - 5.2 M/uL Final    Hemoglobin 12/29/2022 12.2  11.7 - 15.4 g/dL Final    Hematocrit 12/29/2022 38.8  35.8 - 46.3 % Final    MCV 12/29/2022 93.9  82.0 - 102.0 FL Final    MCH 12/29/2022 29.5  26.1 - 32.9 PG Final    MCHC 12/29/2022 31.4  31.4 - 35.0 g/dL Final    RDW 12/29/2022 14.8 (A)  11.9 - 14.6 % Final    Platelets 14/78/2320 245  150 - 450 K/uL Final    MPV 12/29/2022 9.4  9.4 - 12.3 FL Final    nRBC 12/29/2022 0.00  0.0 - 0.2 K/uL Final    **Note: Absolute NRBC parameter is now reported with Hemogram**    Differential Type 12/29/2022 PENDING   Incomplete    Sodium 12/29/2022 140  133 - 143 mmol/L Final    Potassium 12/29/2022 4.4  3.5 - 5.1 mmol/L Final    Chloride 12/29/2022 106  101 - 110 mmol/L Final    CO2 12/29/2022 28  21 - 32 mmol/L Final    Anion Gap 12/29/2022 6  2 - 11 mmol/L Final    Glucose 12/29/2022 108 (A)  65 - 100 mg/dL Final    BUN 12/29/2022 17  8 - 23 MG/DL Final    Creatinine 12/29/2022 0.90  0.6 - 1.0 MG/DL Final    Est, Glom Filt Rate 12/29/2022 >60  >60 ml/min/1.73m2 Final    Comment:      Pediatric calculator link: [employer]. org/professionals/kdoqi/gfr_calculatorped       These results are not intended for use in patients <25years of age. eGFR results are calculated without a race factor using  the 2021 CKD-EPI equation. Careful clinical correlation is recommended, particularly when comparing to results calculated using previous equations.   The CKD-EPI equation is less accurate in patients with extremes of muscle mass, extra-renal metabolism of creatinine, excessive creatine ingestion, or following therapy that affects renal tubular secretion. Calcium 12/29/2022 9.7  8.3 - 10.4 MG/DL Final    Total Bilirubin 12/29/2022 0.2  0.2 - 1.1 MG/DL Final    ALT 12/29/2022 34  12 - 65 U/L Final    AST 12/29/2022 21  15 - 37 U/L Final    Alk Phosphatase 12/29/2022 130  50 - 136 U/L Final    Total Protein 12/29/2022 7.5  6.3 - 8.2 g/dL Final    Albumin 12/29/2022 3.5  3.2 - 4.6 g/dL Final    Globulin 12/29/2022 4.0  2.8 - 4.5 g/dL Final    Albumin/Globulin Ratio 12/29/2022 0.9  0.4 - 1.6   Final         Treatment Summary has been discussed and given to patient: n/a        -------------------------------------------------------------------------------------------------------------------  Please call our office at [phone] if you have any  of the following symptoms:   Fever of 100.5 or greater  Chills  Shortness of breath  Swelling or pain in one leg    After office hours an answering service is available and will contact a provider for emergencies or if you are experiencing any of the above symptoms. Patient does express an interest in My Chart. My Chart log in information explained on the after visit summary printout at the [facility] [address] desk.     Vivien Krabbe, RN

## 2022-12-30 LAB
KAPPA LC FREE SER-MCNC: 18 MG/L (ref 3.3–19.4)
KAPPA LC FREE/LAMBDA FREE SER: 1.28 {RATIO} (ref 0.26–1.65)
LAMBDA LC FREE SERPL-MCNC: 14.1 MG/L (ref 5.7–26.3)

## 2023-01-03 LAB
ALBUMIN SERPL ELPH-MCNC: 3.5 G/DL (ref 2.9–4.4)
ALBUMIN/GLOB SERPL: 1.1 {RATIO} (ref 0.7–1.7)
ALPHA1 GLOB SERPL ELPH-MCNC: 0.4 G/DL (ref 0–0.4)
ALPHA2 GLOB SERPL ELPH-MCNC: 1 G/DL (ref 0.4–1)
B-GLOBULIN SERPL ELPH-MCNC: 1.1 G/DL (ref 0.7–1.3)
GAMMA GLOB SERPL ELPH-MCNC: 0.9 G/DL (ref 0.4–1.8)
GLOBULIN SER-MCNC: 3.3 G/DL (ref 2.2–3.9)
IGA SERPL-MCNC: 67 MG/DL (ref 87–352)
IGG SERPL-MCNC: 923 MG/DL (ref 586–1602)
IGM SERPL-MCNC: 27 MG/DL (ref 26–217)
INTERPRETATION SERPL IEP-IMP: ABNORMAL
M PROTEIN SERPL ELPH-MCNC: ABNORMAL G/DL
PROT SERPL-MCNC: 6.8 G/DL (ref 6–8.5)

## 2024-01-22 NOTE — PROGRESS NOTES
5/7/21: Patient seen in the office with Dr. Davey Vazquez for her D100 follow up post Autologous PBHCT on 12/30/2020, patient is actually D+128 today, she recently was dx with COVID on 4/13 and some of her D100 studies had to be rescheduled. Patient is doing well, no complaints at this time, she will continue taking supplemental Vitamin D, Acyclovir, Bactrim and Eliquis (7/2021). She will RTC on 6/30/2021 and in the meantime follow up with Dr. Raven Beverly in [place] to start a low-dose maintenance therapy.
yellow fruits and vegetables are especially good for you. Examples include spinach, carrots, peaches, and berries.     Foods high in fiber can reduce your cholesterol and provide important vitamins and minerals. High-fiber foods include whole-grain cereals and breads, oatmeal, beans, brown rice, citrus fruits, and apples.     Eat lean proteins. Heart-healthy proteins include seafood, lean meats and poultry, eggs, beans, peas, nuts, seeds, and soy products.     Limit drinks and foods with added sugar. These include candy, desserts, and soda pop.   Lifestyle changes    If your doctor recommends it, get more exercise. Walking is a good choice. Bit by bit, increase the amount you walk every day. Try for at least 30 minutes on most days of the week. You also may want to swim, bike, or do other activities.     Do not smoke. If you need help quitting, talk to your doctor about stop-smoking programs and medicines. These can increase your chances of quitting for good. Quitting smoking may be the most important step you can take to protect your heart. It is never too late to quit.     Limit alcohol to 2 drinks a day for men and 1 drink a day for women. Too much alcohol can cause health problems.     Manage other health problems such as diabetes, high blood pressure, and high cholesterol. If you think you may have a problem with alcohol or drug use, talk to your doctor.   Medicines    Take your medicines exactly as prescribed. Call your doctor if you think you are having a problem with your medicine.     If your doctor recommends aspirin, take the amount directed each day. Make sure you take aspirin and not another kind of pain reliever, such as acetaminophen (Tylenol).   When should you call for help?   Call 911 if you have symptoms of a heart attack. These may include:    Chest pain or pressure, or a strange feeling in the chest.     Sweating.     Shortness of breath.     Pain, pressure, or a strange feeling in the back,